# Patient Record
Sex: FEMALE | Race: OTHER | HISPANIC OR LATINO | URBAN - METROPOLITAN AREA
[De-identification: names, ages, dates, MRNs, and addresses within clinical notes are randomized per-mention and may not be internally consistent; named-entity substitution may affect disease eponyms.]

---

## 2020-08-14 ENCOUNTER — INPATIENT (INPATIENT)
Facility: HOSPITAL | Age: 25
LOS: 16 days | Discharge: NURSING, REHAB OR LONG-TERM CARE | DRG: 519 | End: 2020-08-31
Attending: NEUROLOGICAL SURGERY | Admitting: NEUROLOGICAL SURGERY
Payer: COMMERCIAL

## 2020-08-14 VITALS
HEIGHT: 65 IN | DIASTOLIC BLOOD PRESSURE: 61 MMHG | RESPIRATION RATE: 18 BRPM | SYSTOLIC BLOOD PRESSURE: 106 MMHG | TEMPERATURE: 99 F | HEART RATE: 79 BPM | WEIGHT: 190.04 LBS | OXYGEN SATURATION: 100 %

## 2020-08-14 PROCEDURE — 99222 1ST HOSP IP/OBS MODERATE 55: CPT

## 2020-08-14 PROCEDURE — 93010 ELECTROCARDIOGRAM REPORT: CPT

## 2020-08-14 PROCEDURE — 99285 EMERGENCY DEPT VISIT HI MDM: CPT

## 2020-08-14 RX ORDER — CYCLOBENZAPRINE HYDROCHLORIDE 10 MG/1
5 TABLET, FILM COATED ORAL THREE TIMES A DAY
Refills: 0 | Status: DISCONTINUED | OUTPATIENT
Start: 2020-08-14 | End: 2020-08-19

## 2020-08-14 RX ORDER — OXYCODONE AND ACETAMINOPHEN 5; 325 MG/1; MG/1
2 TABLET ORAL EVERY 6 HOURS
Refills: 0 | Status: DISCONTINUED | OUTPATIENT
Start: 2020-08-14 | End: 2020-08-19

## 2020-08-14 RX ORDER — HYDROMORPHONE HYDROCHLORIDE 2 MG/ML
0.5 INJECTION INTRAMUSCULAR; INTRAVENOUS; SUBCUTANEOUS EVERY 4 HOURS
Refills: 0 | Status: DISCONTINUED | OUTPATIENT
Start: 2020-08-14 | End: 2020-08-19

## 2020-08-14 RX ORDER — GABAPENTIN 400 MG/1
300 CAPSULE ORAL EVERY 8 HOURS
Refills: 0 | Status: DISCONTINUED | OUTPATIENT
Start: 2020-08-15 | End: 2020-08-19

## 2020-08-14 RX ORDER — OXYCODONE AND ACETAMINOPHEN 5; 325 MG/1; MG/1
1 TABLET ORAL ONCE
Refills: 0 | Status: DISCONTINUED | OUTPATIENT
Start: 2020-08-14 | End: 2020-08-14

## 2020-08-14 RX ORDER — OXYCODONE AND ACETAMINOPHEN 5; 325 MG/1; MG/1
1 TABLET ORAL EVERY 4 HOURS
Refills: 0 | Status: DISCONTINUED | OUTPATIENT
Start: 2020-08-14 | End: 2020-08-19

## 2020-08-14 RX ORDER — PANTOPRAZOLE SODIUM 20 MG/1
40 TABLET, DELAYED RELEASE ORAL
Refills: 0 | Status: DISCONTINUED | OUTPATIENT
Start: 2020-08-15 | End: 2020-08-19

## 2020-08-14 RX ORDER — GABAPENTIN 400 MG/1
200 CAPSULE ORAL ONCE
Refills: 0 | Status: COMPLETED | OUTPATIENT
Start: 2020-08-14 | End: 2020-08-14

## 2020-08-14 RX ORDER — SENNA PLUS 8.6 MG/1
2 TABLET ORAL AT BEDTIME
Refills: 0 | Status: DISCONTINUED | OUTPATIENT
Start: 2020-08-14 | End: 2020-08-19

## 2020-08-14 RX ORDER — DEXAMETHASONE 0.5 MG/5ML
4 ELIXIR ORAL EVERY 6 HOURS
Refills: 0 | Status: DISCONTINUED | OUTPATIENT
Start: 2020-08-14 | End: 2020-08-19

## 2020-08-14 RX ADMIN — OXYCODONE AND ACETAMINOPHEN 1 TABLET(S): 5; 325 TABLET ORAL at 23:48

## 2020-08-14 RX ADMIN — GABAPENTIN 200 MILLIGRAM(S): 400 CAPSULE ORAL at 23:48

## 2020-08-14 NOTE — ED ADULT NURSE NOTE - NSIMPLEMENTINTERV_GEN_ALL_ED
Implemented All Fall Risk Interventions:  [place] to call system. Call bell, personal items and telephone within reach. Instruct patient to call for assistance. Room bathroom lighting operational. Non-slip footwear when patient is off stretcher. Physically safe environment: no spills, clutter or unnecessary equipment. Stretcher in lowest position, wheels locked, appropriate side rails in place. Provide visual cue, wrist band, yellow gown, etc. Monitor gait and stability. Monitor for mental status changes and reorient to person, place, and time. Review medications for side effects contributing to fall risk. Reinforce activity limits and safety measures with patient and family.

## 2020-08-14 NOTE — H&P ADULT - ASSESSMENT
24 y/o female with worsening LBP, a/w RLE weakness, numbness admit for w/u and potential pre-op  pain control  regional bed  can continue steroids  cont gabapentin  flexeril prn spasm  percocet prn pain  bowel regimen  MRI c/s contrast  lumbar plain films  SCDs/lovenox ppx  as above d/w Dr. Denise

## 2020-08-14 NOTE — ED PROVIDER NOTE - CLINICAL SUMMARY MEDICAL DECISION MAKING FREE TEXT BOX
26 y/o F w/ PMHx asthma, PCOS, lumbar spinal disc herniation 2017 presents to the ER w/ pain to lower back, paresthesia and weakness to RLE. Pending neurosurgery evaluation.

## 2020-08-14 NOTE — H&P ADULT - NSHPPHYSICALEXAM_GEN_ALL_CORE
A&O x 3, comfotable  EOMI, PERRL  cta b/l  RRR  abd soft NTND  Rt HF/HF/PF 2/5, Rt EHL/GA/TS 1/5, LLE 5/5 throughout  dec sensation to light touch in L5S1 dermatomes

## 2020-08-14 NOTE — ED PROVIDER NOTE - ATTENDING CONTRIBUTION TO CARE
26yo obese PCOS, asthma, herniated lumbar discs w lower back pain, tingling numbness in lower ext, dec sensation in R and R foot drop. admitted for three weeks at OSH w pain meds.  Well appearing, nad, nc/at, lung cta, heart reg, abd soft, nt, ext no gross deformity, pending nsgy eval, reassess. 24yo  PCOS, asthma, herniated lumbar discs w lower back pain, tingling numbness in lower ext, dec sensation in R and R foot drop. admitted for three weeks at OSH w pain meds.  Well appearing, nad, nc/at, lung cta, heart reg, abd soft, nt, ext no gross deformity, pending nsgy eval, reassess.

## 2020-08-14 NOTE — ED ADULT NURSE NOTE - OBJECTIVE STATEMENT
BIBS by private car c/o lower back pain. Patient was at OSH for 3 weeks and was discharged today.  Lat pain med taken this Motrin and was prescribed of Percocet but never started yet. Alert and oriented x 3 , comfortable in room air. Denies numbness with palpable distant pulses. Patient unable to ambulate by her self. Denies recent trauma, fevers and  no recent travels.

## 2020-08-14 NOTE — ED ADULT NURSE NOTE - NS PRO AD BILL OF RIGHTS
Seen for CSE this a.m. Noted for +signs of aspiration with thin, improved tolerance of NTL. Given recent events during hospitalization, instrumental assessment was warranted.
No

## 2020-08-14 NOTE — ED ADULT TRIAGE NOTE - OTHER COMPLAINTS
CC of atraumatic back pain. Hx of herniated disc L5, no sensation on the R leg (Admitted to [facility] x 3 weeks, d/c'ed today). unable to walk by herself.

## 2020-08-14 NOTE — ED PROVIDER NOTE - OBJECTIVE STATEMENT
24 y/o F w/ PMHx asthma, PCOS, lumbar spinal disc herniation 2017 presents to the ER c/o R foot drop, weakness and decreased sensation to RLE more than LLE, and significant pain to lower back. Pt was admitted for x3 wks at [facility] for same and d/c earlier today. During admission had MRI and CT scan done of spine and brain. Was also seen by ortho and had physical therapy session. Plan was for her to continue pain medications and physical therapy as an outpatient. Pt is very concerned because her symptoms are not improving and came tonight seeking neurosurgery evaluation as recommended by Dr. Del Toro's team.

## 2020-08-14 NOTE — ED ADULT NURSE NOTE - OTHER COMPLAINTS
CC of atraumatic back pain. Hx of herniated disc L5, no sensation on the R leg (Admitted to [facility] x 3 weeks, d/c'ed today). unable to walk by herself.

## 2020-08-14 NOTE — H&P ADULT - NSHPREVIEWOFSYSTEMS_GEN_ALL_CORE
26 y/o female pmhx PCOS, asthma, and lumbar HNP 2017 treated w/ conservative managemetn presents to [facility] ED today after being discharged early from OSH with continued LBP radiating to the RLE and now to the LLE. Patient was admitted and worked up for cauda equina at [facility] and treated for L5S1 HNP with LICHA, steroids, gabapentin and percocet. She was discharged home today but is still unable to walk without assistance. She was scheduled to start outpatient PT but the pain and weakness is worsening. She also reports numbness to the RLE down to the foot now with tingling sensation in the LLE. Denies any recent trauma, falls, denies any recent illness, fever or chills. Denies saddle anesthesia, denies b/b dysfunction.

## 2020-08-14 NOTE — ED ADULT NURSE NOTE - BREATH SOUNDS, LEFT
complains of pain/discomfort/abd pain
clear
PAST SURGICAL HISTORY:  H/O cardiac radiofrequency ablation 7/ 2018    History of umbilical hernia repair 7/2/ 2018    S/P CABG x 3 1/ 2007

## 2020-08-15 LAB
ALBUMIN SERPL ELPH-MCNC: 4 G/DL — SIGNIFICANT CHANGE UP (ref 3.3–5)
ALP SERPL-CCNC: 76 U/L — SIGNIFICANT CHANGE UP (ref 40–120)
ALT FLD-CCNC: 57 U/L — HIGH (ref 10–45)
ANION GAP SERPL CALC-SCNC: 12 MMOL/L — SIGNIFICANT CHANGE UP (ref 5–17)
AST SERPL-CCNC: 25 U/L — SIGNIFICANT CHANGE UP (ref 10–40)
BASOPHILS # BLD AUTO: 0.03 K/UL — SIGNIFICANT CHANGE UP (ref 0–0.2)
BASOPHILS NFR BLD AUTO: 0.3 % — SIGNIFICANT CHANGE UP (ref 0–2)
BILIRUB SERPL-MCNC: 0.2 MG/DL — SIGNIFICANT CHANGE UP (ref 0.2–1.2)
BUN SERPL-MCNC: 16 MG/DL — SIGNIFICANT CHANGE UP (ref 7–23)
CALCIUM SERPL-MCNC: 9.2 MG/DL — SIGNIFICANT CHANGE UP (ref 8.4–10.5)
CHLORIDE SERPL-SCNC: 100 MMOL/L — SIGNIFICANT CHANGE UP (ref 96–108)
CO2 SERPL-SCNC: 26 MMOL/L — SIGNIFICANT CHANGE UP (ref 22–31)
CREAT SERPL-MCNC: 0.73 MG/DL — SIGNIFICANT CHANGE UP (ref 0.5–1.3)
EOSINOPHIL # BLD AUTO: 0.22 K/UL — SIGNIFICANT CHANGE UP (ref 0–0.5)
EOSINOPHIL NFR BLD AUTO: 2.4 % — SIGNIFICANT CHANGE UP (ref 0–6)
GLUCOSE SERPL-MCNC: 99 MG/DL — SIGNIFICANT CHANGE UP (ref 70–99)
HCG UR QL: NEGATIVE — SIGNIFICANT CHANGE UP
HCT VFR BLD CALC: 37.2 % — SIGNIFICANT CHANGE UP (ref 34.5–45)
HGB BLD-MCNC: 12.1 G/DL — SIGNIFICANT CHANGE UP (ref 11.5–15.5)
IMM GRANULOCYTES NFR BLD AUTO: 0.4 % — SIGNIFICANT CHANGE UP (ref 0–1.5)
LYMPHOCYTES # BLD AUTO: 3.15 K/UL — SIGNIFICANT CHANGE UP (ref 1–3.3)
LYMPHOCYTES # BLD AUTO: 34.7 % — SIGNIFICANT CHANGE UP (ref 13–44)
MCHC RBC-ENTMCNC: 28.2 PG — SIGNIFICANT CHANGE UP (ref 27–34)
MCHC RBC-ENTMCNC: 32.5 GM/DL — SIGNIFICANT CHANGE UP (ref 32–36)
MCV RBC AUTO: 86.7 FL — SIGNIFICANT CHANGE UP (ref 80–100)
MONOCYTES # BLD AUTO: 0.88 K/UL — SIGNIFICANT CHANGE UP (ref 0–0.9)
MONOCYTES NFR BLD AUTO: 9.7 % — SIGNIFICANT CHANGE UP (ref 2–14)
NEUTROPHILS # BLD AUTO: 4.75 K/UL — SIGNIFICANT CHANGE UP (ref 1.8–7.4)
NEUTROPHILS NFR BLD AUTO: 52.5 % — SIGNIFICANT CHANGE UP (ref 43–77)
NRBC # BLD: 0 /100 WBCS — SIGNIFICANT CHANGE UP (ref 0–0)
PLATELET # BLD AUTO: 314 K/UL — SIGNIFICANT CHANGE UP (ref 150–400)
POTASSIUM SERPL-MCNC: 4.1 MMOL/L — SIGNIFICANT CHANGE UP (ref 3.5–5.3)
POTASSIUM SERPL-SCNC: 4.1 MMOL/L — SIGNIFICANT CHANGE UP (ref 3.5–5.3)
PROT SERPL-MCNC: 6.8 G/DL — SIGNIFICANT CHANGE UP (ref 6–8.3)
RBC # BLD: 4.29 M/UL — SIGNIFICANT CHANGE UP (ref 3.8–5.2)
RBC # FLD: 13.4 % — SIGNIFICANT CHANGE UP (ref 10.3–14.5)
SARS-COV-2 IGG SERPL QL IA: POSITIVE
SARS-COV-2 IGM SERPL IA-ACNC: 3.2 RATIO — HIGH
SARS-COV-2 RNA SPEC QL NAA+PROBE: SIGNIFICANT CHANGE UP
SODIUM SERPL-SCNC: 138 MMOL/L — SIGNIFICANT CHANGE UP (ref 135–145)
WBC # BLD: 9.07 K/UL — SIGNIFICANT CHANGE UP (ref 3.8–10.5)
WBC # FLD AUTO: 9.07 K/UL — SIGNIFICANT CHANGE UP (ref 3.8–10.5)

## 2020-08-15 PROCEDURE — 72158 MRI LUMBAR SPINE W/O & W/DYE: CPT | Mod: 26

## 2020-08-15 PROCEDURE — 72100 X-RAY EXAM L-S SPINE 2/3 VWS: CPT | Mod: 26

## 2020-08-15 PROCEDURE — 72157 MRI CHEST SPINE W/O & W/DYE: CPT | Mod: 26

## 2020-08-15 PROCEDURE — 72131 CT LUMBAR SPINE W/O DYE: CPT | Mod: 26

## 2020-08-15 RX ADMIN — OXYCODONE AND ACETAMINOPHEN 2 TABLET(S): 5; 325 TABLET ORAL at 13:39

## 2020-08-15 RX ADMIN — GABAPENTIN 300 MILLIGRAM(S): 400 CAPSULE ORAL at 13:05

## 2020-08-15 RX ADMIN — CYCLOBENZAPRINE HYDROCHLORIDE 5 MILLIGRAM(S): 10 TABLET, FILM COATED ORAL at 17:39

## 2020-08-15 RX ADMIN — GABAPENTIN 300 MILLIGRAM(S): 400 CAPSULE ORAL at 05:35

## 2020-08-15 RX ADMIN — Medication 4 MILLIGRAM(S): at 22:58

## 2020-08-15 RX ADMIN — OXYCODONE AND ACETAMINOPHEN 2 TABLET(S): 5; 325 TABLET ORAL at 04:29

## 2020-08-15 RX ADMIN — OXYCODONE AND ACETAMINOPHEN 2 TABLET(S): 5; 325 TABLET ORAL at 23:58

## 2020-08-15 RX ADMIN — OXYCODONE AND ACETAMINOPHEN 2 TABLET(S): 5; 325 TABLET ORAL at 03:29

## 2020-08-15 RX ADMIN — GABAPENTIN 300 MILLIGRAM(S): 400 CAPSULE ORAL at 21:10

## 2020-08-15 RX ADMIN — CYCLOBENZAPRINE HYDROCHLORIDE 5 MILLIGRAM(S): 10 TABLET, FILM COATED ORAL at 07:59

## 2020-08-15 RX ADMIN — Medication 4 MILLIGRAM(S): at 12:05

## 2020-08-15 RX ADMIN — OXYCODONE AND ACETAMINOPHEN 2 TABLET(S): 5; 325 TABLET ORAL at 22:58

## 2020-08-15 RX ADMIN — Medication 4 MILLIGRAM(S): at 05:35

## 2020-08-15 RX ADMIN — OXYCODONE AND ACETAMINOPHEN 2 TABLET(S): 5; 325 TABLET ORAL at 14:30

## 2020-08-15 RX ADMIN — PANTOPRAZOLE SODIUM 40 MILLIGRAM(S): 20 TABLET, DELAYED RELEASE ORAL at 05:35

## 2020-08-15 RX ADMIN — Medication 4 MILLIGRAM(S): at 17:39

## 2020-08-15 RX ADMIN — Medication 4 MILLIGRAM(S): at 00:09

## 2020-08-15 RX ADMIN — OXYCODONE AND ACETAMINOPHEN 1 TABLET(S): 5; 325 TABLET ORAL at 00:25

## 2020-08-15 NOTE — PATIENT PROFILE ADULT - BRADEN FRICTION AND SHEAR
1/9/20, 1:40 PM  Patient is discharged today to [facility] by ambulette. SW spoke to DON to inform of this and felt he could transport by ambulette. SW spoke to his guardian, Tana Dc. SW informed patient of the plan and that his guardian was informed of this as well as Ashely Mock the [facility]  Patient goals/plan/ treatment preferences discussed by  and . Patient goals/plan/ treatment preferences reviewed with patient/ family. Patient/ family verbalize understanding of discharge plan and are in agreement with goal/plan/treatment preferences. Understanding was demonstrated using the teach back method. AVS provided by RN at time of discharge, which includes all necessary medical information pertaining to the patients current course of illness, treatment, post-discharge goals of care, and treatment preferences. Services After Discharge  Services At/After Discharge:  [facility] (3) no apparent problem

## 2020-08-15 NOTE — CHART NOTE - NSCHARTNOTEFT_GEN_A_CORE
Neurosurgical Indications for Screening Dopplers on Admission:       1) Known hypercoagulation disorder (h/o VTE, thrombophilia, HIT, etc.)   No  2) Admitted from prolonged stay from another institution (straight forward ER transfers not included)  No  3) Presenting with significant leg immobility   No  4) Presenting with signs and symptoms of VTE?    No  5) With significant critical illness, Including "found down" for unknown period of time in HPI  No  6) With significant neurotrauma (TBI, SCI / TLS spine fractures)   No  7) Who are comatose   No  8) With known malignancy (e.g. glioblastoma multiforme, meningioma, etc.). Excludes IA chemo 23hr observation stays  No  9) On hemodialysis   No  10) Who have received platelet transfusion or antithrombotic reversal agents recently   No  11) Who have had recent major orthopedic surgery    No        Screening dopplers indicated?   Y _   N _X    DVT Prophylaxis:  X_ SCD's   _ chemoprophylaxis

## 2020-08-16 LAB
ANION GAP SERPL CALC-SCNC: 12 MMOL/L — SIGNIFICANT CHANGE UP (ref 5–17)
BUN SERPL-MCNC: 16 MG/DL — SIGNIFICANT CHANGE UP (ref 7–23)
CALCIUM SERPL-MCNC: 9.7 MG/DL — SIGNIFICANT CHANGE UP (ref 8.4–10.5)
CHLORIDE SERPL-SCNC: 101 MMOL/L — SIGNIFICANT CHANGE UP (ref 96–108)
CO2 SERPL-SCNC: 24 MMOL/L — SIGNIFICANT CHANGE UP (ref 22–31)
CREAT SERPL-MCNC: 0.66 MG/DL — SIGNIFICANT CHANGE UP (ref 0.5–1.3)
GLUCOSE SERPL-MCNC: 129 MG/DL — HIGH (ref 70–99)
HCT VFR BLD CALC: 39 % — SIGNIFICANT CHANGE UP (ref 34.5–45)
HGB BLD-MCNC: 12.9 G/DL — SIGNIFICANT CHANGE UP (ref 11.5–15.5)
MCHC RBC-ENTMCNC: 28.2 PG — SIGNIFICANT CHANGE UP (ref 27–34)
MCHC RBC-ENTMCNC: 33.1 GM/DL — SIGNIFICANT CHANGE UP (ref 32–36)
MCV RBC AUTO: 85.2 FL — SIGNIFICANT CHANGE UP (ref 80–100)
NRBC # BLD: 0 /100 WBCS — SIGNIFICANT CHANGE UP (ref 0–0)
PLATELET # BLD AUTO: 353 K/UL — SIGNIFICANT CHANGE UP (ref 150–400)
POTASSIUM SERPL-MCNC: 4.2 MMOL/L — SIGNIFICANT CHANGE UP (ref 3.5–5.3)
POTASSIUM SERPL-SCNC: 4.2 MMOL/L — SIGNIFICANT CHANGE UP (ref 3.5–5.3)
RBC # BLD: 4.58 M/UL — SIGNIFICANT CHANGE UP (ref 3.8–5.2)
RBC # FLD: 13.2 % — SIGNIFICANT CHANGE UP (ref 10.3–14.5)
SODIUM SERPL-SCNC: 137 MMOL/L — SIGNIFICANT CHANGE UP (ref 135–145)
WBC # BLD: 17.6 K/UL — HIGH (ref 3.8–10.5)
WBC # FLD AUTO: 17.6 K/UL — HIGH (ref 3.8–10.5)

## 2020-08-16 PROCEDURE — 99232 SBSQ HOSP IP/OBS MODERATE 35: CPT

## 2020-08-16 RX ADMIN — Medication 5 MILLIGRAM(S): at 21:40

## 2020-08-16 RX ADMIN — Medication 4 MILLIGRAM(S): at 23:02

## 2020-08-16 RX ADMIN — PANTOPRAZOLE SODIUM 40 MILLIGRAM(S): 20 TABLET, DELAYED RELEASE ORAL at 05:33

## 2020-08-16 RX ADMIN — CYCLOBENZAPRINE HYDROCHLORIDE 5 MILLIGRAM(S): 10 TABLET, FILM COATED ORAL at 07:48

## 2020-08-16 RX ADMIN — OXYCODONE AND ACETAMINOPHEN 2 TABLET(S): 5; 325 TABLET ORAL at 18:03

## 2020-08-16 RX ADMIN — Medication 4 MILLIGRAM(S): at 18:03

## 2020-08-16 RX ADMIN — GABAPENTIN 300 MILLIGRAM(S): 400 CAPSULE ORAL at 21:40

## 2020-08-16 RX ADMIN — OXYCODONE AND ACETAMINOPHEN 2 TABLET(S): 5; 325 TABLET ORAL at 09:55

## 2020-08-16 RX ADMIN — OXYCODONE AND ACETAMINOPHEN 2 TABLET(S): 5; 325 TABLET ORAL at 18:33

## 2020-08-16 RX ADMIN — OXYCODONE AND ACETAMINOPHEN 2 TABLET(S): 5; 325 TABLET ORAL at 10:33

## 2020-08-16 RX ADMIN — GABAPENTIN 300 MILLIGRAM(S): 400 CAPSULE ORAL at 13:01

## 2020-08-16 RX ADMIN — GABAPENTIN 300 MILLIGRAM(S): 400 CAPSULE ORAL at 05:33

## 2020-08-16 RX ADMIN — Medication 4 MILLIGRAM(S): at 05:33

## 2020-08-16 RX ADMIN — Medication 4 MILLIGRAM(S): at 12:58

## 2020-08-16 NOTE — PROGRESS NOTE ADULT - SUBJECTIVE AND OBJECTIVE BOX
HPI:  26 y/o female pmhx PCOS, asthma, and lumbar HNP 2017 treated w/ conservative management presents to [facility] ED today after being discharged early from OSH with continued LBP radiating to the RLE and now to the LLE. Patient was admitted and worked up for cauda equina at [facility] and treated for L5S1 HNP with LICHA, steroids, gabapentin and percocet. She was discharged home today but is still unable to walk without assistance. She was scheduled to start outpatient PT but the pain and weakness is worsening. She also reports numbness to the RLE down to the foot now with tingling sensation in the LLE. Denies any recent trauma, falls, denies any recent illness, fever or chills. Denies saddle anesthesia, denies b/b dysfunction.    Hospital Course:  8/15: Patient was admitted and worked up for cauda equina at [facility] and treated for L5-S1 HNP with LICHA, steroids, gabapentin and percocet, transferred here for further workup  8/16: LAZARO overnight. Neuro exam stable.     Vital Signs Last 24 Hrs  T(C): 36.4 (16 Aug 2020 05:01), Max: 36.9 (15 Aug 2020 17:27)  T(F): 97.6 (16 Aug 2020 05:01), Max: 98.4 (15 Aug 2020 17:27)  HR: 80 (16 Aug 2020 05:01) (78 - 103)  BP: 97/65 (16 Aug 2020 05:01) (94/59 - 116/62)  BP(mean): --  RR: 16 (16 Aug 2020 05:01) (15 - 17)  SpO2: 97% (16 Aug 2020 05:01) (96% - 97%)    I&O's Summary    15 Aug 2020 07:01  -  16 Aug 2020 06:21  --------------------------------------------------------  IN: 240 mL / OUT: 800 mL / NET: -560 mL    PHYSICAL EXAM:  Neurological: AA&O x 3, comfortable  CNII-XII: EOMI, PERRL, face symmetric  Cardiac: CTA b/l  Pulm: RRR  Abd: soft NTND  Rt HF/HF/PF 2/5, Rt EHL/GA/TS 1/5, LLE 5/5 throughout  Dec sensation to light touch in L5S1 dermatomes    TUBES/LINES:  [] Hendrix  [] Lumbar Drain  [] Wound Drains  [] Others    DIET:  [] NPO  [x] Mechanical  [] Tube feeds    LABS:                        12.1   9.07  )-----------( 314      ( 14 Aug 2020 23:57 )             37.2     08-14    138  |  100  |  16  ----------------------------<  99  4.1   |  26  |  0.73    Ca    9.2      14 Aug 2020 23:57    TPro  6.8  /  Alb  4.0  /  TBili  0.2  /  DBili  x   /  AST  25  /  ALT  57<H>  /  AlkPhos  76  08-14            CAPILLARY BLOOD GLUCOSE          Drug Levels: [] N/A    CSF Analysis: [] N/A      Allergies    No Known Allergies    Intolerances      MEDICATIONS:  Antibiotics:    Neuro:  cyclobenzaprine 5 milliGRAM(s) Oral three times a day PRN  gabapentin 300 milliGRAM(s) Oral every 8 hours  HYDROmorphone  Injectable 0.5 milliGRAM(s) IV Push every 4 hours PRN  oxycodone    5 mG/acetaminophen 325 mG 1 Tablet(s) Oral every 4 hours PRN  oxycodone    5 mG/acetaminophen 325 mG 2 Tablet(s) Oral every 6 hours PRN    Anticoagulation:    OTHER:  bisacodyl 5 milliGRAM(s) Oral daily PRN  dexAMETHasone     Tablet 4 milliGRAM(s) Oral every 6 hours  pantoprazole    Tablet 40 milliGRAM(s) Oral before breakfast  senna 2 Tablet(s) Oral at bedtime PRN    IVF:    CULTURES:    RADIOLOGY & ADDITIONAL TESTS:      ASSESSMENT:  26 y/o female with worsening LBP, a/w RLE weakness, numbness admit for w/u and potential    LUMBAR RADICULOPATHY  No pertinent family history in first degree relatives  Handoff  MEWS Score  Lumbar radiculopathy  PCOS (polycystic ovarian syndrome)  Asthma  Lumbar radiculopathy  PCOS (polycystic ovarian syndrome)  Asthma  Lumbar radiculopathy  No significant past surgical history  BACK PAIN  Lower extremity weakness  SysAdmin_VisitLink      PLAN:  -pre-op  -pain control  -regional bed  -can continue steroids  -cont gabapentin  -flexeril prn spasm  -percocet prn pain  -bowel regimen  -MRI c/s contrast  -lumbar plain films  -SCDs/lovenox ppx  -D/w Dr. Denise

## 2020-08-17 DIAGNOSIS — M54.16 RADICULOPATHY, LUMBAR REGION: ICD-10-CM

## 2020-08-17 DIAGNOSIS — J45.20 MILD INTERMITTENT ASTHMA, UNCOMPLICATED: ICD-10-CM

## 2020-08-17 DIAGNOSIS — Z01.818 ENCOUNTER FOR OTHER PREPROCEDURAL EXAMINATION: ICD-10-CM

## 2020-08-17 LAB
ANION GAP SERPL CALC-SCNC: 12 MMOL/L — SIGNIFICANT CHANGE UP (ref 5–17)
BUN SERPL-MCNC: 16 MG/DL — SIGNIFICANT CHANGE UP (ref 7–23)
CALCIUM SERPL-MCNC: 9.4 MG/DL — SIGNIFICANT CHANGE UP (ref 8.4–10.5)
CHLORIDE SERPL-SCNC: 101 MMOL/L — SIGNIFICANT CHANGE UP (ref 96–108)
CO2 SERPL-SCNC: 25 MMOL/L — SIGNIFICANT CHANGE UP (ref 22–31)
CREAT SERPL-MCNC: 0.63 MG/DL — SIGNIFICANT CHANGE UP (ref 0.5–1.3)
GLUCOSE SERPL-MCNC: 168 MG/DL — HIGH (ref 70–99)
HCT VFR BLD CALC: 40.3 % — SIGNIFICANT CHANGE UP (ref 34.5–45)
HGB BLD-MCNC: 13.2 G/DL — SIGNIFICANT CHANGE UP (ref 11.5–15.5)
MCHC RBC-ENTMCNC: 28.3 PG — SIGNIFICANT CHANGE UP (ref 27–34)
MCHC RBC-ENTMCNC: 32.8 GM/DL — SIGNIFICANT CHANGE UP (ref 32–36)
MCV RBC AUTO: 86.3 FL — SIGNIFICANT CHANGE UP (ref 80–100)
NRBC # BLD: 0 /100 WBCS — SIGNIFICANT CHANGE UP (ref 0–0)
PLATELET # BLD AUTO: 333 K/UL — SIGNIFICANT CHANGE UP (ref 150–400)
POTASSIUM SERPL-MCNC: 4.1 MMOL/L — SIGNIFICANT CHANGE UP (ref 3.5–5.3)
POTASSIUM SERPL-SCNC: 4.1 MMOL/L — SIGNIFICANT CHANGE UP (ref 3.5–5.3)
RBC # BLD: 4.67 M/UL — SIGNIFICANT CHANGE UP (ref 3.8–5.2)
RBC # FLD: 13.6 % — SIGNIFICANT CHANGE UP (ref 10.3–14.5)
SODIUM SERPL-SCNC: 138 MMOL/L — SIGNIFICANT CHANGE UP (ref 135–145)
WBC # BLD: 16.94 K/UL — HIGH (ref 3.8–10.5)
WBC # FLD AUTO: 16.94 K/UL — HIGH (ref 3.8–10.5)

## 2020-08-17 PROCEDURE — 99223 1ST HOSP IP/OBS HIGH 75: CPT | Mod: GC

## 2020-08-17 PROCEDURE — 99232 SBSQ HOSP IP/OBS MODERATE 35: CPT

## 2020-08-17 RX ADMIN — OXYCODONE AND ACETAMINOPHEN 2 TABLET(S): 5; 325 TABLET ORAL at 12:19

## 2020-08-17 RX ADMIN — PANTOPRAZOLE SODIUM 40 MILLIGRAM(S): 20 TABLET, DELAYED RELEASE ORAL at 06:18

## 2020-08-17 RX ADMIN — HYDROMORPHONE HYDROCHLORIDE 0.5 MILLIGRAM(S): 2 INJECTION INTRAMUSCULAR; INTRAVENOUS; SUBCUTANEOUS at 23:51

## 2020-08-17 RX ADMIN — OXYCODONE AND ACETAMINOPHEN 2 TABLET(S): 5; 325 TABLET ORAL at 22:01

## 2020-08-17 RX ADMIN — GABAPENTIN 300 MILLIGRAM(S): 400 CAPSULE ORAL at 06:18

## 2020-08-17 RX ADMIN — GABAPENTIN 300 MILLIGRAM(S): 400 CAPSULE ORAL at 22:21

## 2020-08-17 RX ADMIN — HYDROMORPHONE HYDROCHLORIDE 0.5 MILLIGRAM(S): 2 INJECTION INTRAMUSCULAR; INTRAVENOUS; SUBCUTANEOUS at 23:36

## 2020-08-17 RX ADMIN — OXYCODONE AND ACETAMINOPHEN 2 TABLET(S): 5; 325 TABLET ORAL at 21:01

## 2020-08-17 RX ADMIN — Medication 4 MILLIGRAM(S): at 11:19

## 2020-08-17 RX ADMIN — Medication 4 MILLIGRAM(S): at 17:21

## 2020-08-17 RX ADMIN — Medication 5 MILLIGRAM(S): at 17:21

## 2020-08-17 RX ADMIN — Medication 4 MILLIGRAM(S): at 23:37

## 2020-08-17 RX ADMIN — CYCLOBENZAPRINE HYDROCHLORIDE 5 MILLIGRAM(S): 10 TABLET, FILM COATED ORAL at 15:28

## 2020-08-17 RX ADMIN — GABAPENTIN 300 MILLIGRAM(S): 400 CAPSULE ORAL at 14:01

## 2020-08-17 RX ADMIN — CYCLOBENZAPRINE HYDROCHLORIDE 5 MILLIGRAM(S): 10 TABLET, FILM COATED ORAL at 08:46

## 2020-08-17 RX ADMIN — OXYCODONE AND ACETAMINOPHEN 2 TABLET(S): 5; 325 TABLET ORAL at 12:45

## 2020-08-17 RX ADMIN — SENNA PLUS 2 TABLET(S): 8.6 TABLET ORAL at 23:37

## 2020-08-17 RX ADMIN — Medication 4 MILLIGRAM(S): at 06:18

## 2020-08-17 RX ADMIN — OXYCODONE AND ACETAMINOPHEN 2 TABLET(S): 5; 325 TABLET ORAL at 07:18

## 2020-08-17 RX ADMIN — OXYCODONE AND ACETAMINOPHEN 2 TABLET(S): 5; 325 TABLET ORAL at 06:18

## 2020-08-17 NOTE — PROGRESS NOTE ADULT - SUBJECTIVE AND OBJECTIVE BOX
HPI:  26 y/o female pmhx PCOS, asthma, and lumbar HNP 2017 treated w/ conservative management presents to [facility] ED today after being discharged early from OSH with continued LBP radiating to the RLE and now to the LLE. Patient was admitted and worked up for cauda equina at [facility] and treated for L5S1 HNP with LICHA, steroids, gabapentin and percocet. She was discharged home today but is still unable to walk without assistance. She was scheduled to start outpatient PT but the pain and weakness is worsening. She also reports numbness to the RLE down to the foot now with tingling sensation in the LLE. Denies any recent trauma, falls, denies any recent illness, fever or chills. Denies saddle anesthesia, denies b/b dysfunction.    Hospital Course:  8/15: Patient was admitted and worked up for cauda equina at [facility] and treated for L5-S1 HNP with LICHA, steroids, gabapentin and percocet, transferred here for further workup  8/16: LAZARO overnight. Neuro exam stable.   8/17 LAZARO overnight, Neuro exam stable    ICU Vital Signs Last 24 Hrs  T(C): 37.1 (16 Aug 2020 20:12), Max: 37.1 (16 Aug 2020 20:12)  T(F): 98.7 (16 Aug 2020 20:12), Max: 98.7 (16 Aug 2020 20:12)  HR: 74 (16 Aug 2020 20:12) (74 - 98)  BP: 107/71 (16 Aug 2020 20:12) (101/66 - 108/63)  BP(mean): --  ABP: --  ABP(mean): --  RR: 16 (16 Aug 2020 20:12) (15 - 16)  SpO2: 98% (16 Aug 2020 20:12) (95% - 98%)    PHYSICAL EXAM:  Neurological: AA&O x 3, comfortable  CNII-XII: EOMI, PERRL, face symmetric  Cardiac: CTA b/l  Pulm: RRR  Abd: soft NTND  Rt HF/HF/PF 2/5, Rt EHL/GA/TS 1/5, LLE 5/5 throughout  Dec sensation to light touch in L5S1 dermatomes    TUBES/LINES:  [] Hendrix  [] Lumbar Drain  [] Wound Drains  [] Others    DIET:  [] NPO  [x] Mechanical  [] Tube feeds    LABS:                    Pending AM Collection            CAPILLARY BLOOD GLUCOSE          Drug Levels: [] N/A    CSF Analysis: [] N/A      Allergies    No Known Allergies    Intolerances      MEDICATIONS:  Antibiotics:    Neuro:  cyclobenzaprine 5 milliGRAM(s) Oral three times a day PRN  gabapentin 300 milliGRAM(s) Oral every 8 hours  HYDROmorphone  Injectable 0.5 milliGRAM(s) IV Push every 4 hours PRN  oxycodone    5 mG/acetaminophen 325 mG 1 Tablet(s) Oral every 4 hours PRN  oxycodone    5 mG/acetaminophen 325 mG 2 Tablet(s) Oral every 6 hours PRN    Anticoagulation:    OTHER:  bisacodyl 5 milliGRAM(s) Oral daily PRN  dexAMETHasone     Tablet 4 milliGRAM(s) Oral every 6 hours  pantoprazole    Tablet 40 milliGRAM(s) Oral before breakfast  senna 2 Tablet(s) Oral at bedtime PRN    IVF:    CULTURES:    RADIOLOGY & ADDITIONAL TESTS:      ASSESSMENT:  26 y/o female with worsening LBP, a/w RLE weakness, numbness admit for w/u and potential    LUMBAR RADICULOPATHY  No pertinent family history in first degree relatives  Handoff  MEWS Score  Lumbar radiculopathy  PCOS (polycystic ovarian syndrome)  Asthma  Lumbar radiculopathy  PCOS (polycystic ovarian syndrome)  Asthma  Lumbar radiculopathy  No significant past surgical history  BACK PAIN  Lower extremity weakness  SysAdmin_VisitLink      PLAN:  -pre-op  -pain control  -regional bed  -can continue steroids  -cont gabapentin  -flexeril prn spasm  -percocet prn pain  -bowel regimen  -MRI Lumbar: L5-S1 central HNP  -lumbar plain films  -SCDs/lovenox ppx  -D/w Dr. Denise

## 2020-08-17 NOTE — CONSULT NOTE ADULT - SUBJECTIVE AND OBJECTIVE BOX
Patient is a 25y old  Female who presents with a chief complaint of worsening LBP pain/right leg weakness. (17 Aug 2020 05:16)      HPI:  she is complaining of back pain with weakness on leg and loss of sensation of right leg    PAST MEDICAL & SURGICAL HISTORY:  Lumbar radiculopathy: 2017  PCOS (polycystic ovarian syndrome)  Asthma  No significant past surgical history      FAMILY HISTORY:  No pertinent family history in first degree relatives      SOCIAL HISTORY:  Smoking Status: [ ] Current, [ ] Former, [ ] Never  Pack Years:    MEDICATIONS:  Pulmonary:    Antimicrobials:    Anticoagulants:    Onc:    GI/:  bisacodyl 5 milliGRAM(s) Oral daily PRN  pantoprazole    Tablet 40 milliGRAM(s) Oral before breakfast  senna 2 Tablet(s) Oral at bedtime PRN    Endocrine:  dexAMETHasone     Tablet 4 milliGRAM(s) Oral every 6 hours    Cardiac:    Other Medications:  cyclobenzaprine 5 milliGRAM(s) Oral three times a day PRN  gabapentin 300 milliGRAM(s) Oral every 8 hours  HYDROmorphone  Injectable 0.5 milliGRAM(s) IV Push every 4 hours PRN  oxycodone    5 mG/acetaminophen 325 mG 1 Tablet(s) Oral every 4 hours PRN  oxycodone    5 mG/acetaminophen 325 mG 2 Tablet(s) Oral every 6 hours PRN      Allergies    No Known Allergies    Intolerances        Review of Systems:   •	General: negative  •	Skin/Breast: negative  •	Ophthalmologic: negative  •	ENMT: negative  •	Respiratory and Thorax: negative  •	Cardiovascular: negative  •	Gastrointestinal: negative  •	Genitourinary: negative  •	Musculoskeletal: negative  •	Neurological: back pain and weakness of the right leg  •	Psychiatric: negative  •	Hematology/Lymphatics: negative  •	Endocrine: negative  •	Allergic/Immunologic: negative    Physical Exam:   • Constitutional:	Well-developed, well nourished  • Eyes:	EOMI; PERRL; no drainage or redness  • ENMT:	No oral lesions; no gross abnormalities  • Neck	No bruits; no thyromegaly or nodules  • Breasts:	not examined  • Back:	No deformity or limitation of movement  • Respiratory:	Breath Sounds equal & clear to percussion & auscultation, no accessory muscle use  • Cardiovascular:	Regular rate & rhythm, normal S1, S2; no murmurs, gallops or rubs; no S3, S4  • Gastrointestinal:	Soft, non-tender, no hepatosplenomegaly, normal bowel sounds  • Genitourinary:	not examined  • Rectal: not examined  • Extremities:	No cyanosis, clubbing or edema  • Vascular:	Equal and normal pulses (carotid, femoral, dorsalis pedis)  • Neurologica:l	not examined  • Skin:	No lesions; no rash  • Lymph Nodes:	No lymphadedenopathy  • Musculoskeletal:	No joint pain, swelling or deformity; no limitation of movement      Vital Signs Last 24 Hrs  T(C): 36.9 (17 Aug 2020 15:13), Max: 37.1 (16 Aug 2020 20:12)  T(F): 98.5 (17 Aug 2020 15:13), Max: 98.7 (16 Aug 2020 20:12)  HR: 86 (17 Aug 2020 15:13) (74 - 86)  BP: 110/66 (17 Aug 2020 15:13) (101/65 - 110/66)  BP(mean): --  RR: 15 (17 Aug 2020 15:13) (15 - 17)  SpO2: 97% (17 Aug 2020 15:13) (97% - 98%)    08-16 @ 07:01  -  08-17 @ 07:00  --------------------------------------------------------  IN: 480 mL / OUT: 1100 mL / NET: -620 mL    08-17 @ 07:01  -  08-17 @ 17:58  --------------------------------------------------------  IN: 0 mL / OUT: 1060 mL / NET: -1060 mL          LABS:      CBC Full  -  ( 17 Aug 2020 09:27 )  WBC Count : 16.94 K/uL  RBC Count : 4.67 M/uL  Hemoglobin : 13.2 g/dL  Hematocrit : 40.3 %  Platelet Count - Automated : 333 K/uL  Mean Cell Volume : 86.3 fl  Mean Cell Hemoglobin : 28.3 pg  Mean Cell Hemoglobin Concentration : 32.8 gm/dL  Auto Neutrophil # : x  Auto Lymphocyte # : x  Auto Monocyte # : x  Auto Eosinophil # : x  Auto Basophil # : x  Auto Neutrophil % : x  Auto Lymphocyte % : x  Auto Monocyte % : x  Auto Eosinophil % : x  Auto Basophil % : x    08-17    138  |  101  |  16  ----------------------------<  168<H>  4.1   |  25  |  0.63    Ca    9.4      17 Aug 2020 09:27      EKG RSR normal                  RADIOLOGY & ADDITIONAL STUDIES (The following images were personally reviewed):

## 2020-08-17 NOTE — CONSULT NOTE ADULT - PROBLEM SELECTOR RECOMMENDATION 3
The patient's medical condition is optimized for surgery.  There is no contraindication for surgery.  There is no clinical evidence neither of angina, decompensated CHF, arrhthymias, nor valvular disease.   There is no limitation of exercise capacity.  MET is 6 .  ASA class is 1.  Florse cardiac risk factor is low .  DVT prophylaxis is indicated.  Pain control.  Early mobilization.  Avoid fluid overload.

## 2020-08-18 ENCOUNTER — TRANSCRIPTION ENCOUNTER (OUTPATIENT)
Age: 25
End: 2020-08-18

## 2020-08-18 LAB
ANION GAP SERPL CALC-SCNC: 13 MMOL/L — SIGNIFICANT CHANGE UP (ref 5–17)
APTT BLD: 26.5 SEC — LOW (ref 27.5–35.5)
BLD GP AB SCN SERPL QL: NEGATIVE — SIGNIFICANT CHANGE UP
BUN SERPL-MCNC: 17 MG/DL — SIGNIFICANT CHANGE UP (ref 7–23)
CALCIUM SERPL-MCNC: 9.3 MG/DL — SIGNIFICANT CHANGE UP (ref 8.4–10.5)
CHLORIDE SERPL-SCNC: 99 MMOL/L — SIGNIFICANT CHANGE UP (ref 96–108)
CO2 SERPL-SCNC: 26 MMOL/L — SIGNIFICANT CHANGE UP (ref 22–31)
CREAT SERPL-MCNC: 0.61 MG/DL — SIGNIFICANT CHANGE UP (ref 0.5–1.3)
GLUCOSE SERPL-MCNC: 106 MG/DL — HIGH (ref 70–99)
HCG UR QL: NEGATIVE — SIGNIFICANT CHANGE UP
HCT VFR BLD CALC: 39.5 % — SIGNIFICANT CHANGE UP (ref 34.5–45)
HGB BLD-MCNC: 12.7 G/DL — SIGNIFICANT CHANGE UP (ref 11.5–15.5)
INR BLD: 1.08 — SIGNIFICANT CHANGE UP (ref 0.88–1.16)
MCHC RBC-ENTMCNC: 27.7 PG — SIGNIFICANT CHANGE UP (ref 27–34)
MCHC RBC-ENTMCNC: 32.2 GM/DL — SIGNIFICANT CHANGE UP (ref 32–36)
MCV RBC AUTO: 86.2 FL — SIGNIFICANT CHANGE UP (ref 80–100)
NRBC # BLD: 0 /100 WBCS — SIGNIFICANT CHANGE UP (ref 0–0)
PLATELET # BLD AUTO: 374 K/UL — SIGNIFICANT CHANGE UP (ref 150–400)
POTASSIUM SERPL-MCNC: 4.7 MMOL/L — SIGNIFICANT CHANGE UP (ref 3.5–5.3)
POTASSIUM SERPL-SCNC: 4.7 MMOL/L — SIGNIFICANT CHANGE UP (ref 3.5–5.3)
PROTHROM AB SERPL-ACNC: 12.9 SEC — SIGNIFICANT CHANGE UP (ref 10.6–13.6)
RBC # BLD: 4.58 M/UL — SIGNIFICANT CHANGE UP (ref 3.8–5.2)
RBC # FLD: 13.4 % — SIGNIFICANT CHANGE UP (ref 10.3–14.5)
RH IG SCN BLD-IMP: POSITIVE — SIGNIFICANT CHANGE UP
RH IG SCN BLD-IMP: POSITIVE — SIGNIFICANT CHANGE UP
SODIUM SERPL-SCNC: 138 MMOL/L — SIGNIFICANT CHANGE UP (ref 135–145)
WBC # BLD: 15.73 K/UL — HIGH (ref 3.8–10.5)
WBC # FLD AUTO: 15.73 K/UL — HIGH (ref 3.8–10.5)

## 2020-08-18 PROCEDURE — 71045 X-RAY EXAM CHEST 1 VIEW: CPT | Mod: 26

## 2020-08-18 PROCEDURE — 99232 SBSQ HOSP IP/OBS MODERATE 35: CPT

## 2020-08-18 PROCEDURE — 99232 SBSQ HOSP IP/OBS MODERATE 35: CPT | Mod: GC

## 2020-08-18 RX ORDER — POVIDONE-IODINE 5 %
1 AEROSOL (ML) TOPICAL ONCE
Refills: 0 | Status: COMPLETED | OUTPATIENT
Start: 2020-08-18 | End: 2020-08-19

## 2020-08-18 RX ORDER — LACTULOSE 10 G/15ML
20 SOLUTION ORAL
Refills: 0 | Status: DISCONTINUED | OUTPATIENT
Start: 2020-08-18 | End: 2020-08-19

## 2020-08-18 RX ORDER — SODIUM CHLORIDE 9 MG/ML
1000 INJECTION INTRAMUSCULAR; INTRAVENOUS; SUBCUTANEOUS
Refills: 0 | Status: DISCONTINUED | OUTPATIENT
Start: 2020-08-18 | End: 2020-08-19

## 2020-08-18 RX ADMIN — Medication 4 MILLIGRAM(S): at 17:26

## 2020-08-18 RX ADMIN — OXYCODONE AND ACETAMINOPHEN 2 TABLET(S): 5; 325 TABLET ORAL at 23:51

## 2020-08-18 RX ADMIN — HYDROMORPHONE HYDROCHLORIDE 0.5 MILLIGRAM(S): 2 INJECTION INTRAMUSCULAR; INTRAVENOUS; SUBCUTANEOUS at 05:37

## 2020-08-18 RX ADMIN — OXYCODONE AND ACETAMINOPHEN 2 TABLET(S): 5; 325 TABLET ORAL at 18:40

## 2020-08-18 RX ADMIN — HYDROMORPHONE HYDROCHLORIDE 0.5 MILLIGRAM(S): 2 INJECTION INTRAMUSCULAR; INTRAVENOUS; SUBCUTANEOUS at 21:48

## 2020-08-18 RX ADMIN — LACTULOSE 20 GRAM(S): 10 SOLUTION ORAL at 14:29

## 2020-08-18 RX ADMIN — HYDROMORPHONE HYDROCHLORIDE 0.5 MILLIGRAM(S): 2 INJECTION INTRAMUSCULAR; INTRAVENOUS; SUBCUTANEOUS at 05:52

## 2020-08-18 RX ADMIN — Medication 4 MILLIGRAM(S): at 23:51

## 2020-08-18 RX ADMIN — Medication 4 MILLIGRAM(S): at 07:01

## 2020-08-18 RX ADMIN — HYDROMORPHONE HYDROCHLORIDE 0.5 MILLIGRAM(S): 2 INJECTION INTRAMUSCULAR; INTRAVENOUS; SUBCUTANEOUS at 11:56

## 2020-08-18 RX ADMIN — HYDROMORPHONE HYDROCHLORIDE 0.5 MILLIGRAM(S): 2 INJECTION INTRAMUSCULAR; INTRAVENOUS; SUBCUTANEOUS at 21:34

## 2020-08-18 RX ADMIN — Medication 4 MILLIGRAM(S): at 11:28

## 2020-08-18 RX ADMIN — PANTOPRAZOLE SODIUM 40 MILLIGRAM(S): 20 TABLET, DELAYED RELEASE ORAL at 07:01

## 2020-08-18 RX ADMIN — OXYCODONE AND ACETAMINOPHEN 2 TABLET(S): 5; 325 TABLET ORAL at 17:26

## 2020-08-18 RX ADMIN — OXYCODONE AND ACETAMINOPHEN 2 TABLET(S): 5; 325 TABLET ORAL at 11:11

## 2020-08-18 RX ADMIN — GABAPENTIN 300 MILLIGRAM(S): 400 CAPSULE ORAL at 21:31

## 2020-08-18 RX ADMIN — HYDROMORPHONE HYDROCHLORIDE 0.5 MILLIGRAM(S): 2 INJECTION INTRAMUSCULAR; INTRAVENOUS; SUBCUTANEOUS at 11:32

## 2020-08-18 RX ADMIN — GABAPENTIN 300 MILLIGRAM(S): 400 CAPSULE ORAL at 14:29

## 2020-08-18 RX ADMIN — GABAPENTIN 300 MILLIGRAM(S): 400 CAPSULE ORAL at 07:01

## 2020-08-18 RX ADMIN — OXYCODONE AND ACETAMINOPHEN 2 TABLET(S): 5; 325 TABLET ORAL at 10:11

## 2020-08-18 NOTE — PROGRESS NOTE ADULT - SUBJECTIVE AND OBJECTIVE BOX
HPI:    Hospital Course:  8/15: Patient was admitted and worked up for cauda equina at [facility] and treated for L5-S1 HNP with LICHA, steroids, gabapentin and percocet, transferred here for further workup  8/16: LAZARO overnight. Neuro exam stable.   8/17 LAZARO overnight, Neuro exam stable  8/18: LAZARO overnight. Neuro exam stable. Plan for OR tomorrow     Vital Signs Last 24 Hrs  T(C): 37.1 (17 Aug 2020 20:13), Max: 37.1 (17 Aug 2020 20:13)  T(F): 98.7 (17 Aug 2020 20:13), Max: 98.7 (17 Aug 2020 20:13)  HR: 67 (17 Aug 2020 20:13) (67 - 86)  BP: 96/61 (17 Aug 2020 20:13) (96/61 - 110/66)  BP(mean): --  RR: 17 (17 Aug 2020 20:13) (15 - 17)  SpO2: 98% (17 Aug 2020 20:13) (97% - 98%)    I&O's Summary    16 Aug 2020 07:01  -  17 Aug 2020 07:00  --------------------------------------------------------  IN: 480 mL / OUT: 1100 mL / NET: -620 mL    17 Aug 2020 07:01  -  18 Aug 2020 01:44  --------------------------------------------------------  IN: 0 mL / OUT: 1060 mL / NET: -1060 mL      PHYSICAL EXAM:  Neurological: AA&O x 3, comfortable  CNII-XII: EOMI, PERRL, face symmetric  Cardiac: CTA b/l  Pulm: RRR  Abd: soft NTND  Rt HF/HE/PF 3/5, Rt EHL/GA/TS 2/5, LLE 5/5 throughout, UE 5/5 b/l  Dec sensation to light touch in L5S1 dermatomes    TUBES/LINES:  [] Simeon  [] Lumbar Drain  [] Wound Drains  [] Others    DIET:  [] NPO  [x] Mechanical  [] Tube feeds    LABS:                        13.2   16.94 )-----------( 333      ( 17 Aug 2020 09:27 )             40.3     08-17    138  |  101  |  16  ----------------------------<  168<H>  4.1   |  25  |  0.63    Ca    9.4      17 Aug 2020 09:27              CAPILLARY BLOOD GLUCOSE          Drug Levels: [] N/A    CSF Analysis: [] N/A      Allergies    No Known Allergies    Intolerances      MEDICATIONS:  Antibiotics:    Neuro:  cyclobenzaprine 5 milliGRAM(s) Oral three times a day PRN  gabapentin 300 milliGRAM(s) Oral every 8 hours  HYDROmorphone  Injectable 0.5 milliGRAM(s) IV Push every 4 hours PRN  oxycodone    5 mG/acetaminophen 325 mG 1 Tablet(s) Oral every 4 hours PRN  oxycodone    5 mG/acetaminophen 325 mG 2 Tablet(s) Oral every 6 hours PRN    Anticoagulation:    OTHER:  bisacodyl 5 milliGRAM(s) Oral daily PRN  dexAMETHasone     Tablet 4 milliGRAM(s) Oral every 6 hours  pantoprazole    Tablet 40 milliGRAM(s) Oral before breakfast  senna 2 Tablet(s) Oral at bedtime PRN    IVF:    CULTURES:    RADIOLOGY & ADDITIONAL TESTS:      ASSESSMENT:  24 y/o female with PMHX PCOS, athma, p/w worsening LBP, a/w RLE weakness, numbness, has L5-S1 HNP     LUMBAR RADICULOPATHY  No pertinent family history in first degree relatives  Handoff  MEWS Score  Lumbar radiculopathy  PCOS (polycystic ovarian syndrome)  Asthma  Lumbar radiculopathy  PCOS (polycystic ovarian syndrome)  Asthma  Lumbar radiculopathy  Preoperative clearance  Mild intermittent asthma without complication  Lumbar radiculopathy  No significant past surgical history  BACK PAIN  Lower extremity weakness  SysAdmin_VisitLink      PLAN:  -pre-op for OR tomorrow  -pain control  -can continue steroids  -cont gabapentin  -flexeril prn spasm  -percocet prn pain  -bowel regimen  -MRI Lumbar: L5-S1 central HNP  -DVTS ppx: SCDs  -D/w Dr. Denise

## 2020-08-18 NOTE — PROGRESS NOTE ADULT - SUBJECTIVE AND OBJECTIVE BOX
NP Note Neurosurgery Dr Denise   Pre op Note    HPI:    Hospital Course:  8/15: Patient was admitted and worked up for cauda equina at [facility] and treated for L5-S1 HNP with LICHA, steroids, gabapentin and percocet, transferred here for further workup  8/16: LAZARO overnight. Neuro exam stable.   8/17 LAZARO overnight, Neuro exam stable  8/18: LAZARO overnight. Neuro exam stable. Plan for OR tomorrow     Vital Signs Last 24 Hrs  T(C): 37.1 (17 Aug 2020 20:13), Max: 37.1 (17 Aug 2020 20:13)  T(F): 98.7 (17 Aug 2020 20:13), Max: 98.7 (17 Aug 2020 20:13)  HR: 67 (17 Aug 2020 20:13) (67 - 86)  BP: 96/61 (17 Aug 2020 20:13) (96/61 - 110/66)  BP(mean): --  RR: 17 (17 Aug 2020 20:13) (15 - 17)  SpO2: 98% (17 Aug 2020 20:13) (97% - 98%)    PHYSICAL EXAM:  Neurological: AA&O x 3, comfortable  CNII-XII: EOMI, PERRL, face symmetric  Cardiac: CTA b/l  Pulm: RRR  Abd: soft NTND  Rt HF/HE/PF 3/5, Rt EHL/GA/TS 2/5, LLE 5/5 throughout, UE 5/5 b/l  Dec sensation to light touch in L5S1 dermatomes    ASSESSMENT:  26 y/o female with PMHX PCOS, athma, p/w worsening LBP, a/w RLE weakness, numbness, has L5-S1 HNP   Preop for  OR in AM    Plan    NPO/IVF at 12MN  F/U Coags and Pregnancy test  DC Lovenox  Medical cleared  Consent signed  Pain Management  Bowel regime  Anesthesia pending      Dispo: Discussed with attending

## 2020-08-18 NOTE — CHART NOTE - NSCHARTNOTEFT_GEN_A_CORE
Admitting Diagnosis:   Patient is a 25y old  Female who presents with a chief complaint of worsening LBP pain/right leg weakness. (18 Aug 2020 07:44)    Consult: Yes [   ]  No [ X  ]    Reason for Initial Nutrition Assessment: LOS    PAST MEDICAL & SURGICAL HISTORY:  Lumbar radiculopathy: 2017  PCOS (polycystic ovarian syndrome)  Asthma  No significant past surgical history    Current Nutrition Order: regular diet    PO Intake: Good (%) [ X  ]  Fair (50-75%) [   ] Poor (<25%) [   ]    GI Issues: pt denies N/V, BM today    Pain: pt endorses pain (recently received pain medication)    Skin Integrity: Aidan score 20    Labs:   08-18    138  |  99  |  17  ----------------------------<  106<H>  4.7   |  26  |  0.61    Ca    9.3      18 Aug 2020 06:23    Nutritionally Pertinent Lab Values:  8/18: Glu 106    Medications:  MEDICATIONS  (STANDING):  dexAMETHasone     Tablet 4 milliGRAM(s) Oral every 6 hours  gabapentin 300 milliGRAM(s) Oral every 8 hours  pantoprazole    Tablet 40 milliGRAM(s) Oral before breakfast  povidone iodine 5% Nasal Swab 1 Application(s) Both Nostrils once  sodium chloride 0.9%. 1000 milliLiter(s) (75 mL/Hr) IV Continuous <Continuous>    MEDICATIONS  (PRN):  bisacodyl 5 milliGRAM(s) Oral daily PRN Constipation--1st Line  cyclobenzaprine 5 milliGRAM(s) Oral three times a day PRN Muscle Spasm  HYDROmorphone  Injectable 0.5 milliGRAM(s) IV Push every 4 hours PRN break through pain  lactulose Syrup 20 Gram(s) Oral two times a day PRN constipation  oxycodone    5 mG/acetaminophen 325 mG 1 Tablet(s) Oral every 4 hours PRN Moderate Pain (4 - 6)  oxycodone    5 mG/acetaminophen 325 mG 2 Tablet(s) Oral every 6 hours PRN Severe Pain (7 - 10)  senna 2 Tablet(s) Oral at bedtime PRN Constipation--2nd Line    Admitted Anthropometrics:  Ht (8/14): 165.1cm, Wt (8/14): 86.2kg, IBW: 56.8kg+/-10%, %IBW: 151%, BMI: 31.6     Nutrition Focused Physical Exam: Completed [ X on 8/18  ]  Unable to complete [   ]  No significant findings on NFPE    Estimated energy needs:   IBW (56.8kg) used to calculate energy needs due to pt's current body weight exceeding 120% of IBW.  Needs adjusted for pre-op, overweight status).     1531-3584 kcal (25-30 kcal/kg IBW)  68-80gm protein (1.2-1.4gm protein/kg IBW)  1420-1704mL (25-30 mL/kg IBW)    Subjective: 24 y/o female with PMHX PCOS, asthma, p/w worsening LBP, a/w RLE weakness, numbness, noted to have L5-S1 HNP. Preop for OR tomorrow AM per chart. Pt seen resting in bed. Pt states she was at a hospital for ~ 3weeks prior to admission to [facility], states her PO intake was decreased there due to a combination of decreased appetite as well as dislike of food there. Pt is eating much better here, appetite increased and pt enjoying the food, consuming >75% of meals. Pt states her UBW is 190lb, pt does not feel like she lost weight PTA, states her body feels bloated. Bed scale is 101.4kg this AM which is likely inaccurate. RD to monitor and f/u per protocol.    Nutrition Diagnosis:  Increased nutrient need increased protein demand AEB pre-op    Goal: Pt to meet >/=75%EER PO with good tolerance    Recommendations:  1. Recommend continue regular diet as medically appropriate  2. Monitor labs (BMP, lytes); replete lytes prn  3. Trend weekly weights    Education: Encouraged continued adequate PO intake with emphasis on lean protein- pt appeared receptive    Risk Level: High [   ] Moderate [ X ]  Low [   ]

## 2020-08-18 NOTE — PROGRESS NOTE ADULT - SUBJECTIVE AND OBJECTIVE BOX
Interval Events: Reviewed  Patient seen and examined at bedside.    Patient is a 25y old  Female who presents with a chief complaint of worsening LBP pain/right leg weakness. (18 Aug 2020 07:44)    she is about the same  PAST MEDICAL & SURGICAL HISTORY:  Lumbar radiculopathy: 2017  PCOS (polycystic ovarian syndrome)  Asthma  No significant past surgical history      MEDICATIONS:  Pulmonary:    Antimicrobials:    Anticoagulants:    Cardiac:      Allergies    No Known Allergies    Intolerances        Vital Signs Last 24 Hrs  T(C): 36.7 (18 Aug 2020 20:16), Max: 36.9 (18 Aug 2020 15:41)  T(F): 98.1 (18 Aug 2020 20:16), Max: 98.5 (18 Aug 2020 15:41)  HR: 92 (18 Aug 2020 20:16) (59 - 92)  BP: 126/70 (18 Aug 2020 20:16) (91/51 - 126/70)  BP(mean): --  RR: 17 (18 Aug 2020 20:16) (15 - 17)  SpO2: 96% (18 Aug 2020 20:16) (96% - 98%)    08-17 @ 07:01  -  08-18 @ 07:00  --------------------------------------------------------  IN: 0 mL / OUT: 1060 mL / NET: -1060 mL          Review of Systems:   •	General: negative  •	Skin/Breast: negative  •	Ophthalmologic: negative  •	ENMT: negative  •	Respiratory and Thorax: negative  •	Cardiovascular: negative  •	Gastrointestinal: negative  •	Genitourinary: negative  •	Musculoskeletal: negative  •	Neurological: negative  •	Psychiatric: negative  •	Hematology/Lymphatics: negative  •	Endocrine: negative  •	Allergic/Immunologic: negative    Physical Exam:   • Constitutional:	Well-developed, well nourished  • Eyes:	EOMI; PERRL; no drainage or redness  • ENMT:	No oral lesions; no gross abnormalities  • Neck	No bruits; no thyromegaly or nodules  • Breasts:	not examined  • Back:	No deformity or limitation of movement  • Respiratory:	Breath Sounds equal & clear to percussion & auscultation, no accessory muscle use  • Cardiovascular:	Regular rate & rhythm, normal S1, S2; no murmurs, gallops or rubs; no S3, S4  • Gastrointestinal:	Soft, non-tender, no hepatosplenomegaly, normal bowel sounds  • Genitourinary:	not examined  • Rectal: not examined  • Extremities:	No cyanosis, clubbing or edema  • Vascular:	Equal and normal pulses (carotid, femoral, dorsalis pedis)  • Neurologica:l	not examined  • Skin:	No lesions; no rash  • Lymph Nodes:	No lymphadedenopathy  • Musculoskeletal:	No joint pain, swelling or deformity; no limitation of movement        LABS:      CBC Full  -  ( 18 Aug 2020 06:23 )  WBC Count : 15.73 K/uL  RBC Count : 4.58 M/uL  Hemoglobin : 12.7 g/dL  Hematocrit : 39.5 %  Platelet Count - Automated : 374 K/uL  Mean Cell Volume : 86.2 fl  Mean Cell Hemoglobin : 27.7 pg  Mean Cell Hemoglobin Concentration : 32.2 gm/dL  Auto Neutrophil # : x  Auto Lymphocyte # : x  Auto Monocyte # : x  Auto Eosinophil # : x  Auto Basophil # : x  Auto Neutrophil % : x  Auto Lymphocyte % : x  Auto Monocyte % : x  Auto Eosinophil % : x  Auto Basophil % : x    08-18    138  |  99  |  17  ----------------------------<  106<H>  4.7   |  26  |  0.61    Ca    9.3      18 Aug 2020 06:23      PT/INR - ( 18 Aug 2020 12:47 )   PT: 12.9 sec;   INR: 1.08          PTT - ( 18 Aug 2020 12:47 )  PTT:26.5 sec                    RADIOLOGY & ADDITIONAL STUDIES (The following images were personally reviewed):  Hendrix:                                     No  Urine output:                       adequate  DVT prophylaxis:                 Yes  Flattus:                                  Yes  Bowel movement:              No

## 2020-08-19 LAB
ANION GAP SERPL CALC-SCNC: 9 MMOL/L — SIGNIFICANT CHANGE UP (ref 5–17)
BUN SERPL-MCNC: 19 MG/DL — SIGNIFICANT CHANGE UP (ref 7–23)
CALCIUM SERPL-MCNC: 9.4 MG/DL — SIGNIFICANT CHANGE UP (ref 8.4–10.5)
CHLORIDE SERPL-SCNC: 101 MMOL/L — SIGNIFICANT CHANGE UP (ref 96–108)
CO2 SERPL-SCNC: 28 MMOL/L — SIGNIFICANT CHANGE UP (ref 22–31)
CREAT SERPL-MCNC: 0.72 MG/DL — SIGNIFICANT CHANGE UP (ref 0.5–1.3)
GLUCOSE SERPL-MCNC: 99 MG/DL — SIGNIFICANT CHANGE UP (ref 70–99)
HCT VFR BLD CALC: 38.8 % — SIGNIFICANT CHANGE UP (ref 34.5–45)
HGB BLD-MCNC: 12.8 G/DL — SIGNIFICANT CHANGE UP (ref 11.5–15.5)
MCHC RBC-ENTMCNC: 28.6 PG — SIGNIFICANT CHANGE UP (ref 27–34)
MCHC RBC-ENTMCNC: 33 GM/DL — SIGNIFICANT CHANGE UP (ref 32–36)
MCV RBC AUTO: 86.8 FL — SIGNIFICANT CHANGE UP (ref 80–100)
NRBC # BLD: 0 /100 WBCS — SIGNIFICANT CHANGE UP (ref 0–0)
PLATELET # BLD AUTO: 375 K/UL — SIGNIFICANT CHANGE UP (ref 150–400)
POTASSIUM SERPL-MCNC: 4.5 MMOL/L — SIGNIFICANT CHANGE UP (ref 3.5–5.3)
POTASSIUM SERPL-SCNC: 4.5 MMOL/L — SIGNIFICANT CHANGE UP (ref 3.5–5.3)
RBC # BLD: 4.47 M/UL — SIGNIFICANT CHANGE UP (ref 3.8–5.2)
RBC # FLD: 13.2 % — SIGNIFICANT CHANGE UP (ref 10.3–14.5)
SODIUM SERPL-SCNC: 138 MMOL/L — SIGNIFICANT CHANGE UP (ref 135–145)
WBC # BLD: 15.96 K/UL — HIGH (ref 3.8–10.5)
WBC # FLD AUTO: 15.96 K/UL — HIGH (ref 3.8–10.5)

## 2020-08-19 PROCEDURE — 63030 LAMOT DCMPRN NRV RT 1 LMBR: CPT | Mod: LT

## 2020-08-19 PROCEDURE — 99024 POSTOP FOLLOW-UP VISIT: CPT

## 2020-08-19 PROCEDURE — 99232 SBSQ HOSP IP/OBS MODERATE 35: CPT

## 2020-08-19 RX ORDER — ACETAMINOPHEN 500 MG
650 TABLET ORAL EVERY 6 HOURS
Refills: 0 | Status: DISCONTINUED | OUTPATIENT
Start: 2020-08-19 | End: 2020-08-31

## 2020-08-19 RX ORDER — GABAPENTIN 400 MG/1
300 CAPSULE ORAL THREE TIMES A DAY
Refills: 0 | Status: DISCONTINUED | OUTPATIENT
Start: 2020-08-19 | End: 2020-08-31

## 2020-08-19 RX ORDER — IPRATROPIUM/ALBUTEROL SULFATE 18-103MCG
3 AEROSOL WITH ADAPTER (GRAM) INHALATION EVERY 6 HOURS
Refills: 0 | Status: DISCONTINUED | OUTPATIENT
Start: 2020-08-19 | End: 2020-08-31

## 2020-08-19 RX ORDER — SENNA PLUS 8.6 MG/1
2 TABLET ORAL AT BEDTIME
Refills: 0 | Status: DISCONTINUED | OUTPATIENT
Start: 2020-08-19 | End: 2020-08-31

## 2020-08-19 RX ORDER — BUPIVACAINE 13.3 MG/ML
20 INJECTION, SUSPENSION, LIPOSOMAL INFILTRATION ONCE
Refills: 0 | Status: DISCONTINUED | OUTPATIENT
Start: 2020-08-19 | End: 2020-08-31

## 2020-08-19 RX ORDER — OXYCODONE AND ACETAMINOPHEN 5; 325 MG/1; MG/1
2 TABLET ORAL EVERY 4 HOURS
Refills: 0 | Status: DISCONTINUED | OUTPATIENT
Start: 2020-08-19 | End: 2020-08-26

## 2020-08-19 RX ORDER — SODIUM CHLORIDE 9 MG/ML
1000 INJECTION INTRAMUSCULAR; INTRAVENOUS; SUBCUTANEOUS
Refills: 0 | Status: DISCONTINUED | OUTPATIENT
Start: 2020-08-19 | End: 2020-08-20

## 2020-08-19 RX ORDER — OXYCODONE AND ACETAMINOPHEN 5; 325 MG/1; MG/1
1 TABLET ORAL EVERY 4 HOURS
Refills: 0 | Status: DISCONTINUED | OUTPATIENT
Start: 2020-08-19 | End: 2020-08-26

## 2020-08-19 RX ORDER — DEXAMETHASONE 0.5 MG/5ML
4 ELIXIR ORAL EVERY 6 HOURS
Refills: 0 | Status: COMPLETED | OUTPATIENT
Start: 2020-08-19 | End: 2020-08-20

## 2020-08-19 RX ORDER — DEXAMETHASONE 0.5 MG/5ML
2 ELIXIR ORAL
Refills: 0 | Status: COMPLETED | OUTPATIENT
Start: 2020-08-24 | End: 2020-08-25

## 2020-08-19 RX ORDER — DEXAMETHASONE 0.5 MG/5ML
2 ELIXIR ORAL EVERY 8 HOURS
Refills: 0 | Status: COMPLETED | OUTPATIENT
Start: 2020-08-22 | End: 2020-08-24

## 2020-08-19 RX ORDER — HYDROMORPHONE HYDROCHLORIDE 2 MG/ML
0.5 INJECTION INTRAMUSCULAR; INTRAVENOUS; SUBCUTANEOUS ONCE
Refills: 0 | Status: DISCONTINUED | OUTPATIENT
Start: 2020-08-19 | End: 2020-08-19

## 2020-08-19 RX ORDER — HYDROMORPHONE HYDROCHLORIDE 2 MG/ML
0.5 INJECTION INTRAMUSCULAR; INTRAVENOUS; SUBCUTANEOUS
Refills: 0 | Status: DISCONTINUED | OUTPATIENT
Start: 2020-08-19 | End: 2020-08-26

## 2020-08-19 RX ORDER — DEXAMETHASONE 0.5 MG/5ML
ELIXIR ORAL
Refills: 0 | Status: COMPLETED | OUTPATIENT
Start: 2020-08-19 | End: 2020-08-26

## 2020-08-19 RX ORDER — CEFAZOLIN SODIUM 1 G
2000 VIAL (EA) INJECTION EVERY 8 HOURS
Refills: 0 | Status: COMPLETED | OUTPATIENT
Start: 2020-08-19 | End: 2020-08-20

## 2020-08-19 RX ORDER — DIAZEPAM 5 MG
5 TABLET ORAL EVERY 8 HOURS
Refills: 0 | Status: DISCONTINUED | OUTPATIENT
Start: 2020-08-19 | End: 2020-08-26

## 2020-08-19 RX ORDER — DEXAMETHASONE 0.5 MG/5ML
2 ELIXIR ORAL ONCE
Refills: 0 | Status: COMPLETED | OUTPATIENT
Start: 2020-08-25 | End: 2020-08-26

## 2020-08-19 RX ORDER — CYCLOBENZAPRINE HYDROCHLORIDE 10 MG/1
5 TABLET, FILM COATED ORAL EVERY 8 HOURS
Refills: 0 | Status: DISCONTINUED | OUTPATIENT
Start: 2020-08-19 | End: 2020-08-19

## 2020-08-19 RX ORDER — DEXAMETHASONE 0.5 MG/5ML
4 ELIXIR ORAL EVERY 8 HOURS
Refills: 0 | Status: COMPLETED | OUTPATIENT
Start: 2020-08-20 | End: 2020-08-22

## 2020-08-19 RX ORDER — ONDANSETRON 8 MG/1
4 TABLET, FILM COATED ORAL EVERY 6 HOURS
Refills: 0 | Status: DISCONTINUED | OUTPATIENT
Start: 2020-08-19 | End: 2020-08-31

## 2020-08-19 RX ORDER — PANTOPRAZOLE SODIUM 20 MG/1
40 TABLET, DELAYED RELEASE ORAL
Refills: 0 | Status: COMPLETED | OUTPATIENT
Start: 2020-08-19 | End: 2020-08-26

## 2020-08-19 RX ADMIN — OXYCODONE AND ACETAMINOPHEN 2 TABLET(S): 5; 325 TABLET ORAL at 22:33

## 2020-08-19 RX ADMIN — HYDROMORPHONE HYDROCHLORIDE 0.5 MILLIGRAM(S): 2 INJECTION INTRAMUSCULAR; INTRAVENOUS; SUBCUTANEOUS at 05:35

## 2020-08-19 RX ADMIN — Medication 4 MILLIGRAM(S): at 19:42

## 2020-08-19 RX ADMIN — HYDROMORPHONE HYDROCHLORIDE 0.5 MILLIGRAM(S): 2 INJECTION INTRAMUSCULAR; INTRAVENOUS; SUBCUTANEOUS at 05:19

## 2020-08-19 RX ADMIN — SENNA PLUS 2 TABLET(S): 8.6 TABLET ORAL at 21:51

## 2020-08-19 RX ADMIN — OXYCODONE AND ACETAMINOPHEN 2 TABLET(S): 5; 325 TABLET ORAL at 00:51

## 2020-08-19 RX ADMIN — Medication 100 MILLIGRAM(S): at 21:51

## 2020-08-19 RX ADMIN — GABAPENTIN 300 MILLIGRAM(S): 400 CAPSULE ORAL at 06:36

## 2020-08-19 RX ADMIN — PANTOPRAZOLE SODIUM 40 MILLIGRAM(S): 20 TABLET, DELAYED RELEASE ORAL at 06:36

## 2020-08-19 RX ADMIN — Medication 4 MILLIGRAM(S): at 06:36

## 2020-08-19 RX ADMIN — OXYCODONE AND ACETAMINOPHEN 2 TABLET(S): 5; 325 TABLET ORAL at 09:09

## 2020-08-19 RX ADMIN — HYDROMORPHONE HYDROCHLORIDE 0.5 MILLIGRAM(S): 2 INJECTION INTRAMUSCULAR; INTRAVENOUS; SUBCUTANEOUS at 16:42

## 2020-08-19 RX ADMIN — OXYCODONE AND ACETAMINOPHEN 2 TABLET(S): 5; 325 TABLET ORAL at 23:33

## 2020-08-19 RX ADMIN — GABAPENTIN 300 MILLIGRAM(S): 400 CAPSULE ORAL at 21:51

## 2020-08-19 RX ADMIN — OXYCODONE AND ACETAMINOPHEN 2 TABLET(S): 5; 325 TABLET ORAL at 10:09

## 2020-08-19 RX ADMIN — Medication 5 MILLIGRAM(S): at 20:59

## 2020-08-19 RX ADMIN — OXYCODONE AND ACETAMINOPHEN 2 TABLET(S): 5; 325 TABLET ORAL at 18:58

## 2020-08-19 RX ADMIN — HYDROMORPHONE HYDROCHLORIDE 0.5 MILLIGRAM(S): 2 INJECTION INTRAMUSCULAR; INTRAVENOUS; SUBCUTANEOUS at 17:01

## 2020-08-19 RX ADMIN — HYDROMORPHONE HYDROCHLORIDE 0.5 MILLIGRAM(S): 2 INJECTION INTRAMUSCULAR; INTRAVENOUS; SUBCUTANEOUS at 19:36

## 2020-08-19 RX ADMIN — OXYCODONE AND ACETAMINOPHEN 1 TABLET(S): 5; 325 TABLET ORAL at 18:08

## 2020-08-19 RX ADMIN — Medication 1 APPLICATION(S): at 12:29

## 2020-08-19 NOTE — PROGRESS NOTE ADULT - SUBJECTIVE AND OBJECTIVE BOX
NEUROSURGERY POST OP NOTE:    POD# 0 S/P L5-S1 microdiscectomy    S: Pt seen and examined at bedside postop. States mild incision site pain. Denies worsening weakness/loss of sensation of extremities.      T(C): 35.6 (08-19-20 @ 16:10), Max: 36.7 (08-18-20 @ 20:16)  HR: 51 (08-19-20 @ 16:55) (48 - 92)  BP: 106/58 (08-19-20 @ 16:55) (104/70 - 132/77)  RR: 17 (08-19-20 @ 16:55) (14 - 18)  SpO2: 99% (08-19-20 @ 16:55) (96% - 100%)      08-18-20 @ 07:01  -  08-19-20 @ 07:00  --------------------------------------------------------  IN: 0 mL / OUT: 400 mL / NET: -400 mL        acetaminophen   Tablet .. 650 milliGRAM(s) Oral every 6 hours PRN  albuterol/ipratropium for Nebulization. 3 milliLiter(s) Nebulizer every 6 hours PRN  BUpivacaine liposome 1.3% Injectable (no eMAR) 20 milliLiter(s) Local Injection once  ceFAZolin   IVPB 2000 milliGRAM(s) IV Intermittent every 8 hours  cyclobenzaprine 5 milliGRAM(s) Oral every 8 hours PRN  dexAMETHasone     Tablet   Oral   dexAMETHasone     Tablet 4 milliGRAM(s) Oral every 6 hours  gabapentin 300 milliGRAM(s) Oral three times a day  HYDROmorphone  Injectable 0.5 milliGRAM(s) IV Push every 3 hours PRN  multivitamin 1 Tablet(s) Oral daily  ondansetron Injectable 4 milliGRAM(s) IV Push every 6 hours PRN  oxycodone    5 mG/acetaminophen 325 mG 1 Tablet(s) Oral every 4 hours PRN  oxycodone    5 mG/acetaminophen 325 mG 2 Tablet(s) Oral every 4 hours PRN  pantoprazole    Tablet 40 milliGRAM(s) Oral before breakfast  senna 2 Tablet(s) Oral at bedtime  sodium chloride 0.9%. 1000 milliLiter(s) IV Continuous <Continuous>    PHYSICAL EXAM:  Neurological: AA&O x 3, comfortable  CNII-XII: EOMI, PERRL, face symmetric  Cardiac: CTA b/l  Pulm: RRR  Abd: soft NTND  Rt HF/HE/PF 3/5, Rt EHL/GA/TS 2/5, LLE 5/5 throughout, UE 5/5 b/l  Dec sensation to light touch in L5-S1 dermatomes  Back incision site C/D/I, dressing in place    Assessment: 24 y/o female with PMHX PCOS, athma, p/w worsening LBP, a/w RLE weakness, numbness, has L5-S1 HNP, now s/p L5-S1 microdescectomy    Plan:  -Neuro/spinal checks  -Pain control percocet/flexeril, gabapentin  -1 week decadron taper  -F/u AM labs  -Encourage incentive spirometry  -Protonix while on steroids  -Postop ancef  -ADAT, IVF hyrdration while NPO  -D/w Dr. Denise NEUROSURGERY POST OP NOTE:    POD# 0 S/P L5-S1 microdiscectomy    S: Pt seen and examined at bedside postop. States mild incision site pain. Denies worsening weakness/loss of sensation of extremities.      T(C): 35.6 (08-19-20 @ 16:10), Max: 36.7 (08-18-20 @ 20:16)  HR: 51 (08-19-20 @ 16:55) (48 - 92)  BP: 106/58 (08-19-20 @ 16:55) (104/70 - 132/77)  RR: 17 (08-19-20 @ 16:55) (14 - 18)  SpO2: 99% (08-19-20 @ 16:55) (96% - 100%)      08-18-20 @ 07:01  -  08-19-20 @ 07:00  --------------------------------------------------------  IN: 0 mL / OUT: 400 mL / NET: -400 mL        acetaminophen   Tablet .. 650 milliGRAM(s) Oral every 6 hours PRN  albuterol/ipratropium for Nebulization. 3 milliLiter(s) Nebulizer every 6 hours PRN  BUpivacaine liposome 1.3% Injectable (no eMAR) 20 milliLiter(s) Local Injection once  ceFAZolin   IVPB 2000 milliGRAM(s) IV Intermittent every 8 hours  cyclobenzaprine 5 milliGRAM(s) Oral every 8 hours PRN  dexAMETHasone     Tablet   Oral   dexAMETHasone     Tablet 4 milliGRAM(s) Oral every 6 hours  gabapentin 300 milliGRAM(s) Oral three times a day  HYDROmorphone  Injectable 0.5 milliGRAM(s) IV Push every 3 hours PRN  multivitamin 1 Tablet(s) Oral daily  ondansetron Injectable 4 milliGRAM(s) IV Push every 6 hours PRN  oxycodone    5 mG/acetaminophen 325 mG 1 Tablet(s) Oral every 4 hours PRN  oxycodone    5 mG/acetaminophen 325 mG 2 Tablet(s) Oral every 4 hours PRN  pantoprazole    Tablet 40 milliGRAM(s) Oral before breakfast  senna 2 Tablet(s) Oral at bedtime  sodium chloride 0.9%. 1000 milliLiter(s) IV Continuous <Continuous>    PHYSICAL EXAM:  Neurological: AA&O x 3, comfortable  CNII-XII: EOMI, PERRL, face symmetric  Cardiac: CTA b/l  Pulm: RRR  Abd: soft NTND  Rt HF/HE/PF 3/5, Rt EHL/GA/TS 2/5, LLE 5/5 throughout, UE 5/5 b/l  Dec sensation to light touch in L5-S1 dermatomes  Back incision site C/D/I, dressing in place    Assessment: 26 y/o female with PMHX PCOS, athma, p/w worsening LBP, a/w RLE weakness, numbness, has L5-S1 HNP, now s/p L5-S1 microdescectomy    Plan:  -Neuro/spinal checks  -Pain control percocet/flexeril, gabapentin  -1 week decadron taper  -F/u AM labs  -Encourage incentive spirometry  -Protonix while on steroids  -PT/OOB  -Postop ancef  -ADAT, IVF hyrdration while NPO  -D/w Dr. Denise

## 2020-08-19 NOTE — PRE-OP CHECKLIST - SELECT TESTS ORDERED
CMP/Type and Cross/Type and Screen/HCG/Urinalysis/EKG/BMP/CBC/INR CMP/Type and Cross/Type and Screen/HCG/Urinalysis/CXR/EKG/CBC/INR/BMP

## 2020-08-19 NOTE — BRIEF OPERATIVE NOTE - NSICDXBRIEFPOSTOP_GEN_ALL_CORE_FT
POST-OP DIAGNOSIS:  Cauda equina syndrome 19-Aug-2020 15:56:27  Bernadine Lopez  Lumbar herniated disc 19-Aug-2020 15:56:18  Bernadine Lopez

## 2020-08-19 NOTE — PACU DISCHARGE NOTE - COMMENTS
back to room, 837 01. Pt AOx4, VSS, afebrile.no sign of distress or SOB noted. Valium 5mg given as ordered. dressing intact, no bleeding noted. Pulses to dorsalis palpable but fedal pulses needs doppler. IVL patent and intact, no infiltration or phlebitis noted, NS@ 50ml/hr in progress. Pt voided. Report given to RN Collete.

## 2020-08-19 NOTE — BRIEF OPERATIVE NOTE - NSICDXBRIEFPROCEDURE_GEN_ALL_CORE_FT
PROCEDURES:  Discectomy, spine, lumbar, 1 level, minimally invasive 19-Aug-2020 15:55:33 L5-S1 Bernadine Lopez

## 2020-08-19 NOTE — BRIEF OPERATIVE NOTE - NSICDXBRIEFPREOP_GEN_ALL_CORE_FT
PRE-OP DIAGNOSIS:  Cauda equina syndrome 19-Aug-2020 15:56:07  Bernadine Lopez  Lumbar herniated disc 19-Aug-2020 15:55:56  Bernadine Lopez

## 2020-08-20 DIAGNOSIS — Z98.890 OTHER SPECIFIED POSTPROCEDURAL STATES: ICD-10-CM

## 2020-08-20 DIAGNOSIS — D72.828 OTHER ELEVATED WHITE BLOOD CELL COUNT: ICD-10-CM

## 2020-08-20 LAB
ANION GAP SERPL CALC-SCNC: 13 MMOL/L — SIGNIFICANT CHANGE UP (ref 5–17)
BASOPHILS # BLD AUTO: 0 K/UL — SIGNIFICANT CHANGE UP (ref 0–0.2)
BASOPHILS NFR BLD AUTO: 0 % — SIGNIFICANT CHANGE UP (ref 0–2)
BUN SERPL-MCNC: 17 MG/DL — SIGNIFICANT CHANGE UP (ref 7–23)
CALCIUM SERPL-MCNC: 8.6 MG/DL — SIGNIFICANT CHANGE UP (ref 8.4–10.5)
CHLORIDE SERPL-SCNC: 99 MMOL/L — SIGNIFICANT CHANGE UP (ref 96–108)
CO2 SERPL-SCNC: 27 MMOL/L — SIGNIFICANT CHANGE UP (ref 22–31)
CREAT SERPL-MCNC: 0.68 MG/DL — SIGNIFICANT CHANGE UP (ref 0.5–1.3)
EOSINOPHIL # BLD AUTO: 0 K/UL — SIGNIFICANT CHANGE UP (ref 0–0.5)
EOSINOPHIL NFR BLD AUTO: 0 % — SIGNIFICANT CHANGE UP (ref 0–6)
GLUCOSE SERPL-MCNC: 118 MG/DL — HIGH (ref 70–99)
HCT VFR BLD CALC: 42.5 % — SIGNIFICANT CHANGE UP (ref 34.5–45)
HGB BLD-MCNC: 13.5 G/DL — SIGNIFICANT CHANGE UP (ref 11.5–15.5)
LYMPHOCYTES # BLD AUTO: 1.68 K/UL — SIGNIFICANT CHANGE UP (ref 1–3.3)
LYMPHOCYTES # BLD AUTO: 9.1 % — LOW (ref 13–44)
MCHC RBC-ENTMCNC: 27.8 PG — SIGNIFICANT CHANGE UP (ref 27–34)
MCHC RBC-ENTMCNC: 31.8 GM/DL — LOW (ref 32–36)
MCV RBC AUTO: 87.6 FL — SIGNIFICANT CHANGE UP (ref 80–100)
MONOCYTES # BLD AUTO: 1.18 K/UL — HIGH (ref 0–0.9)
MONOCYTES NFR BLD AUTO: 6.4 % — SIGNIFICANT CHANGE UP (ref 2–14)
NEUTROPHILS # BLD AUTO: 15.28 K/UL — HIGH (ref 1.8–7.4)
NEUTROPHILS NFR BLD AUTO: 82.7 % — HIGH (ref 43–77)
PLATELET # BLD AUTO: 394 K/UL — SIGNIFICANT CHANGE UP (ref 150–400)
POTASSIUM SERPL-MCNC: 4.3 MMOL/L — SIGNIFICANT CHANGE UP (ref 3.5–5.3)
POTASSIUM SERPL-SCNC: 4.3 MMOL/L — SIGNIFICANT CHANGE UP (ref 3.5–5.3)
RBC # BLD: 4.85 M/UL — SIGNIFICANT CHANGE UP (ref 3.8–5.2)
RBC # FLD: 13.2 % — SIGNIFICANT CHANGE UP (ref 10.3–14.5)
SODIUM SERPL-SCNC: 139 MMOL/L — SIGNIFICANT CHANGE UP (ref 135–145)
WBC # BLD: 18.48 K/UL — HIGH (ref 3.8–10.5)
WBC # FLD AUTO: 18.48 K/UL — HIGH (ref 3.8–10.5)

## 2020-08-20 PROCEDURE — 99024 POSTOP FOLLOW-UP VISIT: CPT

## 2020-08-20 PROCEDURE — 99232 SBSQ HOSP IP/OBS MODERATE 35: CPT | Mod: GC

## 2020-08-20 RX ORDER — ENOXAPARIN SODIUM 100 MG/ML
40 INJECTION SUBCUTANEOUS AT BEDTIME
Refills: 0 | Status: DISCONTINUED | OUTPATIENT
Start: 2020-08-20 | End: 2020-08-31

## 2020-08-20 RX ADMIN — Medication 4 MILLIGRAM(S): at 11:35

## 2020-08-20 RX ADMIN — HYDROMORPHONE HYDROCHLORIDE 0.5 MILLIGRAM(S): 2 INJECTION INTRAMUSCULAR; INTRAVENOUS; SUBCUTANEOUS at 12:15

## 2020-08-20 RX ADMIN — Medication 5 MILLIGRAM(S): at 21:48

## 2020-08-20 RX ADMIN — HYDROMORPHONE HYDROCHLORIDE 0.5 MILLIGRAM(S): 2 INJECTION INTRAMUSCULAR; INTRAVENOUS; SUBCUTANEOUS at 18:45

## 2020-08-20 RX ADMIN — ENOXAPARIN SODIUM 40 MILLIGRAM(S): 100 INJECTION SUBCUTANEOUS at 21:09

## 2020-08-20 RX ADMIN — HYDROMORPHONE HYDROCHLORIDE 0.5 MILLIGRAM(S): 2 INJECTION INTRAMUSCULAR; INTRAVENOUS; SUBCUTANEOUS at 23:57

## 2020-08-20 RX ADMIN — OXYCODONE AND ACETAMINOPHEN 2 TABLET(S): 5; 325 TABLET ORAL at 22:20

## 2020-08-20 RX ADMIN — Medication 4 MILLIGRAM(S): at 00:08

## 2020-08-20 RX ADMIN — OXYCODONE AND ACETAMINOPHEN 2 TABLET(S): 5; 325 TABLET ORAL at 10:09

## 2020-08-20 RX ADMIN — Medication 1 TABLET(S): at 11:35

## 2020-08-20 RX ADMIN — HYDROMORPHONE HYDROCHLORIDE 0.5 MILLIGRAM(S): 2 INJECTION INTRAMUSCULAR; INTRAVENOUS; SUBCUTANEOUS at 00:12

## 2020-08-20 RX ADMIN — OXYCODONE AND ACETAMINOPHEN 2 TABLET(S): 5; 325 TABLET ORAL at 21:20

## 2020-08-20 RX ADMIN — Medication 100 MILLIGRAM(S): at 04:55

## 2020-08-20 RX ADMIN — HYDROMORPHONE HYDROCHLORIDE 0.5 MILLIGRAM(S): 2 INJECTION INTRAMUSCULAR; INTRAVENOUS; SUBCUTANEOUS at 06:59

## 2020-08-20 RX ADMIN — OXYCODONE AND ACETAMINOPHEN 2 TABLET(S): 5; 325 TABLET ORAL at 17:48

## 2020-08-20 RX ADMIN — GABAPENTIN 300 MILLIGRAM(S): 400 CAPSULE ORAL at 21:09

## 2020-08-20 RX ADMIN — Medication 3 MILLILITER(S): at 04:57

## 2020-08-20 RX ADMIN — OXYCODONE AND ACETAMINOPHEN 2 TABLET(S): 5; 325 TABLET ORAL at 04:53

## 2020-08-20 RX ADMIN — HYDROMORPHONE HYDROCHLORIDE 0.5 MILLIGRAM(S): 2 INJECTION INTRAMUSCULAR; INTRAVENOUS; SUBCUTANEOUS at 18:31

## 2020-08-20 RX ADMIN — PANTOPRAZOLE SODIUM 40 MILLIGRAM(S): 20 TABLET, DELAYED RELEASE ORAL at 05:08

## 2020-08-20 RX ADMIN — Medication 4 MILLIGRAM(S): at 05:08

## 2020-08-20 RX ADMIN — OXYCODONE AND ACETAMINOPHEN 2 TABLET(S): 5; 325 TABLET ORAL at 16:48

## 2020-08-20 RX ADMIN — OXYCODONE AND ACETAMINOPHEN 2 TABLET(S): 5; 325 TABLET ORAL at 11:11

## 2020-08-20 RX ADMIN — OXYCODONE AND ACETAMINOPHEN 2 TABLET(S): 5; 325 TABLET ORAL at 06:00

## 2020-08-20 RX ADMIN — GABAPENTIN 300 MILLIGRAM(S): 400 CAPSULE ORAL at 15:10

## 2020-08-20 RX ADMIN — HYDROMORPHONE HYDROCHLORIDE 0.5 MILLIGRAM(S): 2 INJECTION INTRAMUSCULAR; INTRAVENOUS; SUBCUTANEOUS at 12:00

## 2020-08-20 RX ADMIN — HYDROMORPHONE HYDROCHLORIDE 0.5 MILLIGRAM(S): 2 INJECTION INTRAMUSCULAR; INTRAVENOUS; SUBCUTANEOUS at 23:42

## 2020-08-20 RX ADMIN — GABAPENTIN 300 MILLIGRAM(S): 400 CAPSULE ORAL at 05:08

## 2020-08-20 RX ADMIN — HYDROMORPHONE HYDROCHLORIDE 0.5 MILLIGRAM(S): 2 INJECTION INTRAMUSCULAR; INTRAVENOUS; SUBCUTANEOUS at 00:36

## 2020-08-20 RX ADMIN — Medication 4 MILLIGRAM(S): at 21:09

## 2020-08-20 RX ADMIN — HYDROMORPHONE HYDROCHLORIDE 0.5 MILLIGRAM(S): 2 INJECTION INTRAMUSCULAR; INTRAVENOUS; SUBCUTANEOUS at 07:15

## 2020-08-20 RX ADMIN — SENNA PLUS 2 TABLET(S): 8.6 TABLET ORAL at 21:09

## 2020-08-20 NOTE — PHYSICAL THERAPY INITIAL EVALUATION ADULT - PERTINENT HX OF CURRENT PROBLEM, REHAB EVAL
25F who presented to the ED after being discharged from OSH ([facility]) with continued LBP radiating to the RLE and now to the LLE. Patient was admitted and worked up for cauda equina at [facility] and treated for L5S1 HNP with LICHA, steroids, gabapentin and percocet. States that she was in OSH x 3 weeks, went home, and was unable to ambulate therefore presents to [facility]. S/P workup, now s/p above procedure.

## 2020-08-20 NOTE — PROGRESS NOTE ADULT - SUBJECTIVE AND OBJECTIVE BOX
Interval Events: Reviewed  Patient seen and examined at bedside.    Patient is a 25y old  Female who presents with a chief complaint of worsening LBP pain/right leg weakness. (20 Aug 2020 00:11)  she is still in pain and legs are numb    PAST MEDICAL & SURGICAL HISTORY:  Lumbar radiculopathy: 2017  PCOS (polycystic ovarian syndrome)  Asthma  No significant past surgical history      MEDICATIONS:  Pulmonary:  albuterol/ipratropium for Nebulization. 3 milliLiter(s) Nebulizer every 6 hours PRN    Antimicrobials:    Anticoagulants:  enoxaparin Injectable 40 milliGRAM(s) SubCutaneous at bedtime    Cardiac:      Allergies    No Known Allergies    Intolerances        Vital Signs Last 24 Hrs  T(C): 36.7 (20 Aug 2020 20:05), Max: 37 (20 Aug 2020 00:06)  T(F): 98.1 (20 Aug 2020 20:05), Max: 98.6 (20 Aug 2020 00:06)  HR: 84 (20 Aug 2020 20:05) (60 - 88)  BP: 107/69 (20 Aug 2020 20:05) (105/70 - 127/79)  BP(mean): --  RR: 16 (20 Aug 2020 20:05) (15 - 17)  SpO2: 97% (20 Aug 2020 20:05) (95% - 98%)    08-19 @ 07:01  -  08-20 @ 07:00  --------------------------------------------------------  IN: 300 mL / OUT: 900 mL / NET: -600 mL    08-20 @ 07:01  -  08-20 @ 22:01  --------------------------------------------------------  IN: 660 mL / OUT: 2050 mL / NET: -1390 mL          Review of Systems:   •	General: negative  •	Skin/Breast: negative  •	Ophthalmologic: negative  •	ENMT: negative  •	Respiratory and Thorax: negative  •	Cardiovascular: negative  •	Gastrointestinal: negative  •	Genitourinary: negative  •	Musculoskeletal: negative  •	Neurological: negative  •	Psychiatric: negative  •	Hematology/Lymphatics: negative  •	Endocrine: negative  •	Allergic/Immunologic: negative    Physical Exam:   • Constitutional:	Well-developed, well nourished  • Eyes:	EOMI; PERRL; no drainage or redness  • ENMT:	No oral lesions; no gross abnormalities  • Neck	No bruits; no thyromegaly or nodules  • Breasts:	not examined  • Back:	No deformity or limitation of movement  • Respiratory:	Breath Sounds equal & clear to percussion & auscultation, no accessory muscle use  • Cardiovascular:	Regular rate & rhythm, normal S1, S2; no murmurs, gallops or rubs; no S3, S4  • Gastrointestinal:	Soft, non-tender, no hepatosplenomegaly, normal bowel sounds  • Genitourinary:	not examined  • Rectal: not examined  • Extremities:	No cyanosis, clubbing or edema  • Vascular:	Equal and normal pulses (carotid, femoral, dorsalis pedis)  • Neurologica:l	not examined  • Skin:	No lesions; no rash  • Lymph Nodes:	No lymphadedenopathy  • Musculoskeletal:	No joint pain, swelling or deformity; no limitation of movement        LABS:      CBC Full  -  ( 20 Aug 2020 06:02 )  WBC Count : 18.48 K/uL  RBC Count : 4.85 M/uL  Hemoglobin : 13.5 g/dL  Hematocrit : 42.5 %  Platelet Count - Automated : 394 K/uL  Mean Cell Volume : 87.6 fl  Mean Cell Hemoglobin : 27.8 pg  Mean Cell Hemoglobin Concentration : 31.8 gm/dL  Auto Neutrophil # : 15.28 K/uL  Auto Lymphocyte # : 1.68 K/uL  Auto Monocyte # : 1.18 K/uL  Auto Eosinophil # : 0.00 K/uL  Auto Basophil # : 0.00 K/uL  Auto Neutrophil % : 82.7 %  Auto Lymphocyte % : 9.1 %  Auto Monocyte % : 6.4 %  Auto Eosinophil % : 0.0 %  Auto Basophil % : 0.0 %    08-20    139  |  99  |  17  ----------------------------<  118<H>  4.3   |  27  |  0.68    Ca    8.6      20 Aug 2020 06:02                          RADIOLOGY & ADDITIONAL STUDIES (The following images were personally reviewed):  Hendrix:                                     No  Urine output:                       adequate  DVT prophylaxis:                 Yes  Flattus:                                  Yes  Bowel movement:              No

## 2020-08-20 NOTE — PHYSICAL THERAPY INITIAL EVALUATION ADULT - ADDITIONAL COMMENTS
Patient reports that she was in [facility] x 3 weeks and was "in bed most of the time." Prior to that admission, reports being independent with all mobility and ADLs without use of assistive device. Reports being sent home from OSH with a rolling walker. Lives in a 3-family house, has 3 MARGIE the building, then 10 steps followed by an additional 4 steps to her unit. Lives with parents.

## 2020-08-20 NOTE — OCCUPATIONAL THERAPY INITIAL EVALUATION ADULT - PERTINENT HX OF CURRENT PROBLEM, REHAB EVAL
25F who presented to the ED after being discharged from OSH ([facility]) with continued LBP radiating to the RLE and now to the LLE. Patient was admitted and worked up for cauda equina at [facility] and treated for L5S1 HNP with LICHA, steroids, gabapentin and percocet. States that she was in OSH x 3 weeks, went home, and was unable to ambulate therefore presents to [facility]. S/P workup, now s/p above procedure.

## 2020-08-20 NOTE — PHYSICAL THERAPY INITIAL EVALUATION ADULT - PASSIVE RANGE OF MOTION EXAMINATION, REHAB EVAL
bilateral lower extremity Passive ROM was WNL/bilateral upper extremity Passive ROM was WFL (within functional limits)

## 2020-08-20 NOTE — PHYSICAL THERAPY INITIAL EVALUATION ADULT - LIGHT TOUCH SENSATION, RLE, REHAB EVAL
Patient reports decreased sensation throughout (R) LE and complete numbness in (R) Foot. Of note, proprioception intact in hallux.

## 2020-08-20 NOTE — PHYSICAL THERAPY INITIAL EVALUATION ADULT - GENERAL OBSERVATIONS, REHAB EVAL
Patient received supine in bed with + heplock IV, room air, (B) SCDs. Patient in no apparent distress.

## 2020-08-20 NOTE — OCCUPATIONAL THERAPY INITIAL EVALUATION ADULT - ADDITIONAL COMMENTS
Pt reports she was at [facility] for 3 weeks and was mostly in bed. Prior to that admission pt was fully independent.

## 2020-08-20 NOTE — PHYSICAL THERAPY INITIAL EVALUATION ADULT - PHYSICAL ASSIST/NONPHYSICAL ASSIST: SIT/SUPINE, REHAB EVAL
2 person assist/with increased time to complete task. Patient able to lift (B) LEs into bed without hands-on assist from PT./verbal cues

## 2020-08-20 NOTE — OCCUPATIONAL THERAPY INITIAL EVALUATION ADULT - PLANNED THERAPY INTERVENTIONS, OT EVAL
strengthening/ADL retraining/transfer training/ROM/IADL retraining/balance training/bed mobility training

## 2020-08-20 NOTE — PHYSICAL THERAPY INITIAL EVALUATION ADULT - CRITERIA FOR SKILLED THERAPEUTIC INTERVENTIONS
rehab potential/anticipated equipment needs at discharge/therapy frequency/anticipated discharge recommendation/impairments found

## 2020-08-20 NOTE — PROGRESS NOTE ADULT - SUBJECTIVE AND OBJECTIVE BOX
HPI:    OVERNIGHT EVENTS:  Vital Signs Last 24 Hrs  T(C): 37 (20 Aug 2020 00:06), Max: 37 (20 Aug 2020 00:06)  T(F): 98.6 (20 Aug 2020 00:06), Max: 98.6 (20 Aug 2020 00:06)  HR: 86 (20 Aug 2020 00:06) (48 - 86)  BP: 110/64 (20 Aug 2020 00:06) (104/70 - 132/77)  BP(mean): 78 (19 Aug 2020 20:48) (75 - 93)  RR: 17 (20 Aug 2020 00:06) (10 - 18)  SpO2: 96% (20 Aug 2020 00:06) (96% - 100%)    I&O's Detail    18 Aug 2020 07:01  -  19 Aug 2020 07:00  --------------------------------------------------------  IN:  Total IN: 0 mL    OUT:    Voided: 400 mL  Total OUT: 400 mL    Total NET: -400 mL      19 Aug 2020 07:01  -  20 Aug 2020 00:13  --------------------------------------------------------  IN:    sodium chloride 0.9%.: 300 mL  Total IN: 300 mL    OUT:    Voided: 900 mL  Total OUT: 900 mL    Total NET: -600 mL        I&O's Summary    18 Aug 2020 07:01  -  19 Aug 2020 07:00  --------------------------------------------------------  IN: 0 mL / OUT: 400 mL / NET: -400 mL    19 Aug 2020 07:01  -  20 Aug 2020 00:13  --------------------------------------------------------  IN: 300 mL / OUT: 900 mL / NET: -600 mL        PHYSICAL EXAM:  Neurological:    Motor exam:         [] Upper extremity              Bi(c5)  WE(c6)  EE(c7)   FF(c8)                                                R         5/5        5/5        5/5       5/5                                               L          5/5        5/5        5/5       5/5         [] Lower extremeity          HF(l2)   KE(l3)    TA(l4)   EHL(l5)  GS(s1)                                                 R        5/5        5/5        5/5       5/5         5/5                                               L         5/5        5/5       5/5       5/5          5/5                                                        [] warm well perfused; capillary refill <3 seconds     Sensation: [] intact to light touch  [] decreased:       Cardiovascular:  Respiratory:  Gastrointestinal:  Genitourinary:  Extremities:  Incision/Wound:    TUBES/LINES:  [] CVC  [] A-line  [] Lumbar Drain  [] Ventriculostomy  [] Other    DIET:  [] NPO  [] Mechanical  [] Tube feeds    LABS:                        12.8   15.96 )-----------( 375      ( 19 Aug 2020 07:13 )             38.8     08-19    138  |  101  |  19  ----------------------------<  99  4.5   |  28  |  0.72    Ca    9.4      19 Aug 2020 07:13      PT/INR - ( 18 Aug 2020 12:47 )   PT: 12.9 sec;   INR: 1.08          PTT - ( 18 Aug 2020 12:47 )  PTT:26.5 sec        CAPILLARY BLOOD GLUCOSE          Drug Levels: [] N/A    CSF Analysis: [] N/A      Allergies    No Known Allergies    Intolerances      MEDICATIONS:  Antibiotics:  ceFAZolin   IVPB 2000 milliGRAM(s) IV Intermittent every 8 hours    Neuro:  acetaminophen   Tablet .. 650 milliGRAM(s) Oral every 6 hours PRN  diazepam    Tablet 5 milliGRAM(s) Oral every 8 hours PRN  gabapentin 300 milliGRAM(s) Oral three times a day  HYDROmorphone  Injectable 0.5 milliGRAM(s) IV Push every 3 hours PRN  ondansetron Injectable 4 milliGRAM(s) IV Push every 6 hours PRN  oxycodone    5 mG/acetaminophen 325 mG 1 Tablet(s) Oral every 4 hours PRN  oxycodone    5 mG/acetaminophen 325 mG 2 Tablet(s) Oral every 4 hours PRN    Anticoagulation:    OTHER:  albuterol/ipratropium for Nebulization. 3 milliLiter(s) Nebulizer every 6 hours PRN  BUpivacaine liposome 1.3% Injectable (no eMAR) 20 milliLiter(s) Local Injection once  dexAMETHasone     Tablet   Oral   dexAMETHasone     Tablet 4 milliGRAM(s) Oral every 6 hours  dexAMETHasone     Tablet 4 milliGRAM(s) Oral every 8 hours  pantoprazole    Tablet 40 milliGRAM(s) Oral before breakfast  senna 2 Tablet(s) Oral at bedtime    IVF:  multivitamin 1 Tablet(s) Oral daily  sodium chloride 0.9%. 1000 milliLiter(s) IV Continuous <Continuous>    CULTURES:    RADIOLOGY & ADDITIONAL TESTS:      ASSESSMENT:  25y Female s/p    PLAN:  NEURO:    CARDIOVASCULAR:    PULMONARY:    RENAL:    GI:    HEME:    ID:    ENDO:    DVT PROPHYLAXIS:  [] Venodynes                                [] Heparin/Lovenox    FALL RISK:  [] Low Risk                                    [] Impulsive    DISPOSITION: HPI:  26 y/o female pmhx PCOS, asthma, and lumbar HNP 2017 treated w/ conservative managemetn presents to [facility] ED today after being discharged early from OSH with continued LBP radiating to the RLE and now to the LLE. Patient was admitted and worked up for cauda equina at [facility] and treated for L5S1 HNP with LICHA, steroids, gabapentin and percocet. She was discharged home today but is still unable to walk without assistance. She was scheduled to start outpatient PT but the pain and weakness is worsening. She also reports numbness to the RLE down to the foot now with tingling sensation in the LLE. Denies any recent trauma, falls, denies any recent illness, fever or chills. Denies saddle anesthesia, denies b/b dysfunction.    Hospital Course:  8/15: Patient was admitted and worked up for cauda equina at [facility] and treated for L5-S1 HNP with LICHA, steroids, gabapentin and percocet, transferred here for further workup  8/16: LAZARO overnight. Neuro exam stable.   8/17 LAZARO overnight, Neuro exam stable  8/18: LAZARO overnight. Neuro exam stable. Plan for OR tomorrow   8/19: POD# 0 S/P L5-S1 microdiscectomy. Pt seen and examined at bedside postop. States mild incision site pain. Denies worsening weakness/loss of sensation of extremities.  8/20: LAZARO overnight, neuro stable. Patient c/o LLE weakness and numbness post op overnight     OVERNIGHT EVENTS:  Vital Signs Last 24 Hrs  T(C): 37 (20 Aug 2020 00:06), Max: 37 (20 Aug 2020 00:06)  T(F): 98.6 (20 Aug 2020 00:06), Max: 98.6 (20 Aug 2020 00:06)  HR: 86 (20 Aug 2020 00:06) (48 - 86)  BP: 110/64 (20 Aug 2020 00:06) (104/70 - 132/77)  BP(mean): 78 (19 Aug 2020 20:48) (75 - 93)  RR: 17 (20 Aug 2020 00:06) (10 - 18)  SpO2: 96% (20 Aug 2020 00:06) (96% - 100%)    I&O's Detail    18 Aug 2020 07:01  -  19 Aug 2020 07:00  --------------------------------------------------------  IN:  Total IN: 0 mL    OUT:    Voided: 400 mL  Total OUT: 400 mL    Total NET: -400 mL      19 Aug 2020 07:01  -  20 Aug 2020 00:13  --------------------------------------------------------  IN:    sodium chloride 0.9%.: 300 mL  Total IN: 300 mL    OUT:    Voided: 900 mL  Total OUT: 900 mL    Total NET: -600 mL        I&O's Summary    18 Aug 2020 07:01  -  19 Aug 2020 07:00  --------------------------------------------------------  IN: 0 mL / OUT: 400 mL / NET: -400 mL    19 Aug 2020 07:01  -  20 Aug 2020 00:13  --------------------------------------------------------  IN: 300 mL / OUT: 900 mL / NET: -600 mL        PHYSICAL EXAM:  Neurological:    Motor exam:  Gen: laying in hospital bed, NAD  Neuro: AA+Ox3, OE spont, FC  CN II-XII: PERRL, EOMI  Motor: b/l upper ext 5/5, LLE hip flex 3/5, knee flex 3/5, dorsi/plantar flex 4-/5. RLE hip flex 2/5, knee flex 2/5, dorsi/plantar flex 4/5.     TUBES/LINES:  [] CVC  [] A-line  [] Lumbar Drain  [] Ventriculostomy  [] Other    DIET:  [] NPO  [x] Mechanical  [] Tube feeds    LABS:                        12.8   15.96 )-----------( 375      ( 19 Aug 2020 07:13 )             38.8     08-19    138  |  101  |  19  ----------------------------<  99  4.5   |  28  |  0.72    Ca    9.4      19 Aug 2020 07:13      PT/INR - ( 18 Aug 2020 12:47 )   PT: 12.9 sec;   INR: 1.08          PTT - ( 18 Aug 2020 12:47 )  PTT:26.5 sec        CAPILLARY BLOOD GLUCOSE          Drug Levels: [] N/A    CSF Analysis: [] N/A      Allergies    No Known Allergies    Intolerances      MEDICATIONS:  Antibiotics:  ceFAZolin   IVPB 2000 milliGRAM(s) IV Intermittent every 8 hours    Neuro:  acetaminophen   Tablet .. 650 milliGRAM(s) Oral every 6 hours PRN  diazepam    Tablet 5 milliGRAM(s) Oral every 8 hours PRN  gabapentin 300 milliGRAM(s) Oral three times a day  HYDROmorphone  Injectable 0.5 milliGRAM(s) IV Push every 3 hours PRN  ondansetron Injectable 4 milliGRAM(s) IV Push every 6 hours PRN  oxycodone    5 mG/acetaminophen 325 mG 1 Tablet(s) Oral every 4 hours PRN  oxycodone    5 mG/acetaminophen 325 mG 2 Tablet(s) Oral every 4 hours PRN    Anticoagulation:    OTHER:  albuterol/ipratropium for Nebulization. 3 milliLiter(s) Nebulizer every 6 hours PRN  BUpivacaine liposome 1.3% Injectable (no eMAR) 20 milliLiter(s) Local Injection once  dexAMETHasone     Tablet   Oral   dexAMETHasone     Tablet 4 milliGRAM(s) Oral every 6 hours  dexAMETHasone     Tablet 4 milliGRAM(s) Oral every 8 hours  pantoprazole    Tablet 40 milliGRAM(s) Oral before breakfast  senna 2 Tablet(s) Oral at bedtime    IVF:  multivitamin 1 Tablet(s) Oral daily  sodium chloride 0.9%. 1000 milliLiter(s) IV Continuous <Continuous>    CULTURES:    RADIOLOGY & ADDITIONAL TESTS:      ASSESSMENT:  26 y/o female with PMHX PCOS, athma, p/w worsening LBP, a/w RLE weakness, numbness, has L5-S1 HNP, now s/p L5-S1 microdiscectomy (8/19) POD #1    PLAN:  - neuro checks  - vitals checks  - pain control  - decadron taper over 1 week  - regular diet  - bowel regimen   - GI ppx: protonix  - NS at 100    DVT PROPHYLAXIS:  [x] Venodynes                                [] Heparin/Lovenox    DISPOSITION: tele status, full code, dispo pending    d/w Dr. Denise    Assessment:  Present when checked    []  GCS  E   V  M     Heart Failure: []Acute, [] acute on chronic , []chronic  Heart Failure:  [] Diastolic (HFpEF), [] Systolic (HFrEF), []Combined (HFpEF and HFrEF), [] RHF, [] Pulm HTN, [] Other    [] ANDREA, [] ATN, [] AIN, [] other  [] CKD1, [] CKD2, [] CKD 3, [] CKD 4, [] CKD 5, []ESRD    Encephalopathy: [] Metabolic, [] Hepatic, [] toxic, [] Neurological, [] Other    Abnormal Nurtitional Status: [] malnurtition (see nutrition note), [ ]underweight: BMI < 19, [] morbid obesity: BMI >40, [] Cachexia    [] Sepsis  [] hypovolemic shock,[] cardiogenic shock, [] hemorrhagic shock, [] neuogenic shock  [] Acute Respiratory Failure  []Cerebral edema, [] Brain compression/ herniation,   [] Functional quadriplegia  [] Acute blood loss anemia

## 2020-08-20 NOTE — PHYSICAL THERAPY INITIAL EVALUATION ADULT - MANUAL MUSCLE TESTING RESULTS, REHAB EVAL
MMT as follows: Shoulder flexion (L) 3-/5, (R) 3-/5; elbow flexion (L) 3/5, (R) 3/5;  (L) 5/5, (R) 5/5; knee flexion (L) 3-/5, (R) 3-/5; ankle DF (L) 3-/5, (R) 3-/5; hallux ext (L) 3/5, (R) 3/5; ankle PF (L) 3/5, (R) 3/5. Of note, patient demo'd increased strength upon functional assessment and compared to formal MMT assessment.

## 2020-08-21 LAB
ANION GAP SERPL CALC-SCNC: 11 MMOL/L — SIGNIFICANT CHANGE UP (ref 5–17)
BASOPHILS # BLD AUTO: 0 K/UL — SIGNIFICANT CHANGE UP (ref 0–0.2)
BASOPHILS NFR BLD AUTO: 0 % — SIGNIFICANT CHANGE UP (ref 0–2)
BUN SERPL-MCNC: 22 MG/DL — SIGNIFICANT CHANGE UP (ref 7–23)
CALCIUM SERPL-MCNC: 8.8 MG/DL — SIGNIFICANT CHANGE UP (ref 8.4–10.5)
CHLORIDE SERPL-SCNC: 100 MMOL/L — SIGNIFICANT CHANGE UP (ref 96–108)
CO2 SERPL-SCNC: 26 MMOL/L — SIGNIFICANT CHANGE UP (ref 22–31)
CREAT SERPL-MCNC: 0.61 MG/DL — SIGNIFICANT CHANGE UP (ref 0.5–1.3)
EOSINOPHIL # BLD AUTO: 0 K/UL — SIGNIFICANT CHANGE UP (ref 0–0.5)
EOSINOPHIL NFR BLD AUTO: 0 % — SIGNIFICANT CHANGE UP (ref 0–6)
GLUCOSE SERPL-MCNC: 99 MG/DL — SIGNIFICANT CHANGE UP (ref 70–99)
HCT VFR BLD CALC: 39.6 % — SIGNIFICANT CHANGE UP (ref 34.5–45)
HGB BLD-MCNC: 13.1 G/DL — SIGNIFICANT CHANGE UP (ref 11.5–15.5)
LYMPHOCYTES # BLD AUTO: 14 % — SIGNIFICANT CHANGE UP (ref 13–44)
LYMPHOCYTES # BLD AUTO: 2.48 K/UL — SIGNIFICANT CHANGE UP (ref 1–3.3)
MCHC RBC-ENTMCNC: 28.4 PG — SIGNIFICANT CHANGE UP (ref 27–34)
MCHC RBC-ENTMCNC: 33.1 GM/DL — SIGNIFICANT CHANGE UP (ref 32–36)
MCV RBC AUTO: 85.9 FL — SIGNIFICANT CHANGE UP (ref 80–100)
MONOCYTES # BLD AUTO: 1.08 K/UL — HIGH (ref 0–0.9)
MONOCYTES NFR BLD AUTO: 6.1 % — SIGNIFICANT CHANGE UP (ref 2–14)
NEUTROPHILS # BLD AUTO: 13.51 K/UL — HIGH (ref 1.8–7.4)
NEUTROPHILS NFR BLD AUTO: 76.3 % — SIGNIFICANT CHANGE UP (ref 43–77)
PLATELET # BLD AUTO: 380 K/UL — SIGNIFICANT CHANGE UP (ref 150–400)
POTASSIUM SERPL-MCNC: 4.8 MMOL/L — SIGNIFICANT CHANGE UP (ref 3.5–5.3)
POTASSIUM SERPL-SCNC: 4.8 MMOL/L — SIGNIFICANT CHANGE UP (ref 3.5–5.3)
RBC # BLD: 4.61 M/UL — SIGNIFICANT CHANGE UP (ref 3.8–5.2)
RBC # FLD: 13.3 % — SIGNIFICANT CHANGE UP (ref 10.3–14.5)
SODIUM SERPL-SCNC: 137 MMOL/L — SIGNIFICANT CHANGE UP (ref 135–145)
WBC # BLD: 17.71 K/UL — HIGH (ref 3.8–10.5)
WBC # FLD AUTO: 17.71 K/UL — HIGH (ref 3.8–10.5)

## 2020-08-21 PROCEDURE — 72156 MRI NECK SPINE W/O & W/DYE: CPT | Mod: 26

## 2020-08-21 PROCEDURE — 70553 MRI BRAIN STEM W/O & W/DYE: CPT | Mod: 26

## 2020-08-21 PROCEDURE — 99024 POSTOP FOLLOW-UP VISIT: CPT

## 2020-08-21 PROCEDURE — 99232 SBSQ HOSP IP/OBS MODERATE 35: CPT | Mod: GC

## 2020-08-21 RX ADMIN — Medication 5 MILLIGRAM(S): at 22:36

## 2020-08-21 RX ADMIN — OXYCODONE AND ACETAMINOPHEN 2 TABLET(S): 5; 325 TABLET ORAL at 10:46

## 2020-08-21 RX ADMIN — HYDROMORPHONE HYDROCHLORIDE 0.5 MILLIGRAM(S): 2 INJECTION INTRAMUSCULAR; INTRAVENOUS; SUBCUTANEOUS at 05:24

## 2020-08-21 RX ADMIN — ENOXAPARIN SODIUM 40 MILLIGRAM(S): 100 INJECTION SUBCUTANEOUS at 21:11

## 2020-08-21 RX ADMIN — Medication 4 MILLIGRAM(S): at 13:53

## 2020-08-21 RX ADMIN — GABAPENTIN 300 MILLIGRAM(S): 400 CAPSULE ORAL at 13:53

## 2020-08-21 RX ADMIN — Medication 1 TABLET(S): at 13:53

## 2020-08-21 RX ADMIN — HYDROMORPHONE HYDROCHLORIDE 0.5 MILLIGRAM(S): 2 INJECTION INTRAMUSCULAR; INTRAVENOUS; SUBCUTANEOUS at 05:39

## 2020-08-21 RX ADMIN — OXYCODONE AND ACETAMINOPHEN 2 TABLET(S): 5; 325 TABLET ORAL at 20:25

## 2020-08-21 RX ADMIN — HYDROMORPHONE HYDROCHLORIDE 0.5 MILLIGRAM(S): 2 INJECTION INTRAMUSCULAR; INTRAVENOUS; SUBCUTANEOUS at 12:31

## 2020-08-21 RX ADMIN — OXYCODONE AND ACETAMINOPHEN 2 TABLET(S): 5; 325 TABLET ORAL at 19:48

## 2020-08-21 RX ADMIN — HYDROMORPHONE HYDROCHLORIDE 0.5 MILLIGRAM(S): 2 INJECTION INTRAMUSCULAR; INTRAVENOUS; SUBCUTANEOUS at 21:30

## 2020-08-21 RX ADMIN — HYDROMORPHONE HYDROCHLORIDE 0.5 MILLIGRAM(S): 2 INJECTION INTRAMUSCULAR; INTRAVENOUS; SUBCUTANEOUS at 12:45

## 2020-08-21 RX ADMIN — OXYCODONE AND ACETAMINOPHEN 2 TABLET(S): 5; 325 TABLET ORAL at 09:46

## 2020-08-21 RX ADMIN — GABAPENTIN 300 MILLIGRAM(S): 400 CAPSULE ORAL at 21:11

## 2020-08-21 RX ADMIN — GABAPENTIN 300 MILLIGRAM(S): 400 CAPSULE ORAL at 05:24

## 2020-08-21 RX ADMIN — Medication 4 MILLIGRAM(S): at 21:11

## 2020-08-21 RX ADMIN — Medication 4 MILLIGRAM(S): at 05:24

## 2020-08-21 RX ADMIN — OXYCODONE AND ACETAMINOPHEN 2 TABLET(S): 5; 325 TABLET ORAL at 02:21

## 2020-08-21 RX ADMIN — PANTOPRAZOLE SODIUM 40 MILLIGRAM(S): 20 TABLET, DELAYED RELEASE ORAL at 05:24

## 2020-08-21 RX ADMIN — HYDROMORPHONE HYDROCHLORIDE 0.5 MILLIGRAM(S): 2 INJECTION INTRAMUSCULAR; INTRAVENOUS; SUBCUTANEOUS at 21:11

## 2020-08-21 RX ADMIN — OXYCODONE AND ACETAMINOPHEN 2 TABLET(S): 5; 325 TABLET ORAL at 03:22

## 2020-08-21 NOTE — PROGRESS NOTE ADULT - SUBJECTIVE AND OBJECTIVE BOX
NP Note Neurosurgery    Pt s/p lumbar laminectomy  Post op working with PT and can tolerate 3 hours of acute rehab prior to returning to home to regain her independence   Unable to sit for prolong period of time so would need an ambulance for transportion

## 2020-08-21 NOTE — PROGRESS NOTE ADULT - SUBJECTIVE AND OBJECTIVE BOX
Interval Events: Reviewed  Patient seen and examined at bedside.    Patient is a 25y old  Female who presents with a chief complaint of worsening LBP pain/right leg weakness. (21 Aug 2020 10:40)  she is about the same    PAST MEDICAL & SURGICAL HISTORY:  Lumbar radiculopathy: 2017  PCOS (polycystic ovarian syndrome)  Asthma  No significant past surgical history      MEDICATIONS:  Pulmonary:  albuterol/ipratropium for Nebulization. 3 milliLiter(s) Nebulizer every 6 hours PRN    Antimicrobials:    Anticoagulants:  enoxaparin Injectable 40 milliGRAM(s) SubCutaneous at bedtime    Cardiac:      Allergies    No Known Allergies    Intolerances        Vital Signs Last 24 Hrs  T(C): 36.8 (21 Aug 2020 15:13), Max: 36.8 (21 Aug 2020 09:12)  T(F): 98.2 (21 Aug 2020 15:13), Max: 98.3 (21 Aug 2020 09:12)  HR: 94 (21 Aug 2020 15:13) (63 - 94)  BP: 106/71 (21 Aug 2020 15:13) (96/62 - 117/65)  BP(mean): --  RR: 17 (21 Aug 2020 15:13) (15 - 17)  SpO2: 97% (21 Aug 2020 15:13) (95% - 97%)    08-20 @ 07:01  -  08-21 @ 07:00  --------------------------------------------------------  IN: 660 mL / OUT: 2050 mL / NET: -1390 mL    08-21 @ 07:01  -  08-21 @ 19:20  --------------------------------------------------------  IN: 600 mL / OUT: 1250 mL / NET: -650 mL          Review of Systems:   •	General: negative  •	Skin/Breast: negative  •	Ophthalmologic: negative  •	ENMT: negative  •	Respiratory and Thorax: negative  •	Cardiovascular: negative  •	Gastrointestinal: negative  •	Genitourinary: negative  •	Musculoskeletal: negative  •	Neurological: negative  •	Psychiatric: negative  •	Hematology/Lymphatics: negative  •	Endocrine: negative  •	Allergic/Immunologic: negative    Physical Exam:   • Constitutional:	Well-developed, well nourished  • Eyes:	EOMI; PERRL; no drainage or redness  • ENMT:	No oral lesions; no gross abnormalities  • Neck	No bruits; no thyromegaly or nodules  • Breasts:	not examined  • Back:	No deformity or limitation of movement  • Respiratory:	Breath Sounds equal & clear to percussion & auscultation, no accessory muscle use  • Cardiovascular:	Regular rate & rhythm, normal S1, S2; no murmurs, gallops or rubs; no S3, S4  • Gastrointestinal:	Soft, non-tender, no hepatosplenomegaly, normal bowel sounds  • Genitourinary:	not examined  • Rectal: not examined  • Extremities:	No cyanosis, clubbing or edema  • Vascular:	Equal and normal pulses (carotid, femoral, dorsalis pedis)  • Neurologica:l	not examined  • Skin:	No lesions; no rash  • Lymph Nodes:	No lymphadedenopathy  • Musculoskeletal:	No joint pain, swelling or deformity; no limitation of movement        LABS:      CBC Full  -  ( 21 Aug 2020 07:06 )  WBC Count : 17.71 K/uL  RBC Count : 4.61 M/uL  Hemoglobin : 13.1 g/dL  Hematocrit : 39.6 %  Platelet Count - Automated : 380 K/uL  Mean Cell Volume : 85.9 fl  Mean Cell Hemoglobin : 28.4 pg  Mean Cell Hemoglobin Concentration : 33.1 gm/dL  Auto Neutrophil # : 13.51 K/uL  Auto Lymphocyte # : 2.48 K/uL  Auto Monocyte # : 1.08 K/uL  Auto Eosinophil # : 0.00 K/uL  Auto Basophil # : 0.00 K/uL  Auto Neutrophil % : 76.3 %  Auto Lymphocyte % : 14.0 %  Auto Monocyte % : 6.1 %  Auto Eosinophil % : 0.0 %  Auto Basophil % : 0.0 %    08-21    137  |  100  |  22  ----------------------------<  99  4.8   |  26  |  0.61    Ca    8.8      21 Aug 2020 07:06                          RADIOLOGY & ADDITIONAL STUDIES (The following images were personally reviewed):  Hendrix:                                     No  Urine output:                       adequate  DVT prophylaxis:                 Yes  Flattus:                                  Yes  Bowel movement:              No

## 2020-08-21 NOTE — PROGRESS NOTE ADULT - SUBJECTIVE AND OBJECTIVE BOX
HPI:  HPI:  26 y/o female pmhx PCOS, asthma, and lumbar HNP 2017 treated w/ conservative managemetn presents to [facility] ED today after being discharged early from OSH with continued LBP radiating to the RLE and now to the LLE. Patient was admitted and worked up for cauda equina at [facility] and treated for L5S1 HNP with LICHA, steroids, gabapentin and percocet. She was discharged home today but is still unable to walk without assistance. She was scheduled to start outpatient PT but the pain and weakness is worsening. She also reports numbness to the RLE down to the foot now with tingling sensation in the LLE. Denies any recent trauma, falls, denies any recent illness, fever or chills. Denies saddle anesthesia, denies b/b dysfunction.    Hospital Course:  8/15: Patient was admitted and worked up for cauda equina at [facility] and treated for L5-S1 HNP with LICHA, steroids, gabapentin and percocet, transferred here for further workup  8/16: LAZARO overnight. Neuro exam stable.   8/17 LAZARO overnight, Neuro exam stable  8/18: LAZARO overnight. Neuro exam stable. Plan for OR tomorrow   8/19: POD# 0 S/P L5-S1 microdiscectomy. Pt seen and examined at bedside postop. States mild incision site pain. Denies worsening weakness/loss of sensation of extremities.  8/20: LAZARO overnight, neuro stable. Patient c/o LLE weakness and numbness post op overnight   8/21: LAZARO overnight, continued new  LLE weakness      Vital Signs Last 24 Hrs  T(C): 36.7 (20 Aug 2020 20:05), Max: 37 (20 Aug 2020 15:14)  T(F): 98.1 (20 Aug 2020 20:05), Max: 98.6 (20 Aug 2020 15:14)  HR: 84 (20 Aug 2020 20:05) (67 - 88)  BP: 107/69 (20 Aug 2020 20:05) (105/70 - 127/79)  BP(mean): --  RR: 16 (20 Aug 2020 20:05) (15 - 17)  SpO2: 97% (20 Aug 2020 20:05) (95% - 97%)    I&O's Summary    19 Aug 2020 07:01  -  20 Aug 2020 07:00  --------------------------------------------------------  IN: 300 mL / OUT: 900 mL / NET: -600 mL    20 Aug 2020 07:01  -  21 Aug 2020 03:21  --------------------------------------------------------  IN: 660 mL / OUT: 2050 mL / NET: -1390 mL        PHYSICAL EXAM:  General: patient seen laying supine in bed in NAD  Neuro: AAOx3, FC, OE spontaneously, speech clear and fluent,  CNII-XII grossly intact, no pronator drift, face symmetric, b/l upper ext 5/5, LLE hip flex 3/5, knee flex 3/5, dorsi/plantar flex 4/5. RLE hip flex 2/5, knee flex 2/5, dorsi/plantar flex 4/5.   HEENT: PERRL, EOMI  Neck: supple  Cardiac: RRR, S1S2  Pulmonary: chest rise symmetric  Abdomen: soft, nontender, nondistended  Ext: warm, perfusing well      TUBES/LINES:  [] Hendrix  [] Lumbar Drain  [] Wound Drains  [] Others      DIET:  [] NPO  [x] Mechanical  [] Tube feeds    LABS:                        13.5   18.48 )-----------( 394      ( 20 Aug 2020 06:02 )             42.5     08-20    139  |  99  |  17  ----------------------------<  118<H>  4.3   |  27  |  0.68    Ca    8.6      20 Aug 2020 06:02              CAPILLARY BLOOD GLUCOSE          Drug Levels: [] N/A    CSF Analysis: [] N/A      Allergies    No Known Allergies    Intolerances      MEDICATIONS:  Antibiotics:    Neuro:  acetaminophen   Tablet .. 650 milliGRAM(s) Oral every 6 hours PRN  diazepam    Tablet 5 milliGRAM(s) Oral every 8 hours PRN  gabapentin 300 milliGRAM(s) Oral three times a day  HYDROmorphone  Injectable 0.5 milliGRAM(s) IV Push every 3 hours PRN  ondansetron Injectable 4 milliGRAM(s) IV Push every 6 hours PRN  oxycodone    5 mG/acetaminophen 325 mG 1 Tablet(s) Oral every 4 hours PRN  oxycodone    5 mG/acetaminophen 325 mG 2 Tablet(s) Oral every 4 hours PRN    Anticoagulation:  enoxaparin Injectable 40 milliGRAM(s) SubCutaneous at bedtime    OTHER:  albuterol/ipratropium for Nebulization. 3 milliLiter(s) Nebulizer every 6 hours PRN  bisacodyl 5 milliGRAM(s) Oral at bedtime  BUpivacaine liposome 1.3% Injectable (no eMAR) 20 milliLiter(s) Local Injection once  dexAMETHasone     Tablet   Oral   dexAMETHasone     Tablet 4 milliGRAM(s) Oral every 8 hours  pantoprazole    Tablet 40 milliGRAM(s) Oral before breakfast  senna 2 Tablet(s) Oral at bedtime    IVF:  multivitamin 1 Tablet(s) Oral daily    CULTURES:    RADIOLOGY & ADDITIONAL TESTS:      ASSESSMENT:  26 y/o female with PMHX PCOS, asthma, p/w worsening LBP, a/w RLE weakness, numbness, has L5-S1 HNP, now s/p L5-S1 microdiscectomy (8/19) POD #2    LUMBAR RADICULOPATHY  No pertinent family history in first degree relatives  Handoff  MEWS Score  Lumbar radiculopathy  PCOS (polycystic ovarian syndrome)  Asthma  Lumbar radiculopathy  PCOS (polycystic ovarian syndrome)  Asthma  Cauda equina syndrome  Lumbar herniated disc  Cauda equina syndrome  Lumbar herniated disc  Lumbar radiculopathy  Other elevated white blood cell (WBC) count  Postoperative state  Preoperative clearance  Mild intermittent asthma without complication  Lumbar radiculopathy  Discectomy, spine, lumbar, 1 level, minimally invasive  No significant past surgical history  BACK PAIN  Lower extremity weakness  [email or web address]      PLAN:  NEURO:  - neuro checks  - vitals checks  - pain control  - decadron taper over 1 week, 4q8  - MRI brain and c-spine w/ and w/o contrast  - consult neurology for new LLE weakness    CARDIOVASCULAR:  -normotensive BP goal    PULMONARY:  - on RA    RENAL:  -replete electrolytes prn    GI:  - regular diet  - bowel regimen   - GI ppx: protonix    HEME:  -H&H stable    ID:  -afebrile  -elevated WBCs likely secondary to steroids        DVT PROPHYLAXIS:  [x] Venodynes                                [x] Heparin/Lovenox    DISPOSITION: tele status, full code, dispo pending, PT recs acute rehab    d/w Dr. Denise    Assessment:  Present when checked    []  GCS  E   V  M     Heart Failure: []Acute, [] acute on chronic , []chronic  Heart Failure:  [] Diastolic (HFpEF), [] Systolic (HFrEF), []Combined (HFpEF and HFrEF), [] RHF, [] Pulm HTN, [] Other    [] ANDREA, [] ATN, [] AIN, [] other  [] CKD1, [] CKD2, [] CKD 3, [] CKD 4, [] CKD 5, []ESRD    Encephalopathy: [] Metabolic, [] Hepatic, [] toxic, [] Neurological, [] Other    Abnormal Nurtitional Status: [] malnurtition (see nutrition note), [ ]underweight: BMI < 19, [] morbid obesity: BMI >40, [] Cachexia    [] Sepsis  [] hypovolemic shock,[] cardiogenic shock, [] hemorrhagic shock, [] neuogenic shock  [] Acute Respiratory Failure  []Cerebral edema, [] Brain compression/ herniation,   [] Functional quadriplegia  [] Acute blood loss anemia

## 2020-08-22 LAB
ANION GAP SERPL CALC-SCNC: 10 MMOL/L — SIGNIFICANT CHANGE UP (ref 5–17)
BASOPHILS # BLD AUTO: 0.05 K/UL — SIGNIFICANT CHANGE UP (ref 0–0.2)
BASOPHILS NFR BLD AUTO: 0.3 % — SIGNIFICANT CHANGE UP (ref 0–2)
BUN SERPL-MCNC: 19 MG/DL — SIGNIFICANT CHANGE UP (ref 7–23)
CALCIUM SERPL-MCNC: 8.9 MG/DL — SIGNIFICANT CHANGE UP (ref 8.4–10.5)
CHLORIDE SERPL-SCNC: 98 MMOL/L — SIGNIFICANT CHANGE UP (ref 96–108)
CO2 SERPL-SCNC: 28 MMOL/L — SIGNIFICANT CHANGE UP (ref 22–31)
CREAT SERPL-MCNC: 0.66 MG/DL — SIGNIFICANT CHANGE UP (ref 0.5–1.3)
EOSINOPHIL # BLD AUTO: 0.02 K/UL — SIGNIFICANT CHANGE UP (ref 0–0.5)
EOSINOPHIL NFR BLD AUTO: 0.1 % — SIGNIFICANT CHANGE UP (ref 0–6)
GLUCOSE SERPL-MCNC: 104 MG/DL — HIGH (ref 70–99)
HCT VFR BLD CALC: 40.4 % — SIGNIFICANT CHANGE UP (ref 34.5–45)
HGB BLD-MCNC: 13.1 G/DL — SIGNIFICANT CHANGE UP (ref 11.5–15.5)
IMM GRANULOCYTES NFR BLD AUTO: 4.3 % — HIGH (ref 0–1.5)
LYMPHOCYTES # BLD AUTO: 15.2 % — SIGNIFICANT CHANGE UP (ref 13–44)
LYMPHOCYTES # BLD AUTO: 2.21 K/UL — SIGNIFICANT CHANGE UP (ref 1–3.3)
MCHC RBC-ENTMCNC: 27.8 PG — SIGNIFICANT CHANGE UP (ref 27–34)
MCHC RBC-ENTMCNC: 32.4 GM/DL — SIGNIFICANT CHANGE UP (ref 32–36)
MCV RBC AUTO: 85.8 FL — SIGNIFICANT CHANGE UP (ref 80–100)
MONOCYTES # BLD AUTO: 1.31 K/UL — HIGH (ref 0–0.9)
MONOCYTES NFR BLD AUTO: 9 % — SIGNIFICANT CHANGE UP (ref 2–14)
NEUTROPHILS # BLD AUTO: 10.36 K/UL — HIGH (ref 1.8–7.4)
NEUTROPHILS NFR BLD AUTO: 71.1 % — SIGNIFICANT CHANGE UP (ref 43–77)
NRBC # BLD: 0 /100 WBCS — SIGNIFICANT CHANGE UP (ref 0–0)
PLATELET # BLD AUTO: 373 K/UL — SIGNIFICANT CHANGE UP (ref 150–400)
POTASSIUM SERPL-MCNC: 5.2 MMOL/L — SIGNIFICANT CHANGE UP (ref 3.5–5.3)
POTASSIUM SERPL-SCNC: 5.2 MMOL/L — SIGNIFICANT CHANGE UP (ref 3.5–5.3)
RBC # BLD: 4.71 M/UL — SIGNIFICANT CHANGE UP (ref 3.8–5.2)
RBC # FLD: 13.2 % — SIGNIFICANT CHANGE UP (ref 10.3–14.5)
SODIUM SERPL-SCNC: 136 MMOL/L — SIGNIFICANT CHANGE UP (ref 135–145)
WBC # BLD: 14.58 K/UL — HIGH (ref 3.8–10.5)
WBC # FLD AUTO: 14.58 K/UL — HIGH (ref 3.8–10.5)

## 2020-08-22 PROCEDURE — 99223 1ST HOSP IP/OBS HIGH 75: CPT

## 2020-08-22 RX ORDER — ALBUTEROL 90 UG/1
2 AEROSOL, METERED ORAL EVERY 6 HOURS
Refills: 0 | Status: DISCONTINUED | OUTPATIENT
Start: 2020-08-22 | End: 2020-08-31

## 2020-08-22 RX ADMIN — Medication 2 MILLIGRAM(S): at 21:44

## 2020-08-22 RX ADMIN — Medication 4 MILLIGRAM(S): at 05:05

## 2020-08-22 RX ADMIN — HYDROMORPHONE HYDROCHLORIDE 0.5 MILLIGRAM(S): 2 INJECTION INTRAMUSCULAR; INTRAVENOUS; SUBCUTANEOUS at 21:49

## 2020-08-22 RX ADMIN — Medication 1 TABLET(S): at 13:44

## 2020-08-22 RX ADMIN — OXYCODONE AND ACETAMINOPHEN 2 TABLET(S): 5; 325 TABLET ORAL at 14:05

## 2020-08-22 RX ADMIN — GABAPENTIN 300 MILLIGRAM(S): 400 CAPSULE ORAL at 13:44

## 2020-08-22 RX ADMIN — GABAPENTIN 300 MILLIGRAM(S): 400 CAPSULE ORAL at 05:05

## 2020-08-22 RX ADMIN — HYDROMORPHONE HYDROCHLORIDE 0.5 MILLIGRAM(S): 2 INJECTION INTRAMUSCULAR; INTRAVENOUS; SUBCUTANEOUS at 04:07

## 2020-08-22 RX ADMIN — HYDROMORPHONE HYDROCHLORIDE 0.5 MILLIGRAM(S): 2 INJECTION INTRAMUSCULAR; INTRAVENOUS; SUBCUTANEOUS at 17:22

## 2020-08-22 RX ADMIN — OXYCODONE AND ACETAMINOPHEN 2 TABLET(S): 5; 325 TABLET ORAL at 05:04

## 2020-08-22 RX ADMIN — OXYCODONE AND ACETAMINOPHEN 2 TABLET(S): 5; 325 TABLET ORAL at 22:52

## 2020-08-22 RX ADMIN — HYDROMORPHONE HYDROCHLORIDE 0.5 MILLIGRAM(S): 2 INJECTION INTRAMUSCULAR; INTRAVENOUS; SUBCUTANEOUS at 20:40

## 2020-08-22 RX ADMIN — HYDROMORPHONE HYDROCHLORIDE 0.5 MILLIGRAM(S): 2 INJECTION INTRAMUSCULAR; INTRAVENOUS; SUBCUTANEOUS at 12:45

## 2020-08-22 RX ADMIN — HYDROMORPHONE HYDROCHLORIDE 0.5 MILLIGRAM(S): 2 INJECTION INTRAMUSCULAR; INTRAVENOUS; SUBCUTANEOUS at 17:45

## 2020-08-22 RX ADMIN — GABAPENTIN 300 MILLIGRAM(S): 400 CAPSULE ORAL at 21:45

## 2020-08-22 RX ADMIN — Medication 4 MILLIGRAM(S): at 13:44

## 2020-08-22 RX ADMIN — HYDROMORPHONE HYDROCHLORIDE 0.5 MILLIGRAM(S): 2 INJECTION INTRAMUSCULAR; INTRAVENOUS; SUBCUTANEOUS at 04:30

## 2020-08-22 RX ADMIN — Medication 5 MILLIGRAM(S): at 21:44

## 2020-08-22 RX ADMIN — OXYCODONE AND ACETAMINOPHEN 2 TABLET(S): 5; 325 TABLET ORAL at 23:38

## 2020-08-22 RX ADMIN — OXYCODONE AND ACETAMINOPHEN 2 TABLET(S): 5; 325 TABLET ORAL at 15:05

## 2020-08-22 RX ADMIN — HYDROMORPHONE HYDROCHLORIDE 0.5 MILLIGRAM(S): 2 INJECTION INTRAMUSCULAR; INTRAVENOUS; SUBCUTANEOUS at 08:45

## 2020-08-22 RX ADMIN — HYDROMORPHONE HYDROCHLORIDE 0.5 MILLIGRAM(S): 2 INJECTION INTRAMUSCULAR; INTRAVENOUS; SUBCUTANEOUS at 09:04

## 2020-08-22 RX ADMIN — HYDROMORPHONE HYDROCHLORIDE 0.5 MILLIGRAM(S): 2 INJECTION INTRAMUSCULAR; INTRAVENOUS; SUBCUTANEOUS at 00:40

## 2020-08-22 RX ADMIN — HYDROMORPHONE HYDROCHLORIDE 0.5 MILLIGRAM(S): 2 INJECTION INTRAMUSCULAR; INTRAVENOUS; SUBCUTANEOUS at 01:00

## 2020-08-22 RX ADMIN — Medication 5 MILLIGRAM(S): at 06:38

## 2020-08-22 RX ADMIN — HYDROMORPHONE HYDROCHLORIDE 0.5 MILLIGRAM(S): 2 INJECTION INTRAMUSCULAR; INTRAVENOUS; SUBCUTANEOUS at 12:25

## 2020-08-22 RX ADMIN — SENNA PLUS 2 TABLET(S): 8.6 TABLET ORAL at 21:44

## 2020-08-22 RX ADMIN — PANTOPRAZOLE SODIUM 40 MILLIGRAM(S): 20 TABLET, DELAYED RELEASE ORAL at 05:05

## 2020-08-22 RX ADMIN — ENOXAPARIN SODIUM 40 MILLIGRAM(S): 100 INJECTION SUBCUTANEOUS at 21:44

## 2020-08-22 RX ADMIN — OXYCODONE AND ACETAMINOPHEN 2 TABLET(S): 5; 325 TABLET ORAL at 06:00

## 2020-08-22 NOTE — CONSULT NOTE ADULT - SUBJECTIVE AND OBJECTIVE BOX
Neurology consult    CATALINA CRAWFORD 25y Female     Patient is a 25y old  Female who presents with a chief complaint of worsening LBP pain/right leg weakness. (22 Aug 2020 00:23)      HPI:  24 y/o female pmhx PCOS, asthma, and lumbar HNP 2017 treated w/ conservative management presents to [facility] ED after being discharged early from OSH with continued LBP radiating to the RLE and now to the LLE. Patient was admitted and worked up for cauda equina at [facility] and treated for L5S1 HNP with LICHA, steroids, gabapentin and percocet. Symptoms started July 22, 2020 and she wad admitted to hospitals for 3 weeks. Plan was to go to inpatient rehab however she was not accepted.  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 She first started have symptoms in her right leg and then it "switched" to her left leg. Her symptoms since onset have fluctuated, not significant better or worse. Now her main complaints include back pain,  numbness in her feet, and weakness in b/l legs. Additional complaints include intermittent numbness in RUE    REVIEW OF SYSTEMS:    Constitutional: No fever, chills, fatigue, weakness  Eyes: no eye pain, visual disturbances, or discharge  ENT:  No difficulty hearing, tinnitus, vertigo; No sinus or throat pain  Neck: No pain or stiffness  Respiratory: No cough, dyspnea, wheezing   Cardiovascular: No chest pain, palpitations,   Gastrointestinal: No abdominal or epigastric pain. No nausea, vomiting  No diarrhea or constipation.   Genitourinary: No dysuria, frequency, hematuria or incontinence  Neurological: No headaches, lightheadedness, vertigo, numbness or tremors  Psychiatric: No depression, anxiety, mood swings or difficulty sleeping  Musculoskeletal: No joint pain or swelling; No muscle, back or extremity pain  Skin: No itching, burning, rashes or lesions   Lymph Nodes: No enlarged glands  Endocrine: No heat or cold intolerance; No hair loss, No h/o diabetes or thyroid dysfunction  Allergy and Immunologic: No hives or eczema    MEDICATIONS    acetaminophen   Tablet .. 650 milliGRAM(s) Oral every 6 hours PRN  ALBUTerol    90 MICROgram(s) HFA Inhaler 2 Puff(s) Inhalation every 6 hours PRN  albuterol/ipratropium for Nebulization. 3 milliLiter(s) Nebulizer every 6 hours PRN  bisacodyl 5 milliGRAM(s) Oral at bedtime  BUpivacaine liposome 1.3% Injectable (no eMAR) 20 milliLiter(s) Local Injection once  dexAMETHasone     Tablet   Oral   dexAMETHasone     Tablet 2 milliGRAM(s) Oral every 8 hours  diazepam    Tablet 5 milliGRAM(s) Oral every 8 hours PRN  enoxaparin Injectable 40 milliGRAM(s) SubCutaneous at bedtime  gabapentin 300 milliGRAM(s) Oral three times a day  HYDROmorphone  Injectable 0.5 milliGRAM(s) IV Push every 3 hours PRN  multivitamin 1 Tablet(s) Oral daily  ondansetron Injectable 4 milliGRAM(s) IV Push every 6 hours PRN  oxycodone    5 mG/acetaminophen 325 mG 1 Tablet(s) Oral every 4 hours PRN  oxycodone    5 mG/acetaminophen 325 mG 2 Tablet(s) Oral every 4 hours PRN  pantoprazole    Tablet 40 milliGRAM(s) Oral before breakfast  senna 2 Tablet(s) Oral at bedtime      PMH: Lumbar radiculopathy  PCOS (polycystic ovarian syndrome)  Asthma       PSH: No significant past surgical history      Family history: No history of dementia, strokes, or seizures   FAMILY HISTORY:  No pertinent family history in first degree relatives      SOCIAL HISTORY:  No history of tobacco or alcohol use     Allergies    No Known Allergies    Intolerances            Vital Signs Last 24 Hrs  T(C): 36.9 (22 Aug 2020 16:45), Max: 36.9 (22 Aug 2020 09:01)  T(F): 98.5 (22 Aug 2020 16:45), Max: 98.5 (22 Aug 2020 16:45)  HR: 73 (22 Aug 2020 16:45) (66 - 76)  BP: 95/58 (22 Aug 2020 16:45) (95/58 - 111/77)  BP(mean): --  RR: 17 (22 Aug 2020 16:45) (16 - 17)  SpO2: 95% (22 Aug 2020 16:45) (95% - 97%)      No distress, requesting pain meds  Malar rash on face  Mental Status - Patient is alert, awake, oriented X3. fluent, names, no dysarthria no aphasia Follows commands well and able to answer questions appropriately.  Cranial Nerves - PERRL, EOMI, VFF, V1-V3 intact, no gross facial asymmetry, tongue/uvula midline  motor: b/l UE 5/5, b/l LE at least 4/5 in all muscle groups- able to get good strength very briefly but then limb drops to bed- limited effort, extremities shake with attempt to move  sensory- decreased in b/l LE R>L, foot<distal LE<prox LE and RUE   Coord: FTN intact bilaterally       Reflexes:                                                         	  HEENT:  normocephalic, atraumatic  		  LUNGS:	Clear bilaterally  No Wheeze  Decreased bilaterally  	  HEART:	 Normal S1S2   No murmur RRR        	  GI/ ABDOMEN:  Soft  Non tender    EXTREMITIES:   No Edema  No Clubbing  No Cyanosis No Edema    MUSCULOSKELETAL: Normal Range of Motion  	   SKIN:      Normal   No Ecchymosis               LABS:  CBC Full  -  ( 22 Aug 2020 06:54 )  WBC Count : 14.58 K/uL  RBC Count : 4.71 M/uL  Hemoglobin : 13.1 g/dL  Hematocrit : 40.4 %  Platelet Count - Automated : 373 K/uL  Mean Cell Volume : 85.8 fl  Mean Cell Hemoglobin : 27.8 pg  Mean Cell Hemoglobin Concentration : 32.4 gm/dL  Auto Neutrophil # : 10.36 K/uL  Auto Lymphocyte # : 2.21 K/uL  Auto Monocyte # : 1.31 K/uL  Auto Eosinophil # : 0.02 K/uL  Auto Basophil # : 0.05 K/uL  Auto Neutrophil % : 71.1 %  Auto Lymphocyte % : 15.2 %  Auto Monocyte % : 9.0 %  Auto Eosinophil % : 0.1 %  Auto Basophil % : 0.3 %      08-22    136  |  98  |  19  ----------------------------<  104<H>  5.2   |  28  |  0.66    Ca    8.9      22 Aug 2020 06:54

## 2020-08-22 NOTE — CONSULT NOTE ADULT - ASSESSMENT
25y old  Female who presents with a chief complaint of worsening LBP pain with LE numbness/weakness and intermittent RUE numbness, x 1 month  Neuro-imaging does not explain current symptoms.  Symptoms and timeline not consistent with GBS. Neuro exam was effort limited raising concern for functional component. Given malar rash on face would screen for autoimmune conditions    plan:  Blood work- HbA1c, TSH, Lyme,  B12/folate, MARCOS, ESR, CRP, Double stranded DNA  EMG/NCS would be useful to help differentiate cause of numbness/weakness. Will discuss with Dr. Cisneros possibly for Monday

## 2020-08-22 NOTE — PROGRESS NOTE ADULT - SUBJECTIVE AND OBJECTIVE BOX
S/Overnight events:  No significant events overnight     HPI:  24 y/o female pmhx PCOS, asthma, and lumbar HNP 2017 treated w/ conservative managemetn presents to [facility] ED today after being discharged early from OSH with continued LBP radiating to the RLE and now to the LLE. Patient was admitted and worked up for cauda equina at [facility] and treated for L5S1 HNP with LICHA, steroids, gabapentin and percocet. She was discharged home today but is still unable to walk without assistance. She was scheduled to start outpatient PT but the pain and weakness is worsening. She also reports numbness to the RLE down to the foot now with tingling sensation in the LLE. Denies any recent trauma, falls, denies any recent illness, fever or chills. Denies saddle anesthesia, denies b/b dysfunction.    Hospital Course:  8/15: Patient was admitted and worked up for cauda equina at [facility] and treated for L5-S1 HNP with LICHA, steroids, gabapentin and percocet, transferred here for further workup  8/16: LAZARO overnight. Neuro exam stable.   8/17 LAZARO overnight, Neuro exam stable  8/18: LAZARO overnight. Neuro exam stable. Plan for OR tomorrow   8/19: POD# 0 S/P L5-S1 microdiscectomy. Pt seen and examined at bedside postop. States mild incision site pain. Denies worsening weakness/loss of sensation of extremities.  8/20: LAZARO overnight, neuro stable. Patient c/o LLE weakness and numbness post op overnight   8/21: LAZARO overnight, continued new LLE weakness  8/22: LAZARO overnight. Neuro consulted requested per Dr. Denise for lower extremity weakness/"coldness"    Vital Signs Last 24 Hrs  T(C): 36.8 (21 Aug 2020 20:27), Max: 36.8 (21 Aug 2020 09:12)  T(F): 98.3 (21 Aug 2020 20:27), Max: 98.3 (21 Aug 2020 09:12)  HR: 75 (21 Aug 2020 20:27) (63 - 94)  BP: 101/65 (21 Aug 2020 20:27) (96/62 - 117/65)  BP(mean): --  RR: 16 (21 Aug 2020 20:27) (15 - 17)  SpO2: 97% (21 Aug 2020 20:27) (95% - 97%)    I&O's Detail    20 Aug 2020 07:01  -  21 Aug 2020 07:00  --------------------------------------------------------  IN:    Oral Fluid: 660 mL  Total IN: 660 mL    OUT:    Voided: 2050 mL  Total OUT: 2050 mL    Total NET: -1390 mL      21 Aug 2020 07:01  -  22 Aug 2020 00:24  --------------------------------------------------------  IN:    Oral Fluid: 840 mL  Total IN: 840 mL    OUT:    Voided: 1250 mL  Total OUT: 1250 mL    Total NET: -410 mL        I&O's Summary    20 Aug 2020 07:01  -  21 Aug 2020 07:00  --------------------------------------------------------  IN: 660 mL / OUT: 2050 mL / NET: -1390 mL    21 Aug 2020 07:01  -  22 Aug 2020 00:24  --------------------------------------------------------  IN: 840 mL / OUT: 1250 mL / NET: -410 mL        PHYSICAL EXAM:  Gen: NAD  HEENT: PERRL. EOMI b/l  Neck: Supple   Lungs: CTAB  Heart: S1, S2. RRR  Abd: Soft, NT ND  Exts: 2+ pulses throughout  Neuro: AAO x 3. FC/OE, full 5/5 strength uppers    TUBES/LINES:  [] CVC  [] A-line  [] Lumbar Drain  [] Ventriculostomy  [] Other    DIET:  [] NPO  [] Mechanical  [] Tube feeds    LABS:                        13.1   17.71 )-----------( 380      ( 21 Aug 2020 07:06 )             39.6     08-21    137  |  100  |  22  ----------------------------<  99  4.8   |  26  |  0.61    Ca    8.8      21 Aug 2020 07:06              CAPILLARY BLOOD GLUCOSE          Drug Levels: [] N/A    CSF Analysis: [] N/A      Allergies    No Known Allergies    Intolerances      MEDICATIONS:  Antibiotics:    Neuro:  acetaminophen   Tablet .. 650 milliGRAM(s) Oral every 6 hours PRN  diazepam    Tablet 5 milliGRAM(s) Oral every 8 hours PRN  gabapentin 300 milliGRAM(s) Oral three times a day  HYDROmorphone  Injectable 0.5 milliGRAM(s) IV Push every 3 hours PRN  ondansetron Injectable 4 milliGRAM(s) IV Push every 6 hours PRN  oxycodone    5 mG/acetaminophen 325 mG 1 Tablet(s) Oral every 4 hours PRN  oxycodone    5 mG/acetaminophen 325 mG 2 Tablet(s) Oral every 4 hours PRN    Anticoagulation:  enoxaparin Injectable 40 milliGRAM(s) SubCutaneous at bedtime    OTHER:  albuterol/ipratropium for Nebulization. 3 milliLiter(s) Nebulizer every 6 hours PRN  bisacodyl 5 milliGRAM(s) Oral at bedtime  BUpivacaine liposome 1.3% Injectable (no eMAR) 20 milliLiter(s) Local Injection once  dexAMETHasone     Tablet   Oral   dexAMETHasone     Tablet 4 milliGRAM(s) Oral every 8 hours  dexAMETHasone     Tablet 2 milliGRAM(s) Oral every 8 hours  pantoprazole    Tablet 40 milliGRAM(s) Oral before breakfast  senna 2 Tablet(s) Oral at bedtime    IVF:  multivitamin 1 Tablet(s) Oral daily    CULTURES:    RADIOLOGY & ADDITIONAL TESTS:      ASSESSMENT:  25y Female s/p    LUMBAR RADICULOPATHY  No pertinent family history in first degree relatives  Handoff  MEWS Score  Lumbar radiculopathy  PCOS (polycystic ovarian syndrome)  Asthma  Lumbar radiculopathy  PCOS (polycystic ovarian syndrome)  Asthma  Cauda equina syndrome  Lumbar herniated disc  Cauda equina syndrome  Lumbar herniated disc  Lumbar radiculopathy  Other elevated white blood cell (WBC) count  Postoperative state  Preoperative clearance  Mild intermittent asthma without complication  Lumbar radiculopathy  Discectomy, spine, lumbar, 1 level, minimally invasive  No significant past surgical history  BACK PAIN  Lower extremity weakness  [email or web address]      PLAN:  NEURO:    CARDIOVASCULAR:    PULMONARY:    RENAL:    GI:    ID:    ENDO:    DVT PROPHYLAXIS:    DISPOSITION:       Assessment:  Present when checked    []  GCS  E   V  M     Heart Failure: []Acute, [] acute on chronic , []chronic  Heart Failure:  [] Diastolic (HFpEF), [] Systolic (HFrEF), []Combined (HFpEF and HFrEF), [] RHF, [] Pulm HTN, [] Other    [] ANDREA, [] ATN, [] AIN, [] other  [] CKD1, [] CKD2, [] CKD 3, [] CKD 4, [] CKD 5, []ESRD    Encephalopathy: [] Metabolic, [] Hepatic, [] toxic, [] Neurological, [] Other    Abnormal Nurtitional Status: [] malnurtition (see nutrition note), [ ]underweight: BMI < 19, [] morbid obesity: BMI >40, [] Cachexia    [] Sepsis  [] hypovolemic shock,[] cardiogenic shock, [] hemorrhagic shock, [] neuogenic shock  [] Acute Respiratory Failure  []Cerebral edema, [] Brain compression/ herniation,   [] Functional quadriplegia  [] Acute blood loss anemia S/Overnight events:  No significant events overnight     HPI:  26 y/o female pmhx PCOS, asthma, and lumbar HNP 2017 treated w/ conservative managemetn presents to [facility] ED today after being discharged early from OSH with continued LBP radiating to the RLE and now to the LLE. Patient was admitted and worked up for cauda equina at [facility] and treated for L5S1 HNP with LICHA, steroids, gabapentin and percocet. She was discharged home today but is still unable to walk without assistance. She was scheduled to start outpatient PT but the pain and weakness is worsening. She also reports numbness to the RLE down to the foot now with tingling sensation in the LLE. Denies any recent trauma, falls, denies any recent illness, fever or chills. Denies saddle anesthesia, denies b/b dysfunction.    Hospital Course:  8/15: Patient was admitted and worked up for cauda equina at [facility] and treated for L5-S1 HNP with LICHA, steroids, gabapentin and percocet, transferred here for further workup  8/16: LAZARO overnight. Neuro exam stable.   8/17 LAZARO overnight, Neuro exam stable  8/18: LAZARO overnight. Neuro exam stable. Plan for OR tomorrow   8/19: POD# 0 S/P L5-S1 microdiscectomy. Pt seen and examined at bedside postop. States mild incision site pain. Denies worsening weakness/loss of sensation of extremities.  8/20: LAZARO overnight, neuro stable. Patient c/o LLE weakness and numbness post op overnight   8/21: LAZARO overnight, continued new LLE weakness  8/22: LAZARO overnight. Neuro consulted requested per Dr. Denise for lower extremity weakness/"coldness"    Vital Signs Last 24 Hrs  T(C): 36.8 (21 Aug 2020 20:27), Max: 36.8 (21 Aug 2020 09:12)  T(F): 98.3 (21 Aug 2020 20:27), Max: 98.3 (21 Aug 2020 09:12)  HR: 75 (21 Aug 2020 20:27) (63 - 94)  BP: 101/65 (21 Aug 2020 20:27) (96/62 - 117/65)  BP(mean): --  RR: 16 (21 Aug 2020 20:27) (15 - 17)  SpO2: 97% (21 Aug 2020 20:27) (95% - 97%)    I&O's Detail    20 Aug 2020 07:01  -  21 Aug 2020 07:00  --------------------------------------------------------  IN:    Oral Fluid: 660 mL  Total IN: 660 mL    OUT:    Voided: 2050 mL  Total OUT: 2050 mL    Total NET: -1390 mL      21 Aug 2020 07:01  -  22 Aug 2020 00:24  --------------------------------------------------------  IN:    Oral Fluid: 840 mL  Total IN: 840 mL    OUT:    Voided: 1250 mL  Total OUT: 1250 mL    Total NET: -410 mL        I&O's Summary    20 Aug 2020 07:01  -  21 Aug 2020 07:00  --------------------------------------------------------  IN: 660 mL / OUT: 2050 mL / NET: -1390 mL    21 Aug 2020 07:01  -  22 Aug 2020 00:24  --------------------------------------------------------  IN: 840 mL / OUT: 1250 mL / NET: -410 mL        PHYSICAL EXAM:  Gen: NAD  HEENT: PERRL. EOMI b/l  Neck: Supple   Lungs: CTAB  Heart: S1, S2. RRR  Abd: Soft, NT ND  Exts: 2+ pulses throughout  Neuro: AAO x 3. FC/OE, full 5/5 strength uppers. LLE - HF 3+/5, knee flex 3+/5, DF PF 4/5. RLE - 2+/5, knee flex 2+/5, DF PF 4/5    TUBES/LINES:  [] CVC  [] A-line  [] Lumbar Drain  [] Ventriculostomy  [] Other    DIET:  [] NPO  [] Mechanical  [] Tube feeds    LABS:                        13.1   17.71 )-----------( 380      ( 21 Aug 2020 07:06 )             39.6     08-21    137  |  100  |  22  ----------------------------<  99  4.8   |  26  |  0.61    Ca    8.8      21 Aug 2020 07:06              CAPILLARY BLOOD GLUCOSE          Drug Levels: [] N/A    CSF Analysis: [] N/A      Allergies    No Known Allergies    Intolerances      MEDICATIONS:  Antibiotics:    Neuro:  acetaminophen   Tablet .. 650 milliGRAM(s) Oral every 6 hours PRN  diazepam    Tablet 5 milliGRAM(s) Oral every 8 hours PRN  gabapentin 300 milliGRAM(s) Oral three times a day  HYDROmorphone  Injectable 0.5 milliGRAM(s) IV Push every 3 hours PRN  ondansetron Injectable 4 milliGRAM(s) IV Push every 6 hours PRN  oxycodone    5 mG/acetaminophen 325 mG 1 Tablet(s) Oral every 4 hours PRN  oxycodone    5 mG/acetaminophen 325 mG 2 Tablet(s) Oral every 4 hours PRN    Anticoagulation:  enoxaparin Injectable 40 milliGRAM(s) SubCutaneous at bedtime    OTHER:  albuterol/ipratropium for Nebulization. 3 milliLiter(s) Nebulizer every 6 hours PRN  bisacodyl 5 milliGRAM(s) Oral at bedtime  BUpivacaine liposome 1.3% Injectable (no eMAR) 20 milliLiter(s) Local Injection once  dexAMETHasone     Tablet   Oral   dexAMETHasone     Tablet 4 milliGRAM(s) Oral every 8 hours  dexAMETHasone     Tablet 2 milliGRAM(s) Oral every 8 hours  pantoprazole    Tablet 40 milliGRAM(s) Oral before breakfast  senna 2 Tablet(s) Oral at bedtime    IVF:  multivitamin 1 Tablet(s) Oral daily    CULTURES:    RADIOLOGY & ADDITIONAL TESTS:      ASSESSMENT:  25y Female POD 3 L5-S1 lami disectomy     LUMBAR RADICULOPATHY  No pertinent family history in first degree relatives  Handoff  MEWS Score  Lumbar radiculopathy  PCOS (polycystic ovarian syndrome)  Asthma  Lumbar radiculopathy  PCOS (polycystic ovarian syndrome)  Asthma  Cauda equina syndrome  Lumbar herniated disc  Cauda equina syndrome  Lumbar herniated disc  Lumbar radiculopathy  Other elevated white blood cell (WBC) count  Postoperative state  Preoperative clearance  Mild intermittent asthma without complication  Lumbar radiculopathy  Discectomy, spine, lumbar, 1 level, minimally invasive  No significant past surgical history  BACK PAIN  Lower extremity weakness  [email or web address]      PLAN:  Neuro checks  Vital checks  pain control prn  normotensive BP goals  Bowel regimen  PPI for GI ppx    Neurology consults for LLE weakness/coldness  Plan d/w Dr. Denise       Assessment:  Present when checked    []  GCS  E   V  M     Heart Failure: []Acute, [] acute on chronic , []chronic  Heart Failure:  [] Diastolic (HFpEF), [] Systolic (HFrEF), []Combined (HFpEF and HFrEF), [] RHF, [] Pulm HTN, [] Other    [] ANDREA, [] ATN, [] AIN, [] other  [] CKD1, [] CKD2, [] CKD 3, [] CKD 4, [] CKD 5, []ESRD    Encephalopathy: [] Metabolic, [] Hepatic, [] toxic, [] Neurological, [] Other    Abnormal Nurtitional Status: [] malnurtition (see nutrition note), [ ]underweight: BMI < 19, [] morbid obesity: BMI >40, [] Cachexia    [] Sepsis  [] hypovolemic shock,[] cardiogenic shock, [] hemorrhagic shock, [] neuogenic shock  [] Acute Respiratory Failure  []Cerebral edema, [] Brain compression/ herniation,   [] Functional quadriplegia  [] Acute blood loss anemia S/Overnight events:  No significant events overnight     HPI:  26 y/o female pmhx PCOS, asthma, and lumbar HNP 2017 treated w/ conservative managemetn presents to [facility] ED today after being discharged early from OSH with continued LBP radiating to the RLE and now to the LLE. Patient was admitted and worked up for cauda equina at [facility] and treated for L5S1 HNP with LICHA, steroids, gabapentin and percocet. She was discharged home today but is still unable to walk without assistance. She was scheduled to start outpatient PT but the pain and weakness is worsening. She also reports numbness to the RLE down to the foot now with tingling sensation in the LLE. Denies any recent trauma, falls, denies any recent illness, fever or chills. Denies saddle anesthesia, denies b/b dysfunction.    Hospital Course:  8/15: Patient was admitted and worked up for cauda equina at [facility] and treated for L5-S1 HNP with LICHA, steroids, gabapentin and percocet, transferred here for further workup  8/16: LAZARO overnight. Neuro exam stable.   8/17 LAZARO overnight, Neuro exam stable  8/18: LAZARO overnight. Neuro exam stable. Plan for OR tomorrow   8/19: POD# 0 S/P L5-S1 microdiscectomy. Pt seen and examined at bedside postop. States mild incision site pain. Denies worsening weakness/loss of sensation of extremities.  8/20: LAZARO overnight, neuro stable. Patient c/o LLE weakness and numbness post op overnight   8/21: LAZARO overnight, continued new LLE weakness  8/22: LAZARO overnight. Neuro consulted requested per Dr. Denise for lower extremity weakness/"coldness"    Vital Signs Last 24 Hrs  T(C): 36.8 (21 Aug 2020 20:27), Max: 36.8 (21 Aug 2020 09:12)  T(F): 98.3 (21 Aug 2020 20:27), Max: 98.3 (21 Aug 2020 09:12)  HR: 75 (21 Aug 2020 20:27) (63 - 94)  BP: 101/65 (21 Aug 2020 20:27) (96/62 - 117/65)  BP(mean): --  RR: 16 (21 Aug 2020 20:27) (15 - 17)  SpO2: 97% (21 Aug 2020 20:27) (95% - 97%)    I&O's Detail    20 Aug 2020 07:01  -  21 Aug 2020 07:00  --------------------------------------------------------  IN:    Oral Fluid: 660 mL  Total IN: 660 mL    OUT:    Voided: 2050 mL  Total OUT: 2050 mL    Total NET: -1390 mL      21 Aug 2020 07:01  -  22 Aug 2020 00:24  --------------------------------------------------------  IN:    Oral Fluid: 840 mL  Total IN: 840 mL    OUT:    Voided: 1250 mL  Total OUT: 1250 mL    Total NET: -410 mL        I&O's Summary    20 Aug 2020 07:01  -  21 Aug 2020 07:00  --------------------------------------------------------  IN: 660 mL / OUT: 2050 mL / NET: -1390 mL    21 Aug 2020 07:01  -  22 Aug 2020 00:24  --------------------------------------------------------  IN: 840 mL / OUT: 1250 mL / NET: -410 mL        PHYSICAL EXAM:  Gen: NAD  HEENT: PERRL. EOMI b/l  Neck: Supple   Lungs: CTAB  Heart: S1, S2. RRR  Abd: Soft, NT ND  Exts: 2+ pulses throughout  Neuro: AAO x 3. FC/OE, full 5/5 strength uppers. LLE - HF 3+/5, knee flex 3+/5, DF PF 4/5. RLE - 2+/5, knee flex 2+/5, DF PF 4/5    TUBES/LINES:  [] CVC  [] A-line  [] Lumbar Drain  [] Ventriculostomy  [] Other    DIET:  [] NPO  [] Mechanical  [] Tube feeds    LABS:                        13.1   17.71 )-----------( 380      ( 21 Aug 2020 07:06 )             39.6     08-21    137  |  100  |  22  ----------------------------<  99  4.8   |  26  |  0.61    Ca    8.8      21 Aug 2020 07:06              CAPILLARY BLOOD GLUCOSE          Drug Levels: [] N/A    CSF Analysis: [] N/A      Allergies    No Known Allergies    Intolerances      MEDICATIONS:  Antibiotics:    Neuro:  acetaminophen   Tablet .. 650 milliGRAM(s) Oral every 6 hours PRN  diazepam    Tablet 5 milliGRAM(s) Oral every 8 hours PRN  gabapentin 300 milliGRAM(s) Oral three times a day  HYDROmorphone  Injectable 0.5 milliGRAM(s) IV Push every 3 hours PRN  ondansetron Injectable 4 milliGRAM(s) IV Push every 6 hours PRN  oxycodone    5 mG/acetaminophen 325 mG 1 Tablet(s) Oral every 4 hours PRN  oxycodone    5 mG/acetaminophen 325 mG 2 Tablet(s) Oral every 4 hours PRN    Anticoagulation:  enoxaparin Injectable 40 milliGRAM(s) SubCutaneous at bedtime    OTHER:  albuterol/ipratropium for Nebulization. 3 milliLiter(s) Nebulizer every 6 hours PRN  bisacodyl 5 milliGRAM(s) Oral at bedtime  BUpivacaine liposome 1.3% Injectable (no eMAR) 20 milliLiter(s) Local Injection once  dexAMETHasone     Tablet   Oral   dexAMETHasone     Tablet 4 milliGRAM(s) Oral every 8 hours  dexAMETHasone     Tablet 2 milliGRAM(s) Oral every 8 hours  pantoprazole    Tablet 40 milliGRAM(s) Oral before breakfast  senna 2 Tablet(s) Oral at bedtime    IVF:  multivitamin 1 Tablet(s) Oral daily    CULTURES:    RADIOLOGY & ADDITIONAL TESTS:      ASSESSMENT:  25y Female POD 3 L5-S1 lami disectomy     LUMBAR RADICULOPATHY  No pertinent family history in first degree relatives  Handoff  MEWS Score  Lumbar radiculopathy  PCOS (polycystic ovarian syndrome)  Asthma  Lumbar radiculopathy  PCOS (polycystic ovarian syndrome)  Asthma  Cauda equina syndrome  Lumbar herniated disc  Cauda equina syndrome  Lumbar herniated disc  Lumbar radiculopathy  Other elevated white blood cell (WBC) count  Postoperative state  Preoperative clearance  Mild intermittent asthma without complication  Lumbar radiculopathy  Discectomy, spine, lumbar, 1 level, minimally invasive  No significant past surgical history  BACK PAIN  Lower extremity weakness  [email or web address]      PLAN:  Neuro checks  Vital checks  pain control prn  normotensive BP goals  Bowel regimen  PPI for GI ppx    Neurology consults for LLE weakness/coldness  PT and acute rehab  Plan d/w Dr. Denise       Assessment:  Present when checked    []  GCS  E   V  M     Heart Failure: []Acute, [] acute on chronic , []chronic  Heart Failure:  [] Diastolic (HFpEF), [] Systolic (HFrEF), []Combined (HFpEF and HFrEF), [] RHF, [] Pulm HTN, [] Other    [] ANDREA, [] ATN, [] AIN, [] other  [] CKD1, [] CKD2, [] CKD 3, [] CKD 4, [] CKD 5, []ESRD    Encephalopathy: [] Metabolic, [] Hepatic, [] toxic, [] Neurological, [] Other    Abnormal Nurtitional Status: [] malnurtition (see nutrition note), [ ]underweight: BMI < 19, [] morbid obesity: BMI >40, [] Cachexia    [] Sepsis  [] hypovolemic shock,[] cardiogenic shock, [] hemorrhagic shock, [] neuogenic shock  [] Acute Respiratory Failure  []Cerebral edema, [] Brain compression/ herniation,   [] Functional quadriplegia  [] Acute blood loss anemia

## 2020-08-23 LAB
A1C WITH ESTIMATED AVERAGE GLUCOSE RESULT: 5.8 % — HIGH (ref 4–5.6)
ANION GAP SERPL CALC-SCNC: 8 MMOL/L — SIGNIFICANT CHANGE UP (ref 5–17)
BUN SERPL-MCNC: 24 MG/DL — HIGH (ref 7–23)
CALCIUM SERPL-MCNC: 9.1 MG/DL — SIGNIFICANT CHANGE UP (ref 8.4–10.5)
CHLORIDE SERPL-SCNC: 100 MMOL/L — SIGNIFICANT CHANGE UP (ref 96–108)
CO2 SERPL-SCNC: 28 MMOL/L — SIGNIFICANT CHANGE UP (ref 22–31)
CREAT SERPL-MCNC: 0.62 MG/DL — SIGNIFICANT CHANGE UP (ref 0.5–1.3)
CRP SERPL-MCNC: 0.04 MG/DL — SIGNIFICANT CHANGE UP (ref 0–0.4)
ESTIMATED AVERAGE GLUCOSE: 120 MG/DL — HIGH (ref 68–114)
FOLATE SERPL-MCNC: 7.4 NG/ML — SIGNIFICANT CHANGE UP
GLUCOSE SERPL-MCNC: 95 MG/DL — SIGNIFICANT CHANGE UP (ref 70–99)
HCT VFR BLD CALC: 41.2 % — SIGNIFICANT CHANGE UP (ref 34.5–45)
HGB BLD-MCNC: 13.4 G/DL — SIGNIFICANT CHANGE UP (ref 11.5–15.5)
MCHC RBC-ENTMCNC: 27.7 PG — SIGNIFICANT CHANGE UP (ref 27–34)
MCHC RBC-ENTMCNC: 32.5 GM/DL — SIGNIFICANT CHANGE UP (ref 32–36)
MCV RBC AUTO: 85.1 FL — SIGNIFICANT CHANGE UP (ref 80–100)
NRBC # BLD: 0 /100 WBCS — SIGNIFICANT CHANGE UP (ref 0–0)
PLATELET # BLD AUTO: 350 K/UL — SIGNIFICANT CHANGE UP (ref 150–400)
POTASSIUM SERPL-MCNC: 4.7 MMOL/L — SIGNIFICANT CHANGE UP (ref 3.5–5.3)
POTASSIUM SERPL-SCNC: 4.7 MMOL/L — SIGNIFICANT CHANGE UP (ref 3.5–5.3)
RBC # BLD: 4.84 M/UL — SIGNIFICANT CHANGE UP (ref 3.8–5.2)
RBC # FLD: 13.3 % — SIGNIFICANT CHANGE UP (ref 10.3–14.5)
SODIUM SERPL-SCNC: 136 MMOL/L — SIGNIFICANT CHANGE UP (ref 135–145)
TSH SERPL-MCNC: 0.28 UIU/ML — LOW (ref 0.35–4.94)
VIT B12 SERPL-MCNC: 733 PG/ML — SIGNIFICANT CHANGE UP (ref 232–1245)
WBC # BLD: 15.69 K/UL — HIGH (ref 3.8–10.5)
WBC # FLD AUTO: 15.69 K/UL — HIGH (ref 3.8–10.5)

## 2020-08-23 PROCEDURE — 99232 SBSQ HOSP IP/OBS MODERATE 35: CPT

## 2020-08-23 RX ORDER — LACTULOSE 10 G/15ML
20 SOLUTION ORAL ONCE
Refills: 0 | Status: COMPLETED | OUTPATIENT
Start: 2020-08-23 | End: 2020-08-23

## 2020-08-23 RX ADMIN — OXYCODONE AND ACETAMINOPHEN 2 TABLET(S): 5; 325 TABLET ORAL at 04:52

## 2020-08-23 RX ADMIN — OXYCODONE AND ACETAMINOPHEN 2 TABLET(S): 5; 325 TABLET ORAL at 03:51

## 2020-08-23 RX ADMIN — ENOXAPARIN SODIUM 40 MILLIGRAM(S): 100 INJECTION SUBCUTANEOUS at 21:27

## 2020-08-23 RX ADMIN — HYDROMORPHONE HYDROCHLORIDE 0.5 MILLIGRAM(S): 2 INJECTION INTRAMUSCULAR; INTRAVENOUS; SUBCUTANEOUS at 00:56

## 2020-08-23 RX ADMIN — Medication 2 MILLIGRAM(S): at 06:00

## 2020-08-23 RX ADMIN — HYDROMORPHONE HYDROCHLORIDE 0.5 MILLIGRAM(S): 2 INJECTION INTRAMUSCULAR; INTRAVENOUS; SUBCUTANEOUS at 20:35

## 2020-08-23 RX ADMIN — Medication 2 MILLIGRAM(S): at 13:18

## 2020-08-23 RX ADMIN — HYDROMORPHONE HYDROCHLORIDE 0.5 MILLIGRAM(S): 2 INJECTION INTRAMUSCULAR; INTRAVENOUS; SUBCUTANEOUS at 10:15

## 2020-08-23 RX ADMIN — HYDROMORPHONE HYDROCHLORIDE 0.5 MILLIGRAM(S): 2 INJECTION INTRAMUSCULAR; INTRAVENOUS; SUBCUTANEOUS at 20:18

## 2020-08-23 RX ADMIN — HYDROMORPHONE HYDROCHLORIDE 0.5 MILLIGRAM(S): 2 INJECTION INTRAMUSCULAR; INTRAVENOUS; SUBCUTANEOUS at 01:38

## 2020-08-23 RX ADMIN — Medication 5 MILLIGRAM(S): at 21:28

## 2020-08-23 RX ADMIN — PANTOPRAZOLE SODIUM 40 MILLIGRAM(S): 20 TABLET, DELAYED RELEASE ORAL at 06:00

## 2020-08-23 RX ADMIN — Medication 1 TABLET(S): at 11:17

## 2020-08-23 RX ADMIN — SENNA PLUS 2 TABLET(S): 8.6 TABLET ORAL at 21:27

## 2020-08-23 RX ADMIN — HYDROMORPHONE HYDROCHLORIDE 0.5 MILLIGRAM(S): 2 INJECTION INTRAMUSCULAR; INTRAVENOUS; SUBCUTANEOUS at 09:56

## 2020-08-23 RX ADMIN — ONDANSETRON 4 MILLIGRAM(S): 8 TABLET, FILM COATED ORAL at 14:22

## 2020-08-23 RX ADMIN — OXYCODONE AND ACETAMINOPHEN 2 TABLET(S): 5; 325 TABLET ORAL at 23:18

## 2020-08-23 RX ADMIN — GABAPENTIN 300 MILLIGRAM(S): 400 CAPSULE ORAL at 21:27

## 2020-08-23 RX ADMIN — GABAPENTIN 300 MILLIGRAM(S): 400 CAPSULE ORAL at 13:19

## 2020-08-23 RX ADMIN — OXYCODONE AND ACETAMINOPHEN 2 TABLET(S): 5; 325 TABLET ORAL at 11:16

## 2020-08-23 RX ADMIN — OXYCODONE AND ACETAMINOPHEN 2 TABLET(S): 5; 325 TABLET ORAL at 11:30

## 2020-08-23 RX ADMIN — LACTULOSE 20 GRAM(S): 10 SOLUTION ORAL at 13:19

## 2020-08-23 RX ADMIN — Medication 2 MILLIGRAM(S): at 21:28

## 2020-08-23 RX ADMIN — GABAPENTIN 300 MILLIGRAM(S): 400 CAPSULE ORAL at 06:00

## 2020-08-23 RX ADMIN — HYDROMORPHONE HYDROCHLORIDE 0.5 MILLIGRAM(S): 2 INJECTION INTRAMUSCULAR; INTRAVENOUS; SUBCUTANEOUS at 06:22

## 2020-08-23 RX ADMIN — HYDROMORPHONE HYDROCHLORIDE 0.5 MILLIGRAM(S): 2 INJECTION INTRAMUSCULAR; INTRAVENOUS; SUBCUTANEOUS at 06:00

## 2020-08-23 NOTE — PROGRESS NOTE ADULT - SUBJECTIVE AND OBJECTIVE BOX
Subjective  No events overnight.  Patient reports pain is currently controlled. Patient reports that she walked with walker duing PT    ROS  As above, otherwise negative for constitutional/HEENT/CV/pulm/GI//MSK/neuro/derm/endocrine/psych.     MEDICATIONS  (STANDING):  bisacodyl 5 milliGRAM(s) Oral at bedtime  BUpivacaine liposome 1.3% Injectable (no eMAR) 20 milliLiter(s) Local Injection once  dexAMETHasone     Tablet   Oral   dexAMETHasone     Tablet 2 milliGRAM(s) Oral every 8 hours  enoxaparin Injectable 40 milliGRAM(s) SubCutaneous at bedtime  gabapentin 300 milliGRAM(s) Oral three times a day  multivitamin 1 Tablet(s) Oral daily  pantoprazole    Tablet 40 milliGRAM(s) Oral before breakfast  senna 2 Tablet(s) Oral at bedtime    MEDICATIONS  (PRN):  acetaminophen   Tablet .. 650 milliGRAM(s) Oral every 6 hours PRN Temp greater or equal to 38C (100.4F), Mild Pain (1 - 3)  ALBUTerol    90 MICROgram(s) HFA Inhaler 2 Puff(s) Inhalation every 6 hours PRN Shortness of Breath and/or Wheezing  albuterol/ipratropium for Nebulization. 3 milliLiter(s) Nebulizer every 6 hours PRN Shortness of Breath and/or Wheezing  diazepam    Tablet 5 milliGRAM(s) Oral every 8 hours PRN muscle spasm  HYDROmorphone  Injectable 0.5 milliGRAM(s) IV Push every 3 hours PRN breakthrough pain  ondansetron Injectable 4 milliGRAM(s) IV Push every 6 hours PRN Nausea  oxycodone    5 mG/acetaminophen 325 mG 1 Tablet(s) Oral every 4 hours PRN Moderate Pain (4 - 6)  oxycodone    5 mG/acetaminophen 325 mG 2 Tablet(s) Oral every 4 hours PRN Severe Pain (7 - 10)      T(C): 36.9 (08-23-20 @ 16:47), Max: 37 (08-23-20 @ 08:05)  HR: 74 (08-23-20 @ 16:47) (59 - 74)  BP: 110/69 (08-23-20 @ 16:47) (92/52 - 110/69)  RR: 17 (08-23-20 @ 16:47) (17 - 17)  SpO2: 96% (08-23-20 @ 16:47) (95% - 96%)  Wt(kg): --    Eyes open spontaneously. Conversational with appropriate sentences.  EOMI. Visual fields full. PERRL 3>2. Face symmetrical.  BL UE 5/5, BL LE at least 4+/5 in all muscle groups- again effort limited.   normal tone and bulk.  DTR 2+ throughout except left achiles 1+, no clonus  sensory- decreased in left>right (opposite previous day)  vibration diminished in distal LE, proprioception intact   Finger-nose-finger intact R/L.      CBC Full  -  ( 23 Aug 2020 07:28 )  WBC Count : 15.69 K/uL  RBC Count : 4.84 M/uL  Hemoglobin : 13.4 g/dL  Hematocrit : 41.2 %  Platelet Count - Automated : 350 K/uL  Mean Cell Volume : 85.1 fl  Mean Cell Hemoglobin : 27.7 pg  Mean Cell Hemoglobin Concentration : 32.5 gm/dL  Auto Neutrophil # : x  Auto Lymphocyte # : x  Auto Monocyte # : x  Auto Eosinophil # : x  Auto Basophil # : x  Auto Neutrophil % : x  Auto Lymphocyte % : x  Auto Monocyte % : x  Auto Eosinophil % : x  Auto Basophil % : x    08-23    136  |  100  |  24<H>  ----------------------------<  95  4.7   |  28  |  0.62    Ca    9.1      23 Aug 2020 07:28

## 2020-08-23 NOTE — PROGRESS NOTE ADULT - ASSESSMENT
25y old  Female who presents with a chief complaint of worsening LBP pain with LE numbness/weakness and intermittent RUE numbness, x 1 month  Neuro-imaging does not explain current symptoms.  Symptoms and timeline not consistent with GBS. Neuro exam effort limited and variable raising and  concern for functional component.   Given malar rash on face would screen for rheumatologic/autoimmune conditions    plan:  Blood work- F/u , Lyme,  B12/folate, MARCOS, Double stranded DNA, obtain T4 given low TSH  EMG/NCS would be useful to help differentiate cause of numbness/weakness. Will discuss with Dr. Cisneros possibly for Monday 25y old  Female who presents with a chief complaint of worsening LBP pain with LE numbness/weakness and intermittent RUE numbness, x 1 month  Neuro-imaging does not explain current symptoms.  Symptoms and timeline not consistent with GBS. Neuro exam effort limited and variable raising concern for functional component.   Given malar rash on face would screen for rheumatologic/autoimmune conditions    plan:  Blood work- F/u , Lyme,  B12/folate, MARCOS, Double stranded DNA, obtain T4 given low TSH  EMG/NCS would be useful to help differentiate cause of numbness/weakness. Will discuss with Dr. Cisneros possibly for Monday

## 2020-08-23 NOTE — PROGRESS NOTE ADULT - SUBJECTIVE AND OBJECTIVE BOX
S/Overnight events:        Hospital Course:   POD#       Vital Signs Last 24 Hrs  T(C): 36.7 (22 Aug 2020 20:35), Max: 36.9 (22 Aug 2020 09:01)  T(F): 98.1 (22 Aug 2020 20:35), Max: 98.5 (22 Aug 2020 16:45)  HR: 66 (22 Aug 2020 20:35) (66 - 76)  BP: 109/70 (22 Aug 2020 20:35) (95/58 - 111/77)  BP(mean): --  RR: 17 (22 Aug 2020 20:35) (16 - 17)  SpO2: 95% (22 Aug 2020 20:35) (95% - 97%)    I&O's Detail    21 Aug 2020 07:01  -  22 Aug 2020 07:00  --------------------------------------------------------  IN:    Oral Fluid: 840 mL  Total IN: 840 mL    OUT:    Voided: 2250 mL  Total OUT: 2250 mL    Total NET: -1410 mL      22 Aug 2020 07:01  -  23 Aug 2020 01:10  --------------------------------------------------------  IN:    Oral Fluid: 960 mL  Total IN: 960 mL    OUT:    Voided: 1100 mL  Total OUT: 1100 mL    Total NET: -140 mL        I&O's Summary    21 Aug 2020 07:01  -  22 Aug 2020 07:00  --------------------------------------------------------  IN: 840 mL / OUT: 2250 mL / NET: -1410 mL    22 Aug 2020 07:01  -  23 Aug 2020 01:10  --------------------------------------------------------  IN: 960 mL / OUT: 1100 mL / NET: -140 mL        PHYSICAL EXAM:  Gen:  HEENT:  Neck:  Lungs:  Heart:  Abd:  Exts:  Neuro:    TUBES/LINES:  [] CVC  [] A-line  [] Lumbar Drain  [] Ventriculostomy  [] Other    DIET:  [] NPO  [] Mechanical  [] Tube feeds    LABS:                        13.1   14.58 )-----------( 373      ( 22 Aug 2020 06:54 )             40.4     08-22    136  |  98  |  19  ----------------------------<  104<H>  5.2   |  28  |  0.66    Ca    8.9      22 Aug 2020 06:54              CAPILLARY BLOOD GLUCOSE          Drug Levels: [] N/A    CSF Analysis: [] N/A      Allergies    No Known Allergies    Intolerances      MEDICATIONS:  Antibiotics:    Neuro:  acetaminophen   Tablet .. 650 milliGRAM(s) Oral every 6 hours PRN  diazepam    Tablet 5 milliGRAM(s) Oral every 8 hours PRN  gabapentin 300 milliGRAM(s) Oral three times a day  HYDROmorphone  Injectable 0.5 milliGRAM(s) IV Push every 3 hours PRN  ondansetron Injectable 4 milliGRAM(s) IV Push every 6 hours PRN  oxycodone    5 mG/acetaminophen 325 mG 1 Tablet(s) Oral every 4 hours PRN  oxycodone    5 mG/acetaminophen 325 mG 2 Tablet(s) Oral every 4 hours PRN    Anticoagulation:  enoxaparin Injectable 40 milliGRAM(s) SubCutaneous at bedtime    OTHER:  ALBUTerol    90 MICROgram(s) HFA Inhaler 2 Puff(s) Inhalation every 6 hours PRN  albuterol/ipratropium for Nebulization. 3 milliLiter(s) Nebulizer every 6 hours PRN  bisacodyl 5 milliGRAM(s) Oral at bedtime  BUpivacaine liposome 1.3% Injectable (no eMAR) 20 milliLiter(s) Local Injection once  dexAMETHasone     Tablet   Oral   dexAMETHasone     Tablet 2 milliGRAM(s) Oral every 8 hours  pantoprazole    Tablet 40 milliGRAM(s) Oral before breakfast  senna 2 Tablet(s) Oral at bedtime    IVF:  multivitamin 1 Tablet(s) Oral daily    CULTURES:    RADIOLOGY & ADDITIONAL TESTS:      ASSESSMENT:  25y Female s/p    LUMBAR RADICULOPATHY  No pertinent family history in first degree relatives  Handoff  MEWS Score  Lumbar radiculopathy  PCOS (polycystic ovarian syndrome)  Asthma  Lumbar radiculopathy  PCOS (polycystic ovarian syndrome)  Asthma  Cauda equina syndrome  Lumbar herniated disc  Cauda equina syndrome  Lumbar herniated disc  Lumbar radiculopathy  Other elevated white blood cell (WBC) count  Postoperative state  Preoperative clearance  Mild intermittent asthma without complication  Lumbar radiculopathy  Discectomy, spine, lumbar, 1 level, minimally invasive  No significant past surgical history  BACK PAIN  Lower extremity weakness  [email or web address]      PLAN:  NEURO:    CARDIOVASCULAR:    PULMONARY:    RENAL:    GI:    ID:    ENDO:    DVT PROPHYLAXIS:    DISPOSITION:       Assessment:  Present when checked    []  GCS  E   V  M     Heart Failure: []Acute, [] acute on chronic , []chronic  Heart Failure:  [] Diastolic (HFpEF), [] Systolic (HFrEF), []Combined (HFpEF and HFrEF), [] RHF, [] Pulm HTN, [] Other    [] ANDREA, [] ATN, [] AIN, [] other  [] CKD1, [] CKD2, [] CKD 3, [] CKD 4, [] CKD 5, []ESRD    Encephalopathy: [] Metabolic, [] Hepatic, [] toxic, [] Neurological, [] Other    Abnormal Nurtitional Status: [] malnurtition (see nutrition note), [ ]underweight: BMI < 19, [] morbid obesity: BMI >40, [] Cachexia    [] Sepsis  [] hypovolemic shock,[] cardiogenic shock, [] hemorrhagic shock, [] neuogenic shock  [] Acute Respiratory Failure  []Cerebral edema, [] Brain compression/ herniation,   [] Functional quadriplegia  [] Acute blood loss anemia S/Overnight events:  No events overnight    HPI:  24 y/o female pmhx PCOS, asthma, and lumbar HNP 2017 treated w/ conservative managemetn presents to [facility] ED today after being discharged early from OSH with continued LBP radiating to the RLE and now to the LLE. Patient was admitted and worked up for cauda equina at [facility] and treated for L5S1 HNP with LICHA, steroids, gabapentin and percocet. She was discharged home today but is still unable to walk without assistance. She was scheduled to start outpatient PT but the pain and weakness is worsening. She also reports numbness to the RLE down to the foot now with tingling sensation in the LLE. Denies any recent trauma, falls, denies any recent illness, fever or chills. Denies saddle anesthesia, denies b/b dysfunction.    Hospital Course:  8/15: Patient was admitted and worked up for cauda equina at [facility] and treated for L5-S1 HNP with LICHA, steroids, gabapentin and percocet, transferred here for further workup  8/16: LAZARO overnight. Neuro exam stable.   8/17 LAZARO overnight, Neuro exam stable  8/18: LAZARO overnight. Neuro exam stable. Plan for OR tomorrow   8/19: POD# 0 S/P L5-S1 microdiscectomy. Pt seen and examined at bedside postop. States mild incision site pain. Denies worsening weakness/loss of sensation of extremities.  8/20: LAZARO overnight, neuro stable. Patient c/o LLE weakness and numbness post op overnight   8/21: LAZARO overnight, continued new LLE weakness  8/22: LAZARO overnight. Neuro consulted requested per Dr. Denise for lower extremity weakness/"coldness"  8/23: LAZARO. Overnight. Neurology consulted, labs requested are ordered.       Hospital Course:   POD#       Vital Signs Last 24 Hrs  T(C): 36.7 (22 Aug 2020 20:35), Max: 36.9 (22 Aug 2020 09:01)  T(F): 98.1 (22 Aug 2020 20:35), Max: 98.5 (22 Aug 2020 16:45)  HR: 66 (22 Aug 2020 20:35) (66 - 76)  BP: 109/70 (22 Aug 2020 20:35) (95/58 - 111/77)  BP(mean): --  RR: 17 (22 Aug 2020 20:35) (16 - 17)  SpO2: 95% (22 Aug 2020 20:35) (95% - 97%)    I&O's Detail    21 Aug 2020 07:01  -  22 Aug 2020 07:00  --------------------------------------------------------  IN:    Oral Fluid: 840 mL  Total IN: 840 mL    OUT:    Voided: 2250 mL  Total OUT: 2250 mL    Total NET: -1410 mL      22 Aug 2020 07:01  -  23 Aug 2020 01:10  --------------------------------------------------------  IN:    Oral Fluid: 960 mL  Total IN: 960 mL    OUT:    Voided: 1100 mL  Total OUT: 1100 mL    Total NET: -140 mL        I&O's Summary    21 Aug 2020 07:01  -  22 Aug 2020 07:00  --------------------------------------------------------  IN: 840 mL / OUT: 2250 mL / NET: -1410 mL    22 Aug 2020 07:01  -  23 Aug 2020 01:10  --------------------------------------------------------  IN: 960 mL / OUT: 1100 mL / NET: -140 mL    PHYSICAL EXAM:  Gen: NAD  HEENT: PERRL. EOMI b/l  Neck: Supple   Lungs: CTAB  Heart: S1, S2. RRR  Abd: Soft, NT ND  Exts: 2+ pulses throughout  Neuro: AAO x 3. FC/OE, full 5/5 strength uppers. LLE - HF 3+/5, knee flex 3+/5, DF PF 4/5. RLE - 2+/5, knee flex 2+/5, DF PF 4/5        TUBES/LINES:  [] CVC  [] A-line  [] Lumbar Drain  [] Ventriculostomy  [] Other    DIET:  [] NPO  [] Mechanical  [] Tube feeds    LABS:                        13.1   14.58 )-----------( 373      ( 22 Aug 2020 06:54 )             40.4     08-22    136  |  98  |  19  ----------------------------<  104<H>  5.2   |  28  |  0.66    Ca    8.9      22 Aug 2020 06:54              CAPILLARY BLOOD GLUCOSE          Drug Levels: [] N/A    CSF Analysis: [] N/A      Allergies    No Known Allergies    Intolerances      MEDICATIONS:  Antibiotics:    Neuro:  acetaminophen   Tablet .. 650 milliGRAM(s) Oral every 6 hours PRN  diazepam    Tablet 5 milliGRAM(s) Oral every 8 hours PRN  gabapentin 300 milliGRAM(s) Oral three times a day  HYDROmorphone  Injectable 0.5 milliGRAM(s) IV Push every 3 hours PRN  ondansetron Injectable 4 milliGRAM(s) IV Push every 6 hours PRN  oxycodone    5 mG/acetaminophen 325 mG 1 Tablet(s) Oral every 4 hours PRN  oxycodone    5 mG/acetaminophen 325 mG 2 Tablet(s) Oral every 4 hours PRN    Anticoagulation:  enoxaparin Injectable 40 milliGRAM(s) SubCutaneous at bedtime    OTHER:  ALBUTerol    90 MICROgram(s) HFA Inhaler 2 Puff(s) Inhalation every 6 hours PRN  albuterol/ipratropium for Nebulization. 3 milliLiter(s) Nebulizer every 6 hours PRN  bisacodyl 5 milliGRAM(s) Oral at bedtime  BUpivacaine liposome 1.3% Injectable (no eMAR) 20 milliLiter(s) Local Injection once  dexAMETHasone     Tablet   Oral   dexAMETHasone     Tablet 2 milliGRAM(s) Oral every 8 hours  pantoprazole    Tablet 40 milliGRAM(s) Oral before breakfast  senna 2 Tablet(s) Oral at bedtime    IVF:  multivitamin 1 Tablet(s) Oral daily    CULTURES:    RADIOLOGY & ADDITIONAL TESTS:      ASSESSMENT:  25y Female s/p    LUMBAR RADICULOPATHY  No pertinent family history in first degree relatives  Handoff  MEWS Score  Lumbar radiculopathy  PCOS (polycystic ovarian syndrome)  Asthma  Lumbar radiculopathy  PCOS (polycystic ovarian syndrome)  Asthma  Cauda equina syndrome  Lumbar herniated disc  Cauda equina syndrome  Lumbar herniated disc  Lumbar radiculopathy  Other elevated white blood cell (WBC) count  Postoperative state  Preoperative clearance  Mild intermittent asthma without complication  Lumbar radiculopathy  Discectomy, spine, lumbar, 1 level, minimally invasive  No significant past surgical history  BACK PAIN  Lower extremity weakness  [email or web address]      PLAN:  Neuro and vital checks  Pain control as needed  BP goal normotensive  Bowel regimen   PPI for GI ppx     Neurology consulted requesting labs.  EMG/NCS - f/u with Dr. Amelia Hamilton d/w Dr. Denise      Assessment:  Present when checked    []  GCS  E   V  M     Heart Failure: []Acute, [] acute on chronic , []chronic  Heart Failure:  [] Diastolic (HFpEF), [] Systolic (HFrEF), []Combined (HFpEF and HFrEF), [] RHF, [] Pulm HTN, [] Other    [] ANDREA, [] ATN, [] AIN, [] other  [] CKD1, [] CKD2, [] CKD 3, [] CKD 4, [] CKD 5, []ESRD    Encephalopathy: [] Metabolic, [] Hepatic, [] toxic, [] Neurological, [] Other    Abnormal Nurtitional Status: [] malnurtition (see nutrition note), [ ]underweight: BMI < 19, [] morbid obesity: BMI >40, [] Cachexia    [] Sepsis  [] hypovolemic shock,[] cardiogenic shock, [] hemorrhagic shock, [] neuogenic shock  [] Acute Respiratory Failure  []Cerebral edema, [] Brain compression/ herniation,   [] Functional quadriplegia  [] Acute blood loss anemia

## 2020-08-24 LAB
ANION GAP SERPL CALC-SCNC: 10 MMOL/L — SIGNIFICANT CHANGE UP (ref 5–17)
BUN SERPL-MCNC: 21 MG/DL — SIGNIFICANT CHANGE UP (ref 7–23)
CALCIUM SERPL-MCNC: 8.6 MG/DL — SIGNIFICANT CHANGE UP (ref 8.4–10.5)
CHLORIDE SERPL-SCNC: 101 MMOL/L — SIGNIFICANT CHANGE UP (ref 96–108)
CO2 SERPL-SCNC: 27 MMOL/L — SIGNIFICANT CHANGE UP (ref 22–31)
CREAT SERPL-MCNC: 0.64 MG/DL — SIGNIFICANT CHANGE UP (ref 0.5–1.3)
DSDNA AB SER-ACNC: <12 IU/ML — SIGNIFICANT CHANGE UP
ERYTHROCYTE [SEDIMENTATION RATE] IN BLOOD: 10 MM/HR — SIGNIFICANT CHANGE UP
GLUCOSE SERPL-MCNC: 103 MG/DL — HIGH (ref 70–99)
HCT VFR BLD CALC: 39.7 % — SIGNIFICANT CHANGE UP (ref 34.5–45)
HGB BLD-MCNC: 12.7 G/DL — SIGNIFICANT CHANGE UP (ref 11.5–15.5)
MCHC RBC-ENTMCNC: 27.8 PG — SIGNIFICANT CHANGE UP (ref 27–34)
MCHC RBC-ENTMCNC: 32 GM/DL — SIGNIFICANT CHANGE UP (ref 32–36)
MCV RBC AUTO: 86.9 FL — SIGNIFICANT CHANGE UP (ref 80–100)
NRBC # BLD: 0 /100 WBCS — SIGNIFICANT CHANGE UP (ref 0–0)
PLATELET # BLD AUTO: 403 K/UL — HIGH (ref 150–400)
POTASSIUM SERPL-MCNC: 4.2 MMOL/L — SIGNIFICANT CHANGE UP (ref 3.5–5.3)
POTASSIUM SERPL-SCNC: 4.2 MMOL/L — SIGNIFICANT CHANGE UP (ref 3.5–5.3)
RBC # BLD: 4.57 M/UL — SIGNIFICANT CHANGE UP (ref 3.8–5.2)
RBC # FLD: 13.3 % — SIGNIFICANT CHANGE UP (ref 10.3–14.5)
SODIUM SERPL-SCNC: 138 MMOL/L — SIGNIFICANT CHANGE UP (ref 135–145)
T4 AB SER-ACNC: 7.98 UG/DL — SIGNIFICANT CHANGE UP (ref 3.17–11.72)
WBC # BLD: 16.04 K/UL — HIGH (ref 3.8–10.5)
WBC # FLD AUTO: 16.04 K/UL — HIGH (ref 3.8–10.5)

## 2020-08-24 PROCEDURE — 99233 SBSQ HOSP IP/OBS HIGH 50: CPT

## 2020-08-24 PROCEDURE — 99232 SBSQ HOSP IP/OBS MODERATE 35: CPT | Mod: GC

## 2020-08-24 RX ADMIN — HYDROMORPHONE HYDROCHLORIDE 0.5 MILLIGRAM(S): 2 INJECTION INTRAMUSCULAR; INTRAVENOUS; SUBCUTANEOUS at 20:57

## 2020-08-24 RX ADMIN — HYDROMORPHONE HYDROCHLORIDE 0.5 MILLIGRAM(S): 2 INJECTION INTRAMUSCULAR; INTRAVENOUS; SUBCUTANEOUS at 11:00

## 2020-08-24 RX ADMIN — GABAPENTIN 300 MILLIGRAM(S): 400 CAPSULE ORAL at 20:57

## 2020-08-24 RX ADMIN — ONDANSETRON 4 MILLIGRAM(S): 8 TABLET, FILM COATED ORAL at 08:52

## 2020-08-24 RX ADMIN — HYDROMORPHONE HYDROCHLORIDE 0.5 MILLIGRAM(S): 2 INJECTION INTRAMUSCULAR; INTRAVENOUS; SUBCUTANEOUS at 21:55

## 2020-08-24 RX ADMIN — OXYCODONE AND ACETAMINOPHEN 2 TABLET(S): 5; 325 TABLET ORAL at 06:05

## 2020-08-24 RX ADMIN — Medication 1 TABLET(S): at 14:19

## 2020-08-24 RX ADMIN — HYDROMORPHONE HYDROCHLORIDE 0.5 MILLIGRAM(S): 2 INJECTION INTRAMUSCULAR; INTRAVENOUS; SUBCUTANEOUS at 02:20

## 2020-08-24 RX ADMIN — GABAPENTIN 300 MILLIGRAM(S): 400 CAPSULE ORAL at 05:03

## 2020-08-24 RX ADMIN — HYDROMORPHONE HYDROCHLORIDE 0.5 MILLIGRAM(S): 2 INJECTION INTRAMUSCULAR; INTRAVENOUS; SUBCUTANEOUS at 10:44

## 2020-08-24 RX ADMIN — SENNA PLUS 2 TABLET(S): 8.6 TABLET ORAL at 20:57

## 2020-08-24 RX ADMIN — OXYCODONE AND ACETAMINOPHEN 2 TABLET(S): 5; 325 TABLET ORAL at 19:07

## 2020-08-24 RX ADMIN — Medication 5 MILLIGRAM(S): at 12:38

## 2020-08-24 RX ADMIN — Medication 5 MILLIGRAM(S): at 20:57

## 2020-08-24 RX ADMIN — OXYCODONE AND ACETAMINOPHEN 2 TABLET(S): 5; 325 TABLET ORAL at 00:18

## 2020-08-24 RX ADMIN — HYDROMORPHONE HYDROCHLORIDE 0.5 MILLIGRAM(S): 2 INJECTION INTRAMUSCULAR; INTRAVENOUS; SUBCUTANEOUS at 02:05

## 2020-08-24 RX ADMIN — GABAPENTIN 300 MILLIGRAM(S): 400 CAPSULE ORAL at 14:19

## 2020-08-24 RX ADMIN — Medication 2 MILLIGRAM(S): at 14:19

## 2020-08-24 RX ADMIN — Medication 2 MILLIGRAM(S): at 05:03

## 2020-08-24 RX ADMIN — OXYCODONE AND ACETAMINOPHEN 2 TABLET(S): 5; 325 TABLET ORAL at 05:03

## 2020-08-24 RX ADMIN — PANTOPRAZOLE SODIUM 40 MILLIGRAM(S): 20 TABLET, DELAYED RELEASE ORAL at 05:06

## 2020-08-24 RX ADMIN — OXYCODONE AND ACETAMINOPHEN 2 TABLET(S): 5; 325 TABLET ORAL at 18:07

## 2020-08-24 RX ADMIN — ENOXAPARIN SODIUM 40 MILLIGRAM(S): 100 INJECTION SUBCUTANEOUS at 20:57

## 2020-08-24 NOTE — PROGRESS NOTE ADULT - SUBJECTIVE AND OBJECTIVE BOX
NEUROLOGY CONSULT: PROGRESS NOTE    INTERVAL HISTORY:  Patient s/p L5-S1 microdisectomy in 8/19. Complaining of left sided numbness and weakness in lower extremity (new after surgery per patient); still complaining of right sided weakness in lower extremity as well.     SUBJECTIVE:      MEDICATIONS:  acetaminophen   Tablet .. 650 milliGRAM(s) Oral every 6 hours PRN  ALBUTerol    90 MICROgram(s) HFA Inhaler 2 Puff(s) Inhalation every 6 hours PRN  albuterol/ipratropium for Nebulization. 3 milliLiter(s) Nebulizer every 6 hours PRN  bisacodyl 5 milliGRAM(s) Oral at bedtime  BUpivacaine liposome 1.3% Injectable (no eMAR) 20 milliLiter(s) Local Injection once  dexAMETHasone     Tablet   Oral   dexAMETHasone     Tablet 2 milliGRAM(s) Oral every 8 hours  dexAMETHasone     Tablet 2 milliGRAM(s) Oral two times a day  diazepam    Tablet 5 milliGRAM(s) Oral every 8 hours PRN  enoxaparin Injectable 40 milliGRAM(s) SubCutaneous at bedtime  gabapentin 300 milliGRAM(s) Oral three times a day  HYDROmorphone  Injectable 0.5 milliGRAM(s) IV Push every 3 hours PRN  multivitamin 1 Tablet(s) Oral daily  ondansetron Injectable 4 milliGRAM(s) IV Push every 6 hours PRN  oxycodone    5 mG/acetaminophen 325 mG 1 Tablet(s) Oral every 4 hours PRN  oxycodone    5 mG/acetaminophen 325 mG 2 Tablet(s) Oral every 4 hours PRN  pantoprazole    Tablet 40 milliGRAM(s) Oral before breakfast  senna 2 Tablet(s) Oral at bedtime    VITAL SIGNS:  Vital Signs Last 24 Hrs  T(C): 37 (24 Aug 2020 08:33), Max: 37 (24 Aug 2020 08:33)  T(F): 98.6 (24 Aug 2020 08:33), Max: 98.6 (24 Aug 2020 08:33)  HR: 74 (24 Aug 2020 08:33) (61 - 74)  BP: 111/68 (24 Aug 2020 08:33) (102/66 - 114/74)  BP(mean): --  RR: 15 (24 Aug 2020 08:33) (15 - 17)  SpO2: 97% (24 Aug 2020 08:33) (96% - 97%)    PHYSICAL EXAMINATION:  General: Comfortable  Neurologic:     -Mental Status: AAOx3. Speech is fluent with intact naming, repetition, and comprehension, no dysarthria. Recent and remote memory intact. Follows commands. Attention/concentration intact. Fund of knowledge appropriate.     -Cranial Nerves:          II: Visual fields are full to confrontation.          III, IV, VI: EOMI without nystagmus. PERRL b/l          V:  Facial sensation V1-V3 equal and intact           VII: Face is symmetric with normal eye closure and smile          VIII: Hearing is bilaterally intact to finger rub          IX, X: Uvula is midline and soft palate rises symmetrically          XI: Head turning and shoulder shrug are intact.          XII: Tongue protrudes midline     -Motor: Normal bulk and tone. No involuntary movements (tremor, myoclonus, chorea, athetosis, dystonia)          Upper extremities: shoulder abduction, elbow flexion/extension, wrist flexion/extension, handgrip          Lower extremities: 3/5 strength LLE, 4/5 strength RLE          No pronator drift. Rapid alternating movements intact and symmetric     -Sensation: Intact to light touch. Pain and temperature intact. Vibration sense intact.           No neglect or extinction on double simultaneous testing.     -Coordination: No dysmetria on finger-to-nose and heel-to-shin intact bilaterally, rapid alternating hand movements intact     -Reflexes: 2+ and symmetric at biceps, triceps, brachioradialis, patellar, ankles          Plantar reflexes downgoing bilaterally. Downgoing toes bilaterally      -Gait: narrow and steady      LABS:                          12.7   16.04 )-----------( 403      ( 24 Aug 2020 05:45 )             39.7     08-24    138  |  101  |  21  ----------------------------<  103<H>  4.2   |  27  |  0.64    Ca    8.6      24 Aug 2020 05:45            RADIOLOGY & ADDITIONAL STUDIES:  reviewed NEUROLOGY CONSULT: PROGRESS NOTE    INTERVAL HISTORY:  Patient s/p L5-S1 microdisectomy in 8/19. Complaining of left sided numbness and weakness in lower extremity (new after surgery per patient); still complaining of right sided weakness in lower extremity as well.     SUBJECTIVE:      MEDICATIONS:  acetaminophen   Tablet .. 650 milliGRAM(s) Oral every 6 hours PRN  ALBUTerol    90 MICROgram(s) HFA Inhaler 2 Puff(s) Inhalation every 6 hours PRN  albuterol/ipratropium for Nebulization. 3 milliLiter(s) Nebulizer every 6 hours PRN  bisacodyl 5 milliGRAM(s) Oral at bedtime  BUpivacaine liposome 1.3% Injectable (no eMAR) 20 milliLiter(s) Local Injection once  dexAMETHasone     Tablet   Oral   dexAMETHasone     Tablet 2 milliGRAM(s) Oral every 8 hours  dexAMETHasone     Tablet 2 milliGRAM(s) Oral two times a day  diazepam    Tablet 5 milliGRAM(s) Oral every 8 hours PRN  enoxaparin Injectable 40 milliGRAM(s) SubCutaneous at bedtime  gabapentin 300 milliGRAM(s) Oral three times a day  HYDROmorphone  Injectable 0.5 milliGRAM(s) IV Push every 3 hours PRN  multivitamin 1 Tablet(s) Oral daily  ondansetron Injectable 4 milliGRAM(s) IV Push every 6 hours PRN  oxycodone    5 mG/acetaminophen 325 mG 1 Tablet(s) Oral every 4 hours PRN  oxycodone    5 mG/acetaminophen 325 mG 2 Tablet(s) Oral every 4 hours PRN  pantoprazole    Tablet 40 milliGRAM(s) Oral before breakfast  senna 2 Tablet(s) Oral at bedtime    VITAL SIGNS:  Vital Signs Last 24 Hrs  T(C): 37 (24 Aug 2020 08:33), Max: 37 (24 Aug 2020 08:33)  T(F): 98.6 (24 Aug 2020 08:33), Max: 98.6 (24 Aug 2020 08:33)  HR: 74 (24 Aug 2020 08:33) (61 - 74)  BP: 111/68 (24 Aug 2020 08:33) (102/66 - 114/74)  BP(mean): --  RR: 15 (24 Aug 2020 08:33) (15 - 17)  SpO2: 97% (24 Aug 2020 08:33) (96% - 97%)    PHYSICAL EXAMINATION:  General: Comfortable  Neurologic:     -Mental Status: AAOx3. Speech is fluent with intact naming, repetition, and comprehension, no dysarthria. Recent and remote memory intact. Follows commands. Attention/concentration intact. Fund of knowledge appropriate.     -Cranial Nerves:          II: Visual fields are full to confrontation.          III, IV, VI: EOMI without nystagmus. PERRL b/l          V:  Facial sensation V1-V3 equal and intact           VII: Face is symmetric with normal eye closure and smile          VIII: Hearing is bilaterally intact to finger rub          IX, X: Uvula is midline and soft palate rises symmetrically          XI: Head turning and shoulder shrug are intact.          XII: Tongue protrudes midline     -Motor: Normal bulk and tone. No involuntary movements (tremor, myoclonus, chorea, athetosis, dystonia)          Upper extremities: shoulder abduction, elbow flexion/extension, wrist flexion/extension, handgrip          Lower extremities: strength to confrontation limited by inconsistent effort and give-way, but with encouragement at least 4 to 4+/5 throughout           No pronator drift. Rapid alternating movements intact and symmetric     -Sensation: Intact to light touch. Pain and temperature intact. Vibration sense intact.           No neglect or extinction on double simultaneous testing.     -Coordination: No dysmetria on finger-to-nose and heel-to-shin intact bilaterally, rapid alternating hand movements intact     -Reflexes: 2+ and symmetric at biceps, triceps, brachioradialis, patellar b/l; right achilles 2+, left achilles absent           Plantar reflexes downgoing bilaterally     -Gait: deferred      LABS:                          12.7   16.04 )-----------( 403      ( 24 Aug 2020 05:45 )             39.7     08-24    138  |  101  |  21  ----------------------------<  103<H>  4.2   |  27  |  0.64    Ca    8.6      24 Aug 2020 05:45      RADIOLOGY & ADDITIONAL STUDIES:  reviewed

## 2020-08-24 NOTE — PROGRESS NOTE ADULT - SUBJECTIVE AND OBJECTIVE BOX
Interval Events: Reviewed  Patient seen and examined at bedside.    Patient is a 25y old  Female who presents with a chief complaint of worsening LBP pain/right leg weakness. (24 Aug 2020 11:50)  she is still about the same    PAST MEDICAL & SURGICAL HISTORY:  Lumbar radiculopathy: 2017  PCOS (polycystic ovarian syndrome)  Asthma  No significant past surgical history      MEDICATIONS:  Pulmonary:  ALBUTerol    90 MICROgram(s) HFA Inhaler 2 Puff(s) Inhalation every 6 hours PRN  albuterol/ipratropium for Nebulization. 3 milliLiter(s) Nebulizer every 6 hours PRN    Antimicrobials:    Anticoagulants:  enoxaparin Injectable 40 milliGRAM(s) SubCutaneous at bedtime    Cardiac:      Allergies    No Known Allergies    Intolerances        Vital Signs Last 24 Hrs  T(C): 36.7 (24 Aug 2020 20:47), Max: 37 (24 Aug 2020 08:33)  T(F): 98.1 (24 Aug 2020 20:47), Max: 98.6 (24 Aug 2020 08:33)  HR: 66 (24 Aug 2020 20:47) (61 - 74)  BP: 108/67 (24 Aug 2020 20:47) (104/64 - 114/74)  BP(mean): --  RR: 17 (24 Aug 2020 20:47) (15 - 17)  SpO2: 97% (24 Aug 2020 20:47) (96% - 97%)        Review of Systems:   •	General: negative  •	Skin/Breast: negative  •	Ophthalmologic: negative  •	ENMT: negative  •	Respiratory and Thorax: negative  •	Cardiovascular: negative  •	Gastrointestinal: negative  •	Genitourinary: negative  •	Musculoskeletal: negative  •	Neurological: negative  •	Psychiatric: negative  •	Hematology/Lymphatics: negative  •	Endocrine: negative  •	Allergic/Immunologic: negative    Physical Exam:   • Constitutional:	Well-developed, well nourished  • Eyes:	EOMI; PERRL; no drainage or redness  • ENMT:	No oral lesions; no gross abnormalities  • Neck	No bruits; no thyromegaly or nodules  • Breasts:	not examined  • Back:	No deformity or limitation of movement  • Respiratory:	Breath Sounds equal & clear to percussion & auscultation, no accessory muscle use  • Cardiovascular:	Regular rate & rhythm, normal S1, S2; no murmurs, gallops or rubs; no S3, S4  • Gastrointestinal:	Soft, non-tender, no hepatosplenomegaly, normal bowel sounds  • Genitourinary:	not examined  • Rectal: not examined  • Extremities:	No cyanosis, clubbing or edema  • Vascular:	Equal and normal pulses (carotid, femoral, dorsalis pedis)  • Neurologica:l	not examined  • Skin:	No lesions; no rash  • Lymph Nodes:	No lymphadedenopathy  • Musculoskeletal:	No joint pain, swelling or deformity; no limitation of movement        LABS:      CBC Full  -  ( 24 Aug 2020 05:45 )  WBC Count : 16.04 K/uL  RBC Count : 4.57 M/uL  Hemoglobin : 12.7 g/dL  Hematocrit : 39.7 %  Platelet Count - Automated : 403 K/uL  Mean Cell Volume : 86.9 fl  Mean Cell Hemoglobin : 27.8 pg  Mean Cell Hemoglobin Concentration : 32.0 gm/dL  Auto Neutrophil # : x  Auto Lymphocyte # : x  Auto Monocyte # : x  Auto Eosinophil # : x  Auto Basophil # : x  Auto Neutrophil % : x  Auto Lymphocyte % : x  Auto Monocyte % : x  Auto Eosinophil % : x  Auto Basophil % : x    08-24    138  |  101  |  21  ----------------------------<  103<H>  4.2   |  27  |  0.64    Ca    8.6      24 Aug 2020 05:45                          RADIOLOGY & ADDITIONAL STUDIES (The following images were personally reviewed):  Hendrix:                                     No  Urine output:                       adequate  DVT prophylaxis:                 Yes  Flattus:                                  Yes  Bowel movement:              No

## 2020-08-24 NOTE — CHART NOTE - NSCHARTNOTEFT_GEN_A_CORE
Admitting Diagnosis:   Patient is a 25y old  Female who presents with a chief complaint of worsening LBP pain/right leg weakness. (24 Aug 2020 11:50)    PAST MEDICAL & SURGICAL HISTORY:  Lumbar radiculopathy: 2017  PCOS (polycystic ovarian syndrome)  Asthma  No significant past surgical history    Current Nutrition Order: regular diet     PO Intake: Good (%) [ X ]  Fair (50-75%) [ X  ] Poor (<25%) [   ]    GI Issues: pt states she occasionally feels nausea when she works with PT (resolved with zofran), last BM 8/23 per pt    Pain: pain better controlled today per pt    Skin Integrity: Aidan score 20    Labs:   08-24    138  |  101  |  21  ----------------------------<  103<H>  4.2   |  27  |  0.64    Ca    8.6      24 Aug 2020 05:45    Medications:  MEDICATIONS  (STANDING):  bisacodyl 5 milliGRAM(s) Oral at bedtime  BUpivacaine liposome 1.3% Injectable (no eMAR) 20 milliLiter(s) Local Injection once  dexAMETHasone     Tablet   Oral   dexAMETHasone     Tablet 2 milliGRAM(s) Oral every 8 hours  dexAMETHasone     Tablet 2 milliGRAM(s) Oral two times a day  enoxaparin Injectable 40 milliGRAM(s) SubCutaneous at bedtime  gabapentin 300 milliGRAM(s) Oral three times a day  multivitamin 1 Tablet(s) Oral daily  pantoprazole    Tablet 40 milliGRAM(s) Oral before breakfast  senna 2 Tablet(s) Oral at bedtime    MEDICATIONS  (PRN):  acetaminophen   Tablet .. 650 milliGRAM(s) Oral every 6 hours PRN Temp greater or equal to 38C (100.4F), Mild Pain (1 - 3)  ALBUTerol    90 MICROgram(s) HFA Inhaler 2 Puff(s) Inhalation every 6 hours PRN Shortness of Breath and/or Wheezing  albuterol/ipratropium for Nebulization. 3 milliLiter(s) Nebulizer every 6 hours PRN Shortness of Breath and/or Wheezing  diazepam    Tablet 5 milliGRAM(s) Oral every 8 hours PRN muscle spasm  HYDROmorphone  Injectable 0.5 milliGRAM(s) IV Push every 3 hours PRN breakthrough pain  ondansetron Injectable 4 milliGRAM(s) IV Push every 6 hours PRN Nausea  oxycodone    5 mG/acetaminophen 325 mG 1 Tablet(s) Oral every 4 hours PRN Moderate Pain (4 - 6)  oxycodone    5 mG/acetaminophen 325 mG 2 Tablet(s) Oral every 4 hours PRN Severe Pain (7 - 10)    Weight:  86.2kg (8/14)  86.2kg (8/19)    Weight Change: no changes in weight. Please obtain current weight and trend weekly weights for further assessment.    Admitted Anthropometrics:  Ht (8/14): 165.1cm, Wt (8/14): 86.2kg, IBW: 56.8kg+/-10%, %IBW: 151%, BMI: 31.6     Nutrition Focused Physical Exam: Completed [ X on 8/18  ]  Unable to complete [   ]  No significant findings on NFPE    Estimated energy needs:   IBW (56.8kg) used to calculate energy needs due to pt's current body weight exceeding 120% of IBW.  Needs adjusted for pre-op, overweight status).     5563-2420 kcal (25-30 kcal/kg IBW)  68-80gm protein (1.2-1.4gm protein/kg IBW)  1420-1704mL (25-30 mL/kg IBW)    Subjective: 26 y/o female with PMHX PCOS, asthma, p/w worsening LBP, a/w RLE weakness, numbness, noted to have L5-S1 HNP. Now s/p L5-S1 microdisectomy in 8/19. Per chart, pt complaining of left sided numbness and weakness in lower extremity and right sided weakness in lower extremity as well. Pt resting in bed on assessment. Pt states her appetite was decreased after surgery but is now improving and is consuming more of her meals. Of note, pt on decadron, A1c 5.8% (8/23), BG levels controlled at thid time, elevated A1c likely steroid induced. Please see below for full nutritional recommendations- d/w team. RD to monitor and f/u per protocol.    Nutrition Diagnosis:  Increased nutrient need increased protein demand AEB pre-op-N/A    PES: Increased nutrient need increased protein demand AEB post-op    Goal: Pt to meet >/=75%EER PO with good tolerance    Recommendations:  1. Recommend continue regular diet as medically appropriate  >>monitor need for CSTCHO dieatry restriction based on BG levels  2. Monitor labs (BMP, lytes); replete lytes prn  3. Obtain current weight and trend weekly weights    Education: Encouraged continued adequate PO intake with emphasis on lean protein- pt appeared receptive    Risk Level: High [   ] Moderate [ X ]  Low [   ] Admitting Diagnosis:   Patient is a 25y old  Female who presents with a chief complaint of worsening LBP pain/right leg weakness. (24 Aug 2020 11:50)    PAST MEDICAL & SURGICAL HISTORY:  Lumbar radiculopathy: 2017  PCOS (polycystic ovarian syndrome)  Asthma  No significant past surgical history    Current Nutrition Order: regular diet     PO Intake: Good (%) [ X ]  Fair (50-75%) [ X  ] Poor (<25%) [   ]    GI Issues: pt states she occasionally feels nausea when she works with PT (resolved with zofran), last BM 8/23 per pt    Pain: pain better controlled today per pt    Skin Integrity: Aidan score 20    Labs:   08-24    138  |  101  |  21  ----------------------------<  103<H>  4.2   |  27  |  0.64    Ca    8.6      24 Aug 2020 05:45    Medications:  MEDICATIONS  (STANDING):  bisacodyl 5 milliGRAM(s) Oral at bedtime  BUpivacaine liposome 1.3% Injectable (no eMAR) 20 milliLiter(s) Local Injection once  dexAMETHasone     Tablet   Oral   dexAMETHasone     Tablet 2 milliGRAM(s) Oral every 8 hours  dexAMETHasone     Tablet 2 milliGRAM(s) Oral two times a day  enoxaparin Injectable 40 milliGRAM(s) SubCutaneous at bedtime  gabapentin 300 milliGRAM(s) Oral three times a day  multivitamin 1 Tablet(s) Oral daily  pantoprazole    Tablet 40 milliGRAM(s) Oral before breakfast  senna 2 Tablet(s) Oral at bedtime    MEDICATIONS  (PRN):  acetaminophen   Tablet .. 650 milliGRAM(s) Oral every 6 hours PRN Temp greater or equal to 38C (100.4F), Mild Pain (1 - 3)  ALBUTerol    90 MICROgram(s) HFA Inhaler 2 Puff(s) Inhalation every 6 hours PRN Shortness of Breath and/or Wheezing  albuterol/ipratropium for Nebulization. 3 milliLiter(s) Nebulizer every 6 hours PRN Shortness of Breath and/or Wheezing  diazepam    Tablet 5 milliGRAM(s) Oral every 8 hours PRN muscle spasm  HYDROmorphone  Injectable 0.5 milliGRAM(s) IV Push every 3 hours PRN breakthrough pain  ondansetron Injectable 4 milliGRAM(s) IV Push every 6 hours PRN Nausea  oxycodone    5 mG/acetaminophen 325 mG 1 Tablet(s) Oral every 4 hours PRN Moderate Pain (4 - 6)  oxycodone    5 mG/acetaminophen 325 mG 2 Tablet(s) Oral every 4 hours PRN Severe Pain (7 - 10)    Weight:  86.2kg (8/14)  86.2kg (8/19)    Weight Change: no changes in weight. Please obtain current weight and trend weekly weights for further assessment.    Admitted Anthropometrics:  Ht (8/14): 165.1cm, Wt (8/14): 86.2kg, IBW: 56.8kg+/-10%, %IBW: 151%, BMI: 31.6     Nutrition Focused Physical Exam: Completed [ X on 8/18  ]  Unable to complete [   ]  No significant findings on NFPE    Estimated energy needs:   IBW (56.8kg) used to calculate energy needs due to pt's current body weight exceeding 120% of IBW.  Needs adjusted for pre-op, overweight status).     6694-3314 kcal (25-30 kcal/kg IBW)  68-80gm protein (1.2-1.4gm protein/kg IBW)  1420-1704mL (25-30 mL/kg IBW)    Subjective: 24 y/o female with PMHX PCOS, asthma, p/w worsening LBP, a/w RLE weakness, numbness, noted to have L5-S1 HNP. Now s/p L5-S1 microdisectomy in 8/19. Per chart, pt complaining of left sided numbness and weakness in lower extremity and right sided weakness in lower extremity as well. Pt resting in bed on assessment. Pt states her appetite was decreased after surgery but is now improving and is consuming more of her meals. Of note, pt on decadron, A1c 5.8% (8/23), BG levels controlled at thid time, elevated A1c likely steroid induced. Pt awaiting acceptance to acute rehab per AM rounds. Please see below for full nutritional recommendations- d/w team. RD to monitor and f/u per protocol.    Nutrition Diagnosis:  Increased nutrient need increased protein demand AEB pre-op-N/A    PES: Increased nutrient need increased protein demand AEB post-op    Goal: Pt to meet >/=75%EER PO with good tolerance    Recommendations:  1. Recommend continue regular diet as medically appropriate  >>monitor need for CSTCHO dieatry restriction based on BG levels  2. Monitor labs (BMP, lytes); replete lytes prn  3. Obtain current weight and trend weekly weights    Education: Encouraged continued adequate PO intake with emphasis on lean protein- pt appeared receptive    Risk Level: High [   ] Moderate [ X ]  Low [   ]

## 2020-08-24 NOTE — PROGRESS NOTE ADULT - ASSESSMENT
25y old  Female who presents with a chief complaint of worsening LBP pain with LE numbness/weakness and intermittent RUE numbness, x 1 month - s/p surgery on 8/17  Neuro-imaging does not explain current symptoms.  Symptoms and timeline not consistent with GBS. Neuro exam effort limited and variable raising and  concern for functional component.      Recommendations:  Metabolic work up including thyroid studies, B12/folate, A1c within normal limits.   -Follow up remainder of lab work including CK, MARCOS, DsDNA    Symptoms likely psychosomatic and likely etiology related to lumbar pathology. No further neurological work up recommended at this time. If symptoms persist for several weeks despite rehab, can consider EMG at that point as an outpatient.     Neurology will sign off, please reach out if there are additional questions or concerns    Case discussed with attending  Darrius Contreras PGY2 25y old  Female who presents with a chief complaint of worsening LBP pain with LE numbness/weakness and intermittent RUE numbness, x 1 month - s/p surgery on 8/17  Neuro-imaging does not explain current symptoms.  Symptoms and timeline not consistent with GBS. Neuro exam effort limited and variable raising and  concern for functional component.      thyroid studies, B12/folate, A1c unremarkable; f/u CK, MARCOS, DsDNA     If symptoms persist for after discharge from rehab, can consider EMG at that point as an outpatient.     Neurology will sign off, please reach out if there are additional questions or concerns    Case discussed with attending  Darrius Contreras PGY2

## 2020-08-25 DIAGNOSIS — F41.9 ANXIETY DISORDER, UNSPECIFIED: ICD-10-CM

## 2020-08-25 LAB
ANA TITR SER: NEGATIVE — SIGNIFICANT CHANGE UP
ANION GAP SERPL CALC-SCNC: 9 MMOL/L — SIGNIFICANT CHANGE UP (ref 5–17)
BUN SERPL-MCNC: 19 MG/DL — SIGNIFICANT CHANGE UP (ref 7–23)
CALCIUM SERPL-MCNC: 8.9 MG/DL — SIGNIFICANT CHANGE UP (ref 8.4–10.5)
CHLORIDE SERPL-SCNC: 99 MMOL/L — SIGNIFICANT CHANGE UP (ref 96–108)
CO2 SERPL-SCNC: 29 MMOL/L — SIGNIFICANT CHANGE UP (ref 22–31)
CREAT SERPL-MCNC: 0.68 MG/DL — SIGNIFICANT CHANGE UP (ref 0.5–1.3)
GLUCOSE SERPL-MCNC: 92 MG/DL — SIGNIFICANT CHANGE UP (ref 70–99)
HCT VFR BLD CALC: 41.6 % — SIGNIFICANT CHANGE UP (ref 34.5–45)
HGB BLD-MCNC: 13.5 G/DL — SIGNIFICANT CHANGE UP (ref 11.5–15.5)
MCHC RBC-ENTMCNC: 28.2 PG — SIGNIFICANT CHANGE UP (ref 27–34)
MCHC RBC-ENTMCNC: 32.5 GM/DL — SIGNIFICANT CHANGE UP (ref 32–36)
MCV RBC AUTO: 87 FL — SIGNIFICANT CHANGE UP (ref 80–100)
NRBC # BLD: 0 /100 WBCS — SIGNIFICANT CHANGE UP (ref 0–0)
PLATELET # BLD AUTO: 358 K/UL — SIGNIFICANT CHANGE UP (ref 150–400)
POTASSIUM SERPL-MCNC: 4.4 MMOL/L — SIGNIFICANT CHANGE UP (ref 3.5–5.3)
POTASSIUM SERPL-SCNC: 4.4 MMOL/L — SIGNIFICANT CHANGE UP (ref 3.5–5.3)
RBC # BLD: 4.78 M/UL — SIGNIFICANT CHANGE UP (ref 3.8–5.2)
RBC # FLD: 13.6 % — SIGNIFICANT CHANGE UP (ref 10.3–14.5)
SODIUM SERPL-SCNC: 137 MMOL/L — SIGNIFICANT CHANGE UP (ref 135–145)
WBC # BLD: 14.96 K/UL — HIGH (ref 3.8–10.5)
WBC # FLD AUTO: 14.96 K/UL — HIGH (ref 3.8–10.5)

## 2020-08-25 PROCEDURE — 99223 1ST HOSP IP/OBS HIGH 75: CPT

## 2020-08-25 PROCEDURE — 93970 EXTREMITY STUDY: CPT | Mod: 26

## 2020-08-25 PROCEDURE — 99024 POSTOP FOLLOW-UP VISIT: CPT

## 2020-08-25 RX ADMIN — PANTOPRAZOLE SODIUM 40 MILLIGRAM(S): 20 TABLET, DELAYED RELEASE ORAL at 05:13

## 2020-08-25 RX ADMIN — HYDROMORPHONE HYDROCHLORIDE 0.5 MILLIGRAM(S): 2 INJECTION INTRAMUSCULAR; INTRAVENOUS; SUBCUTANEOUS at 20:00

## 2020-08-25 RX ADMIN — Medication 5 MILLIGRAM(S): at 11:02

## 2020-08-25 RX ADMIN — Medication 5 MILLIGRAM(S): at 00:19

## 2020-08-25 RX ADMIN — GABAPENTIN 300 MILLIGRAM(S): 400 CAPSULE ORAL at 12:07

## 2020-08-25 RX ADMIN — OXYCODONE AND ACETAMINOPHEN 2 TABLET(S): 5; 325 TABLET ORAL at 16:05

## 2020-08-25 RX ADMIN — GABAPENTIN 300 MILLIGRAM(S): 400 CAPSULE ORAL at 22:05

## 2020-08-25 RX ADMIN — ENOXAPARIN SODIUM 40 MILLIGRAM(S): 100 INJECTION SUBCUTANEOUS at 22:05

## 2020-08-25 RX ADMIN — Medication 5 MILLIGRAM(S): at 22:07

## 2020-08-25 RX ADMIN — Medication 2 MILLIGRAM(S): at 06:53

## 2020-08-25 RX ADMIN — SENNA PLUS 2 TABLET(S): 8.6 TABLET ORAL at 22:05

## 2020-08-25 RX ADMIN — ONDANSETRON 4 MILLIGRAM(S): 8 TABLET, FILM COATED ORAL at 16:37

## 2020-08-25 RX ADMIN — GABAPENTIN 300 MILLIGRAM(S): 400 CAPSULE ORAL at 05:13

## 2020-08-25 RX ADMIN — Medication 5 MILLIGRAM(S): at 22:05

## 2020-08-25 RX ADMIN — OXYCODONE AND ACETAMINOPHEN 2 TABLET(S): 5; 325 TABLET ORAL at 15:36

## 2020-08-25 RX ADMIN — Medication 1 TABLET(S): at 12:07

## 2020-08-25 RX ADMIN — HYDROMORPHONE HYDROCHLORIDE 0.5 MILLIGRAM(S): 2 INJECTION INTRAMUSCULAR; INTRAVENOUS; SUBCUTANEOUS at 20:15

## 2020-08-25 RX ADMIN — OXYCODONE AND ACETAMINOPHEN 2 TABLET(S): 5; 325 TABLET ORAL at 09:45

## 2020-08-25 RX ADMIN — OXYCODONE AND ACETAMINOPHEN 2 TABLET(S): 5; 325 TABLET ORAL at 05:13

## 2020-08-25 RX ADMIN — OXYCODONE AND ACETAMINOPHEN 2 TABLET(S): 5; 325 TABLET ORAL at 10:12

## 2020-08-25 RX ADMIN — Medication 2 MILLIGRAM(S): at 17:26

## 2020-08-25 NOTE — BEHAVIORAL HEALTH ASSESSMENT NOTE - NS ED B[facility] MED ROS ALLERGIC IMMUNOLOGIC
Chief complaint:   Chief Complaint   Patient presents with   • Follow-up     chest tightness when walking up hill       Vitals:  Visit Vitals   • /70 (BP Location: [facility] [place], Patient Position: Sitting, Cuff Size: Regular)   • Pulse 68  Comment: apical and regular   • Resp 12   • Ht 5' 7\" (1.702 m)   • Wt 96.4 kg   • SpO2 100%  Comment: on room air   • BMI 33.28 kg/m2       HISTORY OF PRESENT ILLNESS     HPI  This is a pleasant 79 year old female who presents today for a follow up. She describes an episode of chest pressure a couple weeks ago after walking up a hill that lasted for less than one minute. She denies any other associated symptoms. She denies any other chest pain episodes since. She reports some mild shortness of breath with activity but states she has not been as active and may be deconditioned due to a knee injury last year.     She is planning to go on a cruise this month and is concerned about her symptoms.    She has had some GI problems with reflux and underwent.EGD last year.   She denies any history of smoking or excessive alcohol intake    At her last visit, she was prescribed atorvastatin 20mg daily for her high cholesterol. LDL is much improved at 71    She has a history of Atrial fibrillation prior to and during her coloscopy procedure in January 2015. She remains on Eliquis      Other significant problems:  Patient Active Problem List    Diagnosis Date Noted   • Combined hyperlipidemia 04/11/2016     Priority: Low   • Atrial fibrillation 02/05/2015     Priority: Low   • Unspecified essential hypertension 07/01/2014     Priority: Low   • Unspecified hypothyroidism 03/11/2014     Priority: Low   • Benign essential hypertension 03/11/2014     Priority: Low       PAST MEDICAL, FAMILY AND SOCIAL HISTORY     Medications:  Current Outpatient Prescriptions   Medication   • metoPROLOL (LOPRESSOR) 25 MG tablet   • apixaban (ELIQUIS) 5 MG Tab   • hydrochlorothiazide (HYDRODIURIL) 50 MG tablet   •  venlafaxine XR (EFFEXOR XR) 150 MG 24 hr capsule   • levothyroxine (SYNTHROID, LEVOTHROID) 75 MCG tablet       Allergies:  ALLERGIES:  No Known Allergies    Past Medical  History/Surgeries:  Past Medical History   Diagnosis Date   • AF (atrial fibrillation)    • Hypertension    • Hypothyroidism        Past Surgical History   Procedure Laterality Date   • Arthroscopy knee medial or lat  01/01/2006     right knee   • Eye surgery       bilat cataracts   • Service to gastroenterology     • Colonoscopy  01/30/2015     Repeat 10 years       Family History:  Family History   Problem Relation Age of Onset   • Heart disease Mother    • Heart disease Father    • NEGATIVE FAMILY HX OF Sister    • Heart disease Brother        Social History:  Social History   Substance Use Topics   • Smoking status: Never Smoker   • Smokeless tobacco: Never Used   • Alcohol use Yes      Comment: 1-2 per month       REVIEW OF SYSTEMS     Review of Systems   Constitutional: Negative.    HENT: Negative.    Eyes: Negative.    Respiratory: Per HPI  Cardiovascular: per HPI   Gastrointestinal: Negative for abdominal distention.   Genitourinary: Negative for dysuria, enuresis, difficulty urinating and dyspareunia.   Musculoskeletal: Negative.    Neurological: Negative.    Psychiatric/Behavioral: Negative.        PHYSICAL EXAM     Physical Exam  Vital Signs:    Visit Vitals   • /70 (BP Location: [facility] [place], Patient Position: Sitting, Cuff Size: Regular)   • Pulse 68  Comment: apical and regular   • Resp 12   • Ht 5' 7\" (1.702 m)   • Wt 96.4 kg   • SpO2 100%  Comment: on room air   • BMI 33.28 kg/m2       General:   Alert, cooperative, conversive in no acute distress.  Skin:  Warm and dry without rash.    HEENT:  Normocephalic-atraumatic, trachea midline; Normal conjunctiva and sclera.  PERRL.  EOMI.  Cardiovascular:  Symmetrical pulses.  Regular, rate and rhythm without murmur.  Respiratory:  Normal respiratory effort.  CTA. No wheezes, rales or  rhonchi.  Gastrointestinal:  Soft and non tender.  Normal bowel sounds.     Musculoskeletal:  No edema  No tenderness to palpation.  Neurologic:   Orientated x 4.    Psychiatric:   Cooperative.  Appropriate mood & affect.    EKG: February 2015: sinus bradycardia  January 2015: Atrial flutter with variable AV block ST And T abnormalities      Echo February 2015:   normal LVEF, 60%, with concentric LVH and grade I diastolic dysfunction  RVSP: 34 mmHg  No major valvular abnormalities  No prior study for comparison      ASSESSMENT/PLAN     Chest pain. Describes an episode a couple weeks ago after walking up a hill. Discussed in detail today. Will plan stress test    PRUITT. This is a new symptom for her. Will plan follow up echo and stress test     Paroxysmal atrial fibrillation.ChadsVasc3 Anticoagulated on Eliquis. Rate controlled with Metoprolol. Stable    HTN. Stable    Hypothyroidism. Stable    Hyperlipidemia. LDL improved at 71 on atorvastatin 20mg daily    FOLLOW UP: after testing  I, Lou Crawford NP, am acting as a scribe for Dr. Vogel who personally obtained the History of Present Illness, performed the Physical Examination, and formulated the Assessment and Plan.      I have personally performed HPI, ROS, reviewed PMH, medication list, allergy , FH and social history . I have personally performed physical examination with more focus on cardiovascular system. I have formulated the above assessment and plan    Bryson Vogel MD  Interventional Cardiology,  [facility]  Pager: [phone] or [phone]  1/4/2017 ;8:51 AM                   No complaints

## 2020-08-25 NOTE — PROGRESS NOTE ADULT - SUBJECTIVE AND OBJECTIVE BOX
HPI:   26 y/o female pmhx PCOS, asthma, and lumbar HNP 2017 treated w/ conservative managemetn presents to [facility] ED today after being discharged early from OSH with continued LBP radiating to the RLE and now to the LLE. Patient was admitted and worked up for cauda equina at [facility] and treated for L5S1 HNP with LCIHA, steroids, gabapentin and percocet. She was discharged home today but is still unable to walk without assistance. She was scheduled to start outpatient PT but the pain and weakness is worsening. She also reports numbness to the RLE down to the foot now with tingling sensation in the LLE. Denies any recent trauma, falls, denies any recent illness, fever or chills. Denies saddle anesthesia, denies b/b dysfunction.    Hospital Course:  8/15: Patient was admitted and worked up for cauda equina at [facility] and treated for L5-S1 HNP with LICHA, steroids, gabapentin and percocet, transferred here for further workup  8/16: LAZARO overnight. Neuro exam stable.   8/17 LAZARO overnight, Neuro exam stable  8/18: LAZARO overnight. Neuro exam stable. Plan for OR tomorrow   8/19: POD# 0 S/P L5-S1 microdiscectomy. Pt seen and examined at bedside postop. States mild incision site pain. Denies worsening weakness/loss of sensation of extremities.  8/20: LAZARO overnight, neuro stable. Patient c/o LLE weakness and numbness post op overnight   8/21: LAZARO overnight, continued new LLE weakness  8/22: LAZARO overnight. Neuro consulted requested per Dr. Denise for lower extremity weakness/"coldness"  8/23: LAZARO. Overnight. Neurology consulted, labs requested are ordered.   8/24. No events overnight. F/u morning labs for free T4 per Neurology, f/u EMG per Neurology   8/25: POD#6 LAZARO o/n, neuro exam stable.  F/u Neurology recs. PT/OT rec AR, pend placement.     OVERNIGHT EVENTS:  Vital Signs Last 24 Hrs  T(C): 36.7 (24 Aug 2020 20:47), Max: 37 (24 Aug 2020 08:33)  T(F): 98.1 (24 Aug 2020 20:47), Max: 98.6 (24 Aug 2020 08:33)  HR: 66 (24 Aug 2020 20:47) (61 - 74)  BP: 108/67 (24 Aug 2020 20:47) (104/64 - 114/74)  BP(mean): --  RR: 17 (24 Aug 2020 20:47) (15 - 17)  SpO2: 97% (24 Aug 2020 20:47) (96% - 97%)    I&O's Summary      PHYSICAL EXAM:  Gen: NAD  HEENT: PERRL. EOMI b/l  Neck: Supple   Lungs: CTAB  Heart: S1, S2. RRR  Abd: Soft, NT ND  Exts: 2+ pulses throughout  Neuro: AAO x 3.  full 5/5 strength uppers. LLE HF 3/5, knee flex 3/5, DF/PF 4/5. RLE 2/5, knee flex 2/5, DF/PF 4/5    TUBES/LINES:  [] Hendrix  [] Lumbar Drain  [] Wound Drains  [] Others      DIET:  [] NPO  [X] Mechanical  [] Tube feeds    LABS:                        12.7   16.04 )-----------( 403      ( 24 Aug 2020 05:45 )             39.7     08-24    138  |  101  |  21  ----------------------------<  103<H>  4.2   |  27  |  0.64    Ca    8.6      24 Aug 2020 05:45              CAPILLARY BLOOD GLUCOSE          Drug Levels: [] N/A    CSF Analysis: [] N/A      Allergies    No Known Allergies    Intolerances      MEDICATIONS:  Antibiotics:    Neuro:  acetaminophen   Tablet .. 650 milliGRAM(s) Oral every 6 hours PRN  diazepam    Tablet 5 milliGRAM(s) Oral every 8 hours PRN  gabapentin 300 milliGRAM(s) Oral three times a day  HYDROmorphone  Injectable 0.5 milliGRAM(s) IV Push every 3 hours PRN  ondansetron Injectable 4 milliGRAM(s) IV Push every 6 hours PRN  oxycodone    5 mG/acetaminophen 325 mG 1 Tablet(s) Oral every 4 hours PRN  oxycodone    5 mG/acetaminophen 325 mG 2 Tablet(s) Oral every 4 hours PRN    Anticoagulation:  enoxaparin Injectable 40 milliGRAM(s) SubCutaneous at bedtime    OTHER:  ALBUTerol    90 MICROgram(s) HFA Inhaler 2 Puff(s) Inhalation every 6 hours PRN  albuterol/ipratropium for Nebulization. 3 milliLiter(s) Nebulizer every 6 hours PRN  bisacodyl 5 milliGRAM(s) Oral at bedtime  BUpivacaine liposome 1.3% Injectable (no eMAR) 20 milliLiter(s) Local Injection once  dexAMETHasone     Tablet   Oral   dexAMETHasone     Tablet 2 milliGRAM(s) Oral two times a day  dexAMETHasone     Tablet 2 milliGRAM(s) Oral once  pantoprazole    Tablet 40 milliGRAM(s) Oral before breakfast  senna 2 Tablet(s) Oral at bedtime    IVF:  multivitamin 1 Tablet(s) Oral daily    CULTURES:    RADIOLOGY & ADDITIONAL TESTS:  < from: MR Head w/wo IV Cont (08.21.20 @ 23:56) >  IMPRESSION:    1. No acute infarct, mass, or abnormal enhancement.  2. Nonspecific, ovoid foci of T2 prolongation in the frontal and parietal subcortical white matter bilaterally, which is greater than expected for patient age. Differential is long and includes vasculopathy, chronic headache and/or migraine, prior infection/inflammation, and chronic microangiopathic disease if the patient has risk factors such as diabetes, hypertension, and/or smoking,  Pattern and configuration are not typical of demyelinating disease.      < from: MR Cervical Spine w/wo IV Cont (08.21.20 @ 23:51) >  IMPRESSION:    1. Unremarkable study of the cervical spinal cord. No abnormal enhancement.  2. Tiny central disc herniation C5-C6 level with no foraminal stenosis or central canal stenosis.        ASSESSMENT:  26 y/o female with PMHX PCOS, asthma, p/w worsening LBP, a/w RLE weakness, numbness, has L5-S1 HNP, now s/p L5-S1 microdiscectomy (8/19) POD #6.     LUMBAR RADICULOPATHY  No pertinent family history in first degree relatives  Handoff  MEWS Score  Lumbar radiculopathy  PCOS (polycystic ovarian syndrome)  Asthma  Lumbar radiculopathy  PCOS (polycystic ovarian syndrome)  Asthma  Cauda equina syndrome  Lumbar herniated disc  Cauda equina syndrome  Lumbar herniated disc  Lumbar radiculopathy  Other elevated white blood cell (WBC) count  Postoperative state  Preoperative clearance  Mild intermittent asthma without complication  Lumbar radiculopathy  Discectomy, spine, lumbar, 1 level, minimally invasive  No significant past surgical history  BACK PAIN  Lower extremity weakness  [email or web address]      PLAN:    NEURO:  - neuro checks  - vitals checks  - pain control  - decadron taper over 1 week, 4q8  - MRI brain and c-spine w/ and w/o contrast completed   - neurology following for LLE weakness, f/u recs, possible EMG study per Dr. Cisneros     CARDIOVASCULAR:  -normotensive BP goal    PULMONARY:  - on RA    RENAL:  -replete electrolytes prn    GI:  - regular diet  - bowel regimen   - GI ppx: protonix    HEME:  -H&H stable    ID:  -afebrile  -elevated WBCs likely secondary to steroids        DVT PROPHYLAXIS:  [x] Venodynes                                [x] Heparin/Lovenox    DISPOSITION: tele status, full code, dispo pending, PT recs acute rehab  Assessment and plan discussed w/ Dr. Denise     Assessment:  Present when checked    []  GCS  E   V  M     Heart Failure: []Acute, [] acute on chronic , []chronic  Heart Failure:  [] Diastolic (HFpEF), [] Systolic (HFrEF), []Combined (HFpEF and HFrEF), [] RHF, [] Pulm HTN, [] Other    [] ANDREA, [] ATN, [] AIN, [] other  [] CKD1, [] CKD2, [] CKD 3, [] CKD 4, [] CKD 5, []ESRD    Encephalopathy: [] Metabolic, [] Hepatic, [] toxic, [] Neurological, [] Other    Abnormal Nurtitional Status: [] malnurtition (see nutrition note), [ ]underweight: BMI < 19, [] morbid obesity: BMI >40, [] Cachexia    [] Sepsis  [] hypovolemic shock,[] cardiogenic shock, [] hemorrhagic shock, [] neuogenic shock  [] Acute Respiratory Failure  []Cerebral edema, [] Brain compression/ herniation,   [] Functional quadriplegia  [] Acute blood loss anemia

## 2020-08-25 NOTE — BEHAVIORAL HEALTH ASSESSMENT NOTE - NSBHSOCIALHXDETAILSFT_PSY_A_CORE
Ms. Hurley was born in [place] and raised in [place] with both parents ([place]), younger brother and sister. As per EMR, pt was raised as a Jehovah’s Witness. 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 Pt loves to travel.

## 2020-08-25 NOTE — BEHAVIORAL HEALTH ASSESSMENT NOTE - SUICIDE PROTECTIVE FACTORS
Identifies reasons for living/Has future plans/Supportive social network of family or friends/Positive therapeutic relationships/Responsibility to family and others/Engaged in work or school/Fear of death or the actual act of killing self
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
## 2020-08-25 NOTE — BEHAVIORAL HEALTH ASSESSMENT NOTE - DIFFERENTIAL
anxiety d/o unspecified, consider PTSD, ADHD, conversion disorder, and adjustment disorder with anxiety
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
## 2020-08-25 NOTE — BEHAVIORAL HEALTH ASSESSMENT NOTE - NSBHADMITCOUNSEL_PSY_A_CORE
instructions for management, treatment and follow up/risk factor reduction/other.../client/family/caregiver education/prognosis/diagnostic results/impressions and/or recommended studies/risks and benefits of treatment options/importance of adherence to chosen treatment

## 2020-08-26 ENCOUNTER — TRANSCRIPTION ENCOUNTER (OUTPATIENT)
Age: 25
End: 2020-08-26

## 2020-08-26 LAB
ANION GAP SERPL CALC-SCNC: 9 MMOL/L — SIGNIFICANT CHANGE UP (ref 5–17)
BUN SERPL-MCNC: 19 MG/DL — SIGNIFICANT CHANGE UP (ref 7–23)
CALCIUM SERPL-MCNC: 8.8 MG/DL — SIGNIFICANT CHANGE UP (ref 8.4–10.5)
CHLORIDE SERPL-SCNC: 100 MMOL/L — SIGNIFICANT CHANGE UP (ref 96–108)
CO2 SERPL-SCNC: 28 MMOL/L — SIGNIFICANT CHANGE UP (ref 22–31)
CREAT SERPL-MCNC: 0.59 MG/DL — SIGNIFICANT CHANGE UP (ref 0.5–1.3)
GLUCOSE SERPL-MCNC: 90 MG/DL — SIGNIFICANT CHANGE UP (ref 70–99)
HCT VFR BLD CALC: 41 % — SIGNIFICANT CHANGE UP (ref 34.5–45)
HGB BLD-MCNC: 13.1 G/DL — SIGNIFICANT CHANGE UP (ref 11.5–15.5)
MCHC RBC-ENTMCNC: 28.3 PG — SIGNIFICANT CHANGE UP (ref 27–34)
MCHC RBC-ENTMCNC: 32 GM/DL — SIGNIFICANT CHANGE UP (ref 32–36)
MCV RBC AUTO: 88.6 FL — SIGNIFICANT CHANGE UP (ref 80–100)
NRBC # BLD: 0 /100 WBCS — SIGNIFICANT CHANGE UP (ref 0–0)
PLATELET # BLD AUTO: 223 K/UL — SIGNIFICANT CHANGE UP (ref 150–400)
POTASSIUM SERPL-MCNC: 4.5 MMOL/L — SIGNIFICANT CHANGE UP (ref 3.5–5.3)
POTASSIUM SERPL-SCNC: 4.5 MMOL/L — SIGNIFICANT CHANGE UP (ref 3.5–5.3)
RBC # BLD: 4.63 M/UL — SIGNIFICANT CHANGE UP (ref 3.8–5.2)
RBC # FLD: 13.9 % — SIGNIFICANT CHANGE UP (ref 10.3–14.5)
SODIUM SERPL-SCNC: 137 MMOL/L — SIGNIFICANT CHANGE UP (ref 135–145)
WBC # BLD: 15.11 K/UL — HIGH (ref 3.8–10.5)
WBC # FLD AUTO: 15.11 K/UL — HIGH (ref 3.8–10.5)

## 2020-08-26 PROCEDURE — 99024 POSTOP FOLLOW-UP VISIT: CPT

## 2020-08-26 RX ORDER — DIAZEPAM 5 MG
5 TABLET ORAL EVERY 8 HOURS
Refills: 0 | Status: DISCONTINUED | OUTPATIENT
Start: 2020-08-26 | End: 2020-08-31

## 2020-08-26 RX ORDER — OXYCODONE AND ACETAMINOPHEN 5; 325 MG/1; MG/1
1 TABLET ORAL EVERY 4 HOURS
Refills: 0 | Status: DISCONTINUED | OUTPATIENT
Start: 2020-08-26 | End: 2020-08-31

## 2020-08-26 RX ORDER — LACTULOSE 10 G/15ML
10 SOLUTION ORAL DAILY
Refills: 0 | Status: DISCONTINUED | OUTPATIENT
Start: 2020-08-26 | End: 2020-08-31

## 2020-08-26 RX ORDER — OXYCODONE AND ACETAMINOPHEN 5; 325 MG/1; MG/1
2 TABLET ORAL EVERY 4 HOURS
Refills: 0 | Status: DISCONTINUED | OUTPATIENT
Start: 2020-08-26 | End: 2020-08-31

## 2020-08-26 RX ORDER — HYDROMORPHONE HYDROCHLORIDE 2 MG/ML
0.5 INJECTION INTRAMUSCULAR; INTRAVENOUS; SUBCUTANEOUS
Refills: 0 | Status: DISCONTINUED | OUTPATIENT
Start: 2020-08-26 | End: 2020-08-31

## 2020-08-26 RX ADMIN — GABAPENTIN 300 MILLIGRAM(S): 400 CAPSULE ORAL at 22:24

## 2020-08-26 RX ADMIN — HYDROMORPHONE HYDROCHLORIDE 0.5 MILLIGRAM(S): 2 INJECTION INTRAMUSCULAR; INTRAVENOUS; SUBCUTANEOUS at 18:59

## 2020-08-26 RX ADMIN — Medication 5 MILLIGRAM(S): at 13:22

## 2020-08-26 RX ADMIN — HYDROMORPHONE HYDROCHLORIDE 0.5 MILLIGRAM(S): 2 INJECTION INTRAMUSCULAR; INTRAVENOUS; SUBCUTANEOUS at 05:51

## 2020-08-26 RX ADMIN — Medication 2 MILLIGRAM(S): at 05:56

## 2020-08-26 RX ADMIN — HYDROMORPHONE HYDROCHLORIDE 0.5 MILLIGRAM(S): 2 INJECTION INTRAMUSCULAR; INTRAVENOUS; SUBCUTANEOUS at 06:15

## 2020-08-26 RX ADMIN — Medication 1 TABLET(S): at 13:20

## 2020-08-26 RX ADMIN — PANTOPRAZOLE SODIUM 40 MILLIGRAM(S): 20 TABLET, DELAYED RELEASE ORAL at 05:55

## 2020-08-26 RX ADMIN — GABAPENTIN 300 MILLIGRAM(S): 400 CAPSULE ORAL at 05:55

## 2020-08-26 RX ADMIN — OXYCODONE AND ACETAMINOPHEN 2 TABLET(S): 5; 325 TABLET ORAL at 17:13

## 2020-08-26 RX ADMIN — OXYCODONE AND ACETAMINOPHEN 2 TABLET(S): 5; 325 TABLET ORAL at 02:34

## 2020-08-26 RX ADMIN — OXYCODONE AND ACETAMINOPHEN 2 TABLET(S): 5; 325 TABLET ORAL at 11:00

## 2020-08-26 RX ADMIN — OXYCODONE AND ACETAMINOPHEN 2 TABLET(S): 5; 325 TABLET ORAL at 17:45

## 2020-08-26 RX ADMIN — ONDANSETRON 4 MILLIGRAM(S): 8 TABLET, FILM COATED ORAL at 06:00

## 2020-08-26 RX ADMIN — OXYCODONE AND ACETAMINOPHEN 2 TABLET(S): 5; 325 TABLET ORAL at 03:34

## 2020-08-26 RX ADMIN — GABAPENTIN 300 MILLIGRAM(S): 400 CAPSULE ORAL at 13:20

## 2020-08-26 RX ADMIN — HYDROMORPHONE HYDROCHLORIDE 0.5 MILLIGRAM(S): 2 INJECTION INTRAMUSCULAR; INTRAVENOUS; SUBCUTANEOUS at 18:43

## 2020-08-26 RX ADMIN — ONDANSETRON 4 MILLIGRAM(S): 8 TABLET, FILM COATED ORAL at 13:24

## 2020-08-26 RX ADMIN — OXYCODONE AND ACETAMINOPHEN 2 TABLET(S): 5; 325 TABLET ORAL at 10:24

## 2020-08-26 RX ADMIN — Medication 5 MILLIGRAM(S): at 22:24

## 2020-08-26 RX ADMIN — LACTULOSE 10 GRAM(S): 10 SOLUTION ORAL at 15:46

## 2020-08-26 RX ADMIN — OXYCODONE AND ACETAMINOPHEN 2 TABLET(S): 5; 325 TABLET ORAL at 23:23

## 2020-08-26 RX ADMIN — SENNA PLUS 2 TABLET(S): 8.6 TABLET ORAL at 22:24

## 2020-08-26 RX ADMIN — OXYCODONE AND ACETAMINOPHEN 2 TABLET(S): 5; 325 TABLET ORAL at 22:23

## 2020-08-26 RX ADMIN — ENOXAPARIN SODIUM 40 MILLIGRAM(S): 100 INJECTION SUBCUTANEOUS at 22:24

## 2020-08-26 NOTE — DISCHARGE NOTE PROVIDER - CARE PROVIDER_API CALL
Sanya Denise; MS)  Neurosurgery  [address] [place] [address]  Phone: [phone]  Fax: [phone]  Follow Up Time:

## 2020-08-26 NOTE — DISCHARGE NOTE PROVIDER - NSDCACTIVITY_GEN_ALL_CORE
Walking - Indoors allowed/No heavy lifting/straining/Do not drive or operate machinery/Walking - Outdoors allowed/Do not make important decisions/Stairs allowed

## 2020-08-26 NOTE — DISCHARGE NOTE PROVIDER - HOSPITAL COURSE
Hospital Course:    8/15: Patient was admitted and worked up for cauda equina at [facility] and treated for L5-S1 HNP with LICHA, steroids, gabapentin and percocet, transferred here for further workup    8/16: LAZARO overnight. Neuro exam stable.     8/17 LAZARO overnight, Neuro exam stable    8/18: LAZARO overnight. Neuro exam stable. Plan for OR tomorrow     8/19: POD# 0 S/P L5-S1 microdiscectomy. Pt seen and examined at bedside postop. States mild incision site pain. Denies worsening weakness/loss of sensation of extremities.    8/20: LAZARO overnight, neuro stable. Patient c/o LLE weakness and numbness post op overnight     8/21: LAZARO overnight, continued new LLE weakness    8/22: LAZARO overnight. Neuro consulted requested per Dr. Denise for lower extremity weakness/"coldness"    8/23: LAZARO. Overnight. Neurology consulted, labs requested are ordered.     8/24. No events overnight. F/u morning labs for free T4 per Neurology, f/u EMG per Neurology     8/25: POD#6 LAZARO o/n, neuro exam stable.  F/u Neurology recs. PT/OT rec AR, pend placement.     8/26: POD#7 LAZARO o/n, neuro exam stable. Outpt appt w/ Neurology and Psych recommended. [facility]         Patient is medically and neurologically stable and optimized for discharge to acute rehab facility. Hospital Course:    8/15: Patient was admitted and worked up for cauda equina at [facility] and treated for L5-S1 HNP with LICHA, steroids, gabapentin and percocet, transferred here for further workup    8/16: LAZARO overnight. Neuro exam stable.     8/17 LAZARO overnight, Neuro exam stable    8/18: LAZARO overnight. Neuro exam stable. Plan for OR tomorrow     8/19: POD# 0 S/P L5-S1 microdiscectomy. Pt seen and examined at bedside postop. States mild incision site pain. Denies worsening weakness/loss of sensation of extremities.    8/20: LAZARO overnight, neuro stable. Patient c/o LLE weakness and numbness post op overnight     8/21: LAZARO overnight, continued new LLE weakness    8/22: LAZARO overnight. Neuro consulted requested per Dr. Denise for lower extremity weakness/"coldness"    8/23: LAZARO. Overnight. Neurology consulted, labs requested are ordered.     8/24. No events overnight. F/u morning labs for free T4 per Neurology, f/u EMG per Neurology     8/25: POD#6 LAZARO o/n, neuro exam stable.  F/u Neurology recs. PT/OT rec AR, pend placement.     8/26: POD#7 LAZARO o/n, neuro exam stable. Outpt appt w/ Neurology and Psych recommended. [facility].     8/27: LAZARO overnight, neuro stable. Patient is participating in PT and eagerly awaiting rehab.  She still reports left leg weakness and is eager to improve.  Denies dizziness.  Denies new neurologic complaints.    8/28: pending AR    8/29 -8/31 Uneventful , pain is controlled            Patient is medically and neurologically stable and optimized for discharge to acute rehab facility.

## 2020-08-26 NOTE — DISCHARGE NOTE PROVIDER - NSDCCPCAREPLAN_GEN_ALL_CORE_FT
PRINCIPAL DISCHARGE DIAGNOSIS  Diagnosis: Lumbar radiculopathy  Assessment and Plan of Treatment: 2017      SECONDARY DISCHARGE DIAGNOSES  Diagnosis: Lower extremity weakness  Assessment and Plan of Treatment:

## 2020-08-26 NOTE — PROGRESS NOTE ADULT - SUBJECTIVE AND OBJECTIVE BOX
HPI:  26 y/o female pmhx PCOS, asthma, and lumbar HNP 2017 treated w/ conservative managemetn presents to [facility] ED today after being discharged early from OSH with continued LBP radiating to the RLE and now to the LLE. Patient was admitted and worked up for cauda equina at [facility] and treated for L5S1 HNP with LICHA, steroids, gabapentin and percocet. She was discharged home today but is still unable to walk without assistance. She was scheduled to start outpatient PT but the pain and weakness is worsening. She also reports numbness to the RLE down to the foot now with tingling sensation in the LLE. Denies any recent trauma, falls, denies any recent illness, fever or chills. Denies saddle anesthesia, denies b/b dysfunction.    OVERNIGHT EVENTS: LAZARO o/n, neuro exam stable, strength b/l LE stable from previous exam.     Hospital Course:  8/15: Patient was admitted and worked up for cauda equina at [facility] and treated for L5-S1 HNP with LICHA, steroids, gabapentin and percocet, transferred here for further workup  8/16: LAZARO overnight. Neuro exam stable.   8/17 LAZARO overnight, Neuro exam stable  8/18: LAZARO overnight. Neuro exam stable. Plan for OR tomorrow   8/19: POD# 0 S/P L5-S1 microdiscectomy. Pt seen and examined at bedside postop. States mild incision site pain. Denies worsening weakness/loss of sensation of extremities.  8/20: LAZARO overnight, neuro stable. Patient c/o LLE weakness and numbness post op overnight   8/21: LAZARO overnight, continued new LLE weakness  8/22: LAZARO overnight. Neuro consulted requested per Dr. Denise for lower extremity weakness/"coldness"  8/23: LAZARO. Overnight. Neurology consulted, labs requested are ordered.   8/24. No events overnight. F/u morning labs for free T4 per Neurology, f/u EMG per Neurology   8/25: POD#6 LAZARO o/n, neuro exam stable.  F/u Neurology recs. PT/OT rec AR, pend placement.   8/26: POD#7 Lazaro o/n, neuro exam stable. Neurology and psych rec outpt follow-up. PT/OT recs AR, pend insurance auth at [facility]. EMG to be done as outpt.       OVERNIGHT EVENTS:  Vital Signs Last 24 Hrs  T(C): 36.9 (25 Aug 2020 21:31), Max: 36.9 (25 Aug 2020 08:42)  T(F): 98.4 (25 Aug 2020 21:31), Max: 98.5 (25 Aug 2020 08:42)  HR: 88 (25 Aug 2020 21:31) (66 - 94)  BP: 104/68 (25 Aug 2020 21:31) (100/67 - 129/80)  BP(mean): --  RR: 16 (25 Aug 2020 21:31) (14 - 16)  SpO2: 97% (25 Aug 2020 21:31) (97% - 99%)    I&O's Summary    25 Aug 2020 07:01  -  26 Aug 2020 04:45  --------------------------------------------------------  IN: 240 mL / OUT: 300 mL / NET: -60 mL        PHYSICAL EXAM:  Gen: NAD  HEENT: PERRL. EOMI b/l  Neck: Supple   Lungs: CTAB  Heart: S1, S2. RRR  Abd: Soft, NT ND  Exts: 2+ pulses throughout  Neuro: AAO x 3.  full 5/5 strength uppers. LLE HF 3-4/5, knee flex 3-4/5, DF/PF 4/5. RLE 3-4/5, knee flex 3-4/5, DF/PF 4/5    TUBES/LINES:  [] Hendrix  [] Lumbar Drain  [] Wound Drains  [] Others      DIET:  [] NPO  [X] Mechanical  [] Tube feeds    LABS:                        13.5   14.96 )-----------( 358      ( 25 Aug 2020 07:09 )             41.6     08-25    137  |  99  |  19  ----------------------------<  92  4.4   |  29  |  0.68    Ca    8.9      25 Aug 2020 07:09              CAPILLARY BLOOD GLUCOSE          Drug Levels: [] N/A    CSF Analysis: [] N/A      Allergies    No Known Allergies    Intolerances      MEDICATIONS:  Antibiotics:    Neuro:  acetaminophen   Tablet .. 650 milliGRAM(s) Oral every 6 hours PRN  diazepam    Tablet 5 milliGRAM(s) Oral every 8 hours PRN  gabapentin 300 milliGRAM(s) Oral three times a day  HYDROmorphone  Injectable 0.5 milliGRAM(s) IV Push every 3 hours PRN  ondansetron Injectable 4 milliGRAM(s) IV Push every 6 hours PRN  oxycodone    5 mG/acetaminophen 325 mG 1 Tablet(s) Oral every 4 hours PRN  oxycodone    5 mG/acetaminophen 325 mG 2 Tablet(s) Oral every 4 hours PRN    Anticoagulation:  enoxaparin Injectable 40 milliGRAM(s) SubCutaneous at bedtime    OTHER:  ALBUTerol    90 MICROgram(s) HFA Inhaler 2 Puff(s) Inhalation every 6 hours PRN  albuterol/ipratropium for Nebulization. 3 milliLiter(s) Nebulizer every 6 hours PRN  bisacodyl 5 milliGRAM(s) Oral at bedtime  BUpivacaine liposome 1.3% Injectable (no eMAR) 20 milliLiter(s) Local Injection once  dexAMETHasone     Tablet   Oral   dexAMETHasone     Tablet 2 milliGRAM(s) Oral once  pantoprazole    Tablet 40 milliGRAM(s) Oral before breakfast  senna 2 Tablet(s) Oral at bedtime    IVF:  multivitamin 1 Tablet(s) Oral daily    CULTURES:    RADIOLOGY & ADDITIONAL TESTS:      ASSESSMENT:  26 y/o female with PMHX PCOS, asthma, p/w worsening LBP, a/w RLE weakness, numbness, has L5-S1 HNP, now POD#7 s/p L5-S1 microdiscectomy (8/19).     LUMBAR RADICULOPATHY  No pertinent family history in first degree relatives  Handoff  MEWS Score  Lumbar radiculopathy  PCOS (polycystic ovarian syndrome)  Asthma  Lumbar radiculopathy  PCOS (polycystic ovarian syndrome)  Asthma  Cauda equina syndrome  Lumbar herniated disc  Cauda equina syndrome  Lumbar herniated disc  Discectomy, spine, lumbar, 1 level, minimally invasive  Laminectomy, spine, lumbar, 1 level, with discectomy  Lumbar radiculopathy  Anxiety disorder, unspecified type  Other elevated white blood cell (WBC) count  Postoperative state  Preoperative clearance  Mild intermittent asthma without complication  Lumbar radiculopathy  Discectomy, spine, lumbar, 1 level, minimally invasive  No significant past surgical history  BACK PAIN  Lower extremity weakness  [email or web address]      PLAN:    NEURO:  - neuro checks  - vitals checks  - pain control  - decadron taper over 1 week, 4q8  - MRI brain and c-spine w/ and w/o contrast completed   - neurology following for LLE weakness, f/u recs, EMG study can be done outpt if pt's LLE weakness persists after rehab per Neurology, pt will need outpt f/u appt w/ neurology   - Psych outpt follow-up appt rec, consider starting SSRI for anxiety     CARDIOVASCULAR:  -normotensive BP goal    PULMONARY:  - on RA    RENAL:  -replete electrolytes prn    GI:  - regular diet  - bowel regimen   - GI ppx: protonix    HEME:  -H&H stable    ID:  -afebrile  -elevated WBCs likely secondary to steroids        DVT PROPHYLAXIS:  [x] Venodynes                                [x] Heparin/Lovenox    DISPOSITION: tele status, full code, dispo pending, PT recs acute rehab pend insurance auth at [place] rehab   Assessment and plan discussed w/ Dr. Denise     Assessment:  Present when checked    []  GCS  E   V  M     Heart Failure: []Acute, [] acute on chronic , []chronic  Heart Failure:  [] Diastolic (HFpEF), [] Systolic (HFrEF), []Combined (HFpEF and HFrEF), [] RHF, [] Pulm HTN, [] Other    [] ANDREA, [] ATN, [] AIN, [] other  [] CKD1, [] CKD2, [] CKD 3, [] CKD 4, [] CKD 5, []ESRD    Encephalopathy: [] Metabolic, [] Hepatic, [] toxic, [] Neurological, [] Other    Abnormal Nurtitional Status: [] malnurtition (see nutrition note), [ ]underweight: BMI < 19, [] morbid obesity: BMI >40, [] Cachexia    [] Sepsis  [] hypovolemic shock,[] cardiogenic shock, [] hemorrhagic shock, [] neuogenic shock  [] Acute Respiratory Failure  []Cerebral edema, [] Brain compression/ herniation,   [] Functional quadriplegia  [] Acute blood loss anemia

## 2020-08-26 NOTE — DISCHARGE NOTE PROVIDER - NSDCMRMEDTOKEN_GEN_ALL_CORE_FT
albuterol 90 mcg/inh inhalation aerosol: 2 puff(s) inhaled every 6 hours, As needed, Shortness of Breath and/or Wheezing  bisacodyl 5 mg oral delayed release tablet: 1 tab(s) orally once a day (at bedtime)  diazePAM 5 mg oral tablet: 1 tab(s) orally every 8 hours, As needed, muscle spasm  enoxaparin: 40 milligram(s) subcutaneous once a day (at bedtime)  gabapentin 300 mg oral capsule: 1 cap(s) orally 3 times a day  ipratropium-albuterol 0.5 mg-2.5 mg/3 mLinhalation solution: 3 milliliter(s) inhaled every 6 hours, As needed, Shortness of Breath and/or Wheezing  Multiple Vitamins oral tablet: 1 tab(s) orally once a day  oxycodone-acetaminophen 5 mg-325 mg oral tablet: 1 tab(s) orally every 4 hours, As needed, Moderate Pain (4 - 6)  oxycodone-acetaminophen 5 mg-325 mg oral tablet: 2 tab(s) orally every 4 hours, As needed, Severe Pain (7 - 10)  senna oral tablet: 2 tab(s) orally once a day (at bedtime)

## 2020-08-26 NOTE — DISCHARGE NOTE PROVIDER - NSDCCPTREATMENT_GEN_ALL_CORE_FT
PRINCIPAL PROCEDURE  Procedure: Discectomy, spine, lumbar, 1 level, minimally invasive  Findings and Treatment:

## 2020-08-26 NOTE — DISCHARGE NOTE PROVIDER - NSDCFUADDINST_GEN_ALL_CORE_FT
After discharge: make a 2 week follow up appointment after surgery date  - remove surgical dressing on the third day after surgery if you have not done so already. You can shower on this day.  replace the dressing daily until you are seen for a wound check.   Once a day, ask a family or staff member to check your incision for signs of infection such as: Redness, Swelling and tenderness,  Drainage  • You may use stairs as tolerated.   No tub baths or swimming for two weeks.   Take pain medicine as prescribed.   You may find it helpful to take it for morning stiffness or for soreness when trying to sleep.  o Pain medication can cause constipation. Eating food with fiber such as fruits and  vegetables, and drinking liquids may help prevent constipation.  • Avoid bending, twisting or heavy lifting.  • Do not sit for more than 20 minutes each time you sit.  • Do not wear pants that are tight on your incision.  Call you doctor immediately if you have:  • Any new numbness, tingling, or weakness in your arms and legs.  Worsening pain not responsive to pain meds, Fever of 101° F or more.  To make your appointment for any questions, please call: [phone].

## 2020-08-27 LAB
ALBUMIN SERPL ELPH-MCNC: 3.8 G/DL — SIGNIFICANT CHANGE UP (ref 3.3–5)
ALP SERPL-CCNC: 60 U/L — SIGNIFICANT CHANGE UP (ref 40–120)
ALT FLD-CCNC: 22 U/L — SIGNIFICANT CHANGE UP (ref 10–45)
ANION GAP SERPL CALC-SCNC: 9 MMOL/L — SIGNIFICANT CHANGE UP (ref 5–17)
AST SERPL-CCNC: 15 U/L — SIGNIFICANT CHANGE UP (ref 10–40)
BILIRUB SERPL-MCNC: 0.5 MG/DL — SIGNIFICANT CHANGE UP (ref 0.2–1.2)
BUN SERPL-MCNC: 17 MG/DL — SIGNIFICANT CHANGE UP (ref 7–23)
CALCIUM SERPL-MCNC: 8.9 MG/DL — SIGNIFICANT CHANGE UP (ref 8.4–10.5)
CHLORIDE SERPL-SCNC: 99 MMOL/L — SIGNIFICANT CHANGE UP (ref 96–108)
CK SERPL-CCNC: 27 U/L — SIGNIFICANT CHANGE UP (ref 25–170)
CO2 SERPL-SCNC: 31 MMOL/L — SIGNIFICANT CHANGE UP (ref 22–31)
CREAT SERPL-MCNC: 0.67 MG/DL — SIGNIFICANT CHANGE UP (ref 0.5–1.3)
GLUCOSE SERPL-MCNC: 82 MG/DL — SIGNIFICANT CHANGE UP (ref 70–99)
HCT VFR BLD CALC: 42.5 % — SIGNIFICANT CHANGE UP (ref 34.5–45)
HGB BLD-MCNC: 13.5 G/DL — SIGNIFICANT CHANGE UP (ref 11.5–15.5)
MAGNESIUM SERPL-MCNC: 2.2 MG/DL — SIGNIFICANT CHANGE UP (ref 1.6–2.6)
MCHC RBC-ENTMCNC: 28.1 PG — SIGNIFICANT CHANGE UP (ref 27–34)
MCHC RBC-ENTMCNC: 31.8 GM/DL — LOW (ref 32–36)
MCV RBC AUTO: 88.5 FL — SIGNIFICANT CHANGE UP (ref 80–100)
NRBC # BLD: 0 /100 WBCS — SIGNIFICANT CHANGE UP (ref 0–0)
PHOSPHATE SERPL-MCNC: 4 MG/DL — SIGNIFICANT CHANGE UP (ref 2.5–4.5)
PLATELET # BLD AUTO: 355 K/UL — SIGNIFICANT CHANGE UP (ref 150–400)
POTASSIUM SERPL-MCNC: 4.4 MMOL/L — SIGNIFICANT CHANGE UP (ref 3.5–5.3)
POTASSIUM SERPL-SCNC: 4.4 MMOL/L — SIGNIFICANT CHANGE UP (ref 3.5–5.3)
PROT SERPL-MCNC: 6.5 G/DL — SIGNIFICANT CHANGE UP (ref 6–8.3)
RBC # BLD: 4.8 M/UL — SIGNIFICANT CHANGE UP (ref 3.8–5.2)
RBC # FLD: 14.1 % — SIGNIFICANT CHANGE UP (ref 10.3–14.5)
SODIUM SERPL-SCNC: 139 MMOL/L — SIGNIFICANT CHANGE UP (ref 135–145)
WBC # BLD: 16.17 K/UL — HIGH (ref 3.8–10.5)
WBC # FLD AUTO: 16.17 K/UL — HIGH (ref 3.8–10.5)

## 2020-08-27 PROCEDURE — 99232 SBSQ HOSP IP/OBS MODERATE 35: CPT | Mod: GC

## 2020-08-27 PROCEDURE — 99024 POSTOP FOLLOW-UP VISIT: CPT

## 2020-08-27 RX ADMIN — GABAPENTIN 300 MILLIGRAM(S): 400 CAPSULE ORAL at 12:22

## 2020-08-27 RX ADMIN — HYDROMORPHONE HYDROCHLORIDE 0.5 MILLIGRAM(S): 2 INJECTION INTRAMUSCULAR; INTRAVENOUS; SUBCUTANEOUS at 07:53

## 2020-08-27 RX ADMIN — Medication 5 MILLIGRAM(S): at 04:13

## 2020-08-27 RX ADMIN — OXYCODONE AND ACETAMINOPHEN 2 TABLET(S): 5; 325 TABLET ORAL at 19:30

## 2020-08-27 RX ADMIN — OXYCODONE AND ACETAMINOPHEN 2 TABLET(S): 5; 325 TABLET ORAL at 11:00

## 2020-08-27 RX ADMIN — ENOXAPARIN SODIUM 40 MILLIGRAM(S): 100 INJECTION SUBCUTANEOUS at 21:11

## 2020-08-27 RX ADMIN — OXYCODONE AND ACETAMINOPHEN 2 TABLET(S): 5; 325 TABLET ORAL at 15:02

## 2020-08-27 RX ADMIN — SENNA PLUS 2 TABLET(S): 8.6 TABLET ORAL at 21:11

## 2020-08-27 RX ADMIN — Medication 5 MILLIGRAM(S): at 21:11

## 2020-08-27 RX ADMIN — LACTULOSE 10 GRAM(S): 10 SOLUTION ORAL at 06:41

## 2020-08-27 RX ADMIN — HYDROMORPHONE HYDROCHLORIDE 0.5 MILLIGRAM(S): 2 INJECTION INTRAMUSCULAR; INTRAVENOUS; SUBCUTANEOUS at 01:46

## 2020-08-27 RX ADMIN — OXYCODONE AND ACETAMINOPHEN 2 TABLET(S): 5; 325 TABLET ORAL at 20:30

## 2020-08-27 RX ADMIN — ONDANSETRON 4 MILLIGRAM(S): 8 TABLET, FILM COATED ORAL at 15:02

## 2020-08-27 RX ADMIN — OXYCODONE AND ACETAMINOPHEN 2 TABLET(S): 5; 325 TABLET ORAL at 15:35

## 2020-08-27 RX ADMIN — Medication 1 TABLET(S): at 12:22

## 2020-08-27 RX ADMIN — GABAPENTIN 300 MILLIGRAM(S): 400 CAPSULE ORAL at 21:11

## 2020-08-27 RX ADMIN — HYDROMORPHONE HYDROCHLORIDE 0.5 MILLIGRAM(S): 2 INJECTION INTRAMUSCULAR; INTRAVENOUS; SUBCUTANEOUS at 02:01

## 2020-08-27 RX ADMIN — GABAPENTIN 300 MILLIGRAM(S): 400 CAPSULE ORAL at 06:42

## 2020-08-27 RX ADMIN — ONDANSETRON 4 MILLIGRAM(S): 8 TABLET, FILM COATED ORAL at 07:54

## 2020-08-27 RX ADMIN — Medication 5 MILLIGRAM(S): at 17:20

## 2020-08-27 RX ADMIN — OXYCODONE AND ACETAMINOPHEN 2 TABLET(S): 5; 325 TABLET ORAL at 11:31

## 2020-08-27 RX ADMIN — OXYCODONE AND ACETAMINOPHEN 2 TABLET(S): 5; 325 TABLET ORAL at 23:30

## 2020-08-27 NOTE — PROGRESS NOTE ADULT - SUBJECTIVE AND OBJECTIVE BOX
Interval Events: Reviewed  Patient seen and examined at bedside.    Patient is a 25y old  Female who presents with a chief complaint of worsening LBP pain/right leg weakness. (27 Aug 2020 01:54)  she is about the same but the right leg is stronger    PAST MEDICAL & SURGICAL HISTORY:  Lumbar radiculopathy: 2017  PCOS (polycystic ovarian syndrome)  Asthma  No significant past surgical history      MEDICATIONS:  Pulmonary:  ALBUTerol    90 MICROgram(s) HFA Inhaler 2 Puff(s) Inhalation every 6 hours PRN  albuterol/ipratropium for Nebulization. 3 milliLiter(s) Nebulizer every 6 hours PRN    Antimicrobials:    Anticoagulants:  enoxaparin Injectable 40 milliGRAM(s) SubCutaneous at bedtime    Cardiac:      Allergies    No Known Allergies    Intolerances        Vital Signs Last 24 Hrs  T(C): 36.7 (27 Aug 2020 04:41), Max: 36.7 (26 Aug 2020 20:18)  T(F): 98 (27 Aug 2020 04:41), Max: 98.1 (26 Aug 2020 20:18)  HR: 77 (27 Aug 2020 04:41) (72 - 89)  BP: 98/64 (27 Aug 2020 04:41) (95/60 - 139/84)  BP(mean): --  RR: 17 (27 Aug 2020 04:41) (15 - 17)  SpO2: 98% (27 Aug 2020 04:41) (95% - 98%)        Review of Systems:   •	General: negative  •	Skin/Breast: negative  •	Ophthalmologic: negative  •	ENMT: negative  •	Respiratory and Thorax: negative  •	Cardiovascular: negative  •	Gastrointestinal: negative  •	Genitourinary: negative  •	Musculoskeletal: negative  •	Neurological: negative  •	Psychiatric: negative  •	Hematology/Lymphatics: negative  •	Endocrine: negative  •	Allergic/Immunologic: negative    Physical Exam:   • Constitutional:	Well-developed, well nourished  • Eyes:	EOMI; PERRL; no drainage or redness  • ENMT:	No oral lesions; no gross abnormalities  • Neck	No bruits; no thyromegaly or nodules  • Breasts:	not examined  • Back:	No deformity or limitation of movement  • Respiratory:	Breath Sounds equal & clear to percussion & auscultation, no accessory muscle use  • Cardiovascular:	Regular rate & rhythm, normal S1, S2; no murmurs, gallops or rubs; no S3, S4  • Gastrointestinal:	Soft, non-tender, no hepatosplenomegaly, normal bowel sounds  • Genitourinary:	not examined  • Rectal: not examined  • Extremities:	No cyanosis, clubbing or edema  • Vascular:	Equal and normal pulses (carotid, femoral, dorsalis pedis)  • Neurologica:l	not examined  • Skin:	No lesions; no rash  • Lymph Nodes:	No lymphadedenopathy  • Musculoskeletal:	No joint pain, swelling or deformity; no limitation of movement        LABS:      CBC Full  -  ( 27 Aug 2020 07:08 )  WBC Count : 16.17 K/uL  RBC Count : 4.80 M/uL  Hemoglobin : 13.5 g/dL  Hematocrit : 42.5 %  Platelet Count - Automated : 355 K/uL  Mean Cell Volume : 88.5 fl  Mean Cell Hemoglobin : 28.1 pg  Mean Cell Hemoglobin Concentration : 31.8 gm/dL  Auto Neutrophil # : x  Auto Lymphocyte # : x  Auto Monocyte # : x  Auto Eosinophil # : x  Auto Basophil # : x  Auto Neutrophil % : x  Auto Lymphocyte % : x  Auto Monocyte % : x  Auto Eosinophil % : x  Auto Basophil % : x    08-27    139  |  99  |  17  ----------------------------<  82  4.4   |  31  |  0.67    Ca    8.9      27 Aug 2020 07:08  Phos  4.0     08-27  Mg     2.2     08-27    TPro  6.5  /  Alb  3.8  /  TBili  0.5  /  DBili  x   /  AST  15  /  ALT  22  /  AlkPhos  60  08-27                        RADIOLOGY & ADDITIONAL STUDIES (The following images were personally reviewed):  Hendrix:                                     No  Urine output:                       adequate  DVT prophylaxis:                 Yes  Flattus:                                  Yes  Bowel movement:              No

## 2020-08-27 NOTE — PROGRESS NOTE ADULT - PROBLEM SELECTOR PLAN 3
The patient is hemodynamically stable.  The pain is controlled.  Patient is on DVT prophylaxis.  Patient is using incentive spirometry.  Oxygen saturation is acceptable.  Advance diet as tolerated.  Advance activity as tolerated.  Monitor for ileus.  Patient is on Laxatives.  Surgical wound is stable.  No indication for monitor bed.
she had Abt for covid.  CXR is clear and she is optimized for surgery

## 2020-08-27 NOTE — PROGRESS NOTE ADULT - PROBLEM SELECTOR PROBLEM 4
Other elevated white blood cell (WBC) count

## 2020-08-27 NOTE — PROGRESS NOTE ADULT - PROBLEM SELECTOR PLAN 4
related to steroids
off steroids and no evidence of infection
related to steroids
related to steroids

## 2020-08-27 NOTE — PROGRESS NOTE ADULT - PROBLEM SELECTOR PLAN 1
she I sscheduled for surgery tomorrow
she is still has parasthenia in the lower extremities.
she is still has parasthenia in the lower extremities.  She is on decadron and follow with NS and she is for MRI
she is still has parasthesia in the loiwer extremities.  She is on decadron and follow with NS
she is still has parasthenia in the lower extremities.  She is on decadron and follow with NS

## 2020-08-27 NOTE — PROGRESS NOTE ADULT - ATTENDING COMMENTS
Exam is consistent with incomplete / variable effort   Numbness is not in any nerve or nerve root distribution   MRI brain, C and T spine unremarkable (few small white matter lesions in the brain likely due to history of migraines and not related to current symptoms)   NCS/EMG not likely to be helpful at this time, especially as she reports change in symptoms only 4-5 days ago   If symptoms persist after d/c from rehab she can see me in the office for outpatient EMG  Will sign off, call back with further questions
Patient seen and examined with house-staff during bedside rounds.  Resident note read, including vitals, physical findings, laboratory data, and radiological reports.   Revisions included below.  Direct personal management at bed side and extensive interpretation of the data.  Plan was outlined and discussed in details with the housestaff.  Decision making of high complexity  Action taken for acute disease activity to reflect the level of care provided:  - medication reconciliation  - review laboratory data

## 2020-08-27 NOTE — PROGRESS NOTE ADULT - PROBLEM SELECTOR PLAN 2
proventil as needed
No

## 2020-08-27 NOTE — CHART NOTE - NSCHARTNOTEFT_GEN_A_CORE
Admitting Diagnosis:   Patient is a 25y old  Female who presents with a chief complaint of worsening LBP pain/right leg weakness. (27 Aug 2020 08:07)    PAST MEDICAL & SURGICAL HISTORY:  Lumbar radiculopathy: 2017  PCOS (polycystic ovarian syndrome)  Asthma  No significant past surgical history    Current Nutrition Order: regular diet     PO Intake: Good (%) [ X  ]  Fair (50-75%) [   ] Poor (<25%) [   ]    GI Issues: denies N/V, last BM this AM (pt states the lactulose syrup is helpful for bowel regularity)    Pain: pt states pain comes and goes    Skin Integrity: Aidan score 20    Labs:   08-27    139  |  99  |  17  ----------------------------<  82  4.4   |  31  |  0.67    Ca    8.9      27 Aug 2020 07:08  Phos  4.0     08-27  Mg     2.2     08-27    TPro  6.5  /  Alb  3.8  /  TBili  0.5  /  DBili  x   /  AST  15  /  ALT  22  /  AlkPhos  60  08-27    Medications:  MEDICATIONS  (STANDING):  bisacodyl 5 milliGRAM(s) Oral at bedtime  BUpivacaine liposome 1.3% Injectable (no eMAR) 20 milliLiter(s) Local Injection once  enoxaparin Injectable 40 milliGRAM(s) SubCutaneous at bedtime  gabapentin 300 milliGRAM(s) Oral three times a day  multivitamin 1 Tablet(s) Oral daily  senna 2 Tablet(s) Oral at bedtime    MEDICATIONS  (PRN):  acetaminophen   Tablet .. 650 milliGRAM(s) Oral every 6 hours PRN Temp greater or equal to 38C (100.4F), Mild Pain (1 - 3)  ALBUTerol    90 MICROgram(s) HFA Inhaler 2 Puff(s) Inhalation every 6 hours PRN Shortness of Breath and/or Wheezing  albuterol/ipratropium for Nebulization. 3 milliLiter(s) Nebulizer every 6 hours PRN Shortness of Breath and/or Wheezing  diazepam    Tablet 5 milliGRAM(s) Oral every 8 hours PRN muscle spasm  HYDROmorphone  Injectable 0.5 milliGRAM(s) IV Push every 3 hours PRN breakthrough pain  lactulose Syrup 10 Gram(s) Oral daily PRN constipation  ondansetron Injectable 4 milliGRAM(s) IV Push every 6 hours PRN Nausea  oxycodone    5 mG/acetaminophen 325 mG 1 Tablet(s) Oral every 4 hours PRN Moderate Pain (4 - 6)  oxycodone    5 mG/acetaminophen 325 mG 2 Tablet(s) Oral every 4 hours PRN Severe Pain (7 - 10)    Weight:  86.2kg (8/14)  86.2kg (8/19)    Weight Change: no changes in weight. Please obtain current weight and trend weekly weights for further assessment.    Admitted Anthropometrics:  Ht (8/14): 165.1cm, Wt (8/14): 86.2kg, IBW: 56.8kg+/-10%, %IBW: 151%, BMI: 31.6     Nutrition Focused Physical Exam: Completed [ X on 8/18  ]  Unable to complete [   ]  No significant findings on NFPE    Estimated energy needs:   IBW (56.8kg) used to calculate energy needs due to pt's current body weight exceeding 120% of IBW.  Needs adjusted for pre-op, overweight status).     7684-4166 kcal (25-30 kcal/kg IBW)  68-80gm protein (1.2-1.4gm protein/kg IBW)  1420-1704mL (25-30 mL/kg IBW)    Subjective: 26 y/o female with PMHX PCOS, asthma, p/w worsening LBP, a/w RLE weakness, numbness, noted to have L5-S1 HNP. Now s/p L5-S1 microdisectomy in 8/19. Per chart, pt complaining of left sided numbness and weakness in lower extremity and right sided weakness in lower extremity as well. Pt resting in bed on assessment. Pt states her appetite is good. Of note, pt on decadron, A1c 5.8% (8/23), BG levels controlled at this time, elevated A1c likely steroid induced. Pt awaiting acceptance to acute rehab per AM rounds. Authorization denies for acute rehab, plan for peer-peer now. Please see below for full nutritional recommendations. RD to monitor and f/u per protocol.    Nutrition Diagnosis:  Increased nutrient need increased protein demand AEB pre-op-N/A    PES: Increased nutrient need increased protein demand AEB post-op    Goal: Pt to meet >/=75%EER PO with good tolerance    Recommendations:  1. Recommend continue regular diet as medically appropriate  >>monitor need for CSTCHO dieatry restriction based on BG levels  2. Monitor labs (BMP, lytes); replete lytes prn  3. Obtain current weight and trend weekly weights    Education: Encouraged continued adequate PO intake with emphasis on lean protein- pt appeared receptive    Risk Level: High [   ] Moderate [ X ]  Low [   ]

## 2020-08-27 NOTE — PROGRESS NOTE ADULT - SUBJECTIVE AND OBJECTIVE BOX
HPI:  26 y/o female pmhx PCOS, asthma, and lumbar HNP 2017 treated w/ conservative managemetn presents to [facility] ED today after being discharged early from OSH with continued LBP radiating to the RLE and now to the LLE. Patient was admitted and worked up for cauda equina at [facility] and treated for L5S1 HNP with LICHA, steroids, gabapentin and percocet. She was discharged home today but is still unable to walk without assistance. She was scheduled to start outpatient PT but the pain and weakness is worsening. She also reports numbness to the RLE down to the foot now with tingling sensation in the LLE. Denies any recent trauma, falls, denies any recent illness, fever or chills. Denies saddle anesthesia, denies b/b dysfunction.    Hospital Course:  8/15: Patient was admitted and worked up for cauda equina at [facility] and treated for L5-S1 HNP with LICHA, steroids, gabapentin and percocet, transferred here for further workup  8/16: LAZARO overnight. Neuro exam stable.   8/17 LAZARO overnight, Neuro exam stable  8/18: LAZARO overnight. Neuro exam stable. Plan for OR tomorrow   8/19: POD# 0 S/P L5-S1 microdiscectomy. Pt seen and examined at bedside postop. States mild incision site pain. Denies worsening weakness/loss of sensation of extremities.  8/20: LAZARO overnight, neuro stable. Patient c/o LLE weakness and numbness post op overnight   8/21: LAZARO overnight, continued new LLE weakness  8/22: LAZARO overnight. Neuro consulted requested per Dr. Denise for lower extremity weakness/"coldness"  8/23: LAZARO. Overnight. Neurology consulted, labs requested are ordered.   8/24. No events overnight. F/u morning labs for free T4 per Neurology, f/u EMG per Neurology   8/25: POD#6 LAZARO o/n, neuro exam stable.  F/u Neurology recs. PT/OT rec AR, pend placement.   8/26: POD#7 Lazaro o/n, neuro exam stable. Neurology and psych rec outpt follow-up. PT/OT recs AR, [facility]. EMG to be done as outpt.   8/27: LAZARO overnight, neuro stable. pending rehab    Vital Signs Last 24 Hrs  T(C): 36.7 (26 Aug 2020 20:18), Max: 36.9 (26 Aug 2020 08:02)  T(F): 98.1 (26 Aug 2020 20:18), Max: 98.4 (26 Aug 2020 08:02)  HR: 85 (26 Aug 2020 20:18) (52 - 89)  BP: 110/72 (26 Aug 2020 20:18) (88/55 - 139/84)  BP(mean): --  RR: 17 (26 Aug 2020 20:18) (15 - 17)  SpO2: 95% (26 Aug 2020 20:18) (95% - 98%)    I&O's Summary    25 Aug 2020 07:01  -  26 Aug 2020 07:00  --------------------------------------------------------  IN: 480 mL / OUT: 800 mL / NET: -320 mL        PHYSICAL EXAM:  Gen: NAD  HEENT: PERRL. EOMI b/l  Neck: Supple   Lungs: CTAB  Heart: S1, S2. RRR  Abd: Soft, NT ND  Exts: 2+ pulses throughout  Neuro: AAO x 3.  full 5/5 strength uppers. LLE HF 3-4/5, knee flex 3-4/5, DF/PF 4/5. RLE 3-4/5, knee flex 3-4/5, DF/PF 4/5    TUBES/LINES:  [] Hendrix  [] Lumbar Drain  [] Wound Drains  [] Others      DIET:  [] NPO  [x] Mechanical  [] Tube feeds    LABS:                        13.1   15.11 )-----------( 223      ( 26 Aug 2020 06:34 )             41.0     08-26    137  |  100  |  19  ----------------------------<  90  4.5   |  28  |  0.59    Ca    8.8      26 Aug 2020 06:34              CAPILLARY BLOOD GLUCOSE          Drug Levels: [] N/A    CSF Analysis: [] N/A      Allergies    No Known Allergies    Intolerances      MEDICATIONS:  Antibiotics:    Neuro:  acetaminophen   Tablet .. 650 milliGRAM(s) Oral every 6 hours PRN  diazepam    Tablet 5 milliGRAM(s) Oral every 8 hours PRN  gabapentin 300 milliGRAM(s) Oral three times a day  HYDROmorphone  Injectable 0.5 milliGRAM(s) IV Push every 3 hours PRN  ondansetron Injectable 4 milliGRAM(s) IV Push every 6 hours PRN  oxycodone    5 mG/acetaminophen 325 mG 1 Tablet(s) Oral every 4 hours PRN  oxycodone    5 mG/acetaminophen 325 mG 2 Tablet(s) Oral every 4 hours PRN    Anticoagulation:  enoxaparin Injectable 40 milliGRAM(s) SubCutaneous at bedtime    OTHER:  ALBUTerol    90 MICROgram(s) HFA Inhaler 2 Puff(s) Inhalation every 6 hours PRN  albuterol/ipratropium for Nebulization. 3 milliLiter(s) Nebulizer every 6 hours PRN  bisacodyl 5 milliGRAM(s) Oral at bedtime  BUpivacaine liposome 1.3% Injectable (no eMAR) 20 milliLiter(s) Local Injection once  lactulose Syrup 10 Gram(s) Oral daily PRN  senna 2 Tablet(s) Oral at bedtime    IVF:  multivitamin 1 Tablet(s) Oral daily    CULTURES:    RADIOLOGY & ADDITIONAL TESTS:      ASSESSMENT:  26 y/o female with PMHX PCOS, asthma, p/w worsening LBP, a/w RLE weakness, numbness, has L5-S1 HNP, now s/p L5-S1 microdiscectomy (8/19).     PLAN:  - neuro checks  - vitals checks  - pain control  - Decadron taper  - Neurology following, recs appreciated: EMG study can be done outpt if pt's LLE weakness persists after rehab per Neurology, pt will need outpt f/u appt w/ neurology   - Psych following, recs appreciated: consider ordering SSRI for anxiety  - regular diet  - bowel regimen  - PT/OT/OOB  - DVT PROPHYLAXIS: [x] Venodynes [x] Heparin/Lovenox    DISPOSITION: full code, dispo pending    d/w Dr. Denise    Assessment:  Present when checked    []  GCS  E   V  M     Heart Failure: []Acute, [] acute on chronic , []chronic  Heart Failure:  [] Diastolic (HFpEF), [] Systolic (HFrEF), []Combined (HFpEF and HFrEF), [] RHF, [] Pulm HTN, [] Other    [] ANDREA, [] ATN, [] AIN, [] other  [] CKD1, [] CKD2, [] CKD 3, [] CKD 4, [] CKD 5, []ESRD    Encephalopathy: [] Metabolic, [] Hepatic, [] toxic, [] Neurological, [] Other    Abnormal Nurtitional Status: [] malnurtition (see nutrition note), [ ]underweight: BMI < 19, [] morbid obesity: BMI >40, [] Cachexia    [] Sepsis  [] hypovolemic shock,[] cardiogenic shock, [] hemorrhagic shock, [] neuogenic shock  [] Acute Respiratory Failure  []Cerebral edema, [] Brain compression/ herniation,   [] Functional quadriplegia  [] Acute blood loss anemia HPI:  24 y/o female pmhx PCOS, asthma, and lumbar HNP 2017 treated w/ conservative managemetn presents to [facility] ED today after being discharged early from OSH with continued LBP radiating to the RLE and now to the LLE. Patient was admitted and worked up for cauda equina at [facility] and treated for L5S1 HNP with LICHA, steroids, gabapentin and percocet. She was discharged home today but is still unable to walk without assistance. She was scheduled to start outpatient PT but the pain and weakness is worsening. She also reports numbness to the RLE down to the foot now with tingling sensation in the LLE. Denies any recent trauma, falls, denies any recent illness, fever or chills. Denies saddle anesthesia, denies b/b dysfunction.    Hospital Course:  8/15: Patient was admitted and worked up for cauda equina at [facility] and treated for L5-S1 HNP with LICHA, steroids, gabapentin and percocet, transferred here for further workup  8/16: LAZARO overnight. Neuro exam stable.   8/17 LAZARO overnight, Neuro exam stable  8/18: LAZARO overnight. Neuro exam stable. Plan for OR tomorrow   8/19: POD# 0 S/P L5-S1 microdiscectomy. Pt seen and examined at bedside postop. States mild incision site pain. Denies worsening weakness/loss of sensation of extremities.  8/20: LAZARO overnight, neuro stable. Patient c/o LLE weakness and numbness post op overnight   8/21: LAZARO overnight, continued new LLE weakness  8/22: LAZARO overnight. Neuro consulted requested per Dr. Denise for lower extremity weakness/"coldness"  8/23: LAZRAO. Overnight. Neurology consulted, labs requested are ordered.   8/24. No events overnight. F/u morning labs for free T4 per Neurology, f/u EMG per Neurology   8/25: POD#6 LAZARO o/n, neuro exam stable.  F/u Neurology recs. PT/OT rec AR, pend placement.   8/26: POD#7 Lazaro o/n, neuro exam stable. Neurology and psych rec outpt follow-up. PT/OT recs AR, pend insurance auth at [facility] facility. EMG to be done as outpt.   8/27: LAZARO overnight, neuro stable. Patient is participating in PT and eagerly awaiting rehab.  She still reports left leg weakness and is eager to improve.  Denies dizziness.  Denies new neurologic complaints.    Vital Signs Last 24 Hrs  T(C): 36.7 (26 Aug 2020 20:18), Max: 36.9 (26 Aug 2020 08:02)  T(F): 98.1 (26 Aug 2020 20:18), Max: 98.4 (26 Aug 2020 08:02)  HR: 85 (26 Aug 2020 20:18) (52 - 89)  BP: 110/72 (26 Aug 2020 20:18) (88/55 - 139/84)  BP(mean): --  RR: 17 (26 Aug 2020 20:18) (15 - 17)  SpO2: 95% (26 Aug 2020 20:18) (95% - 98%)    I&O's Summary    25 Aug 2020 07:01  -  26 Aug 2020 07:00  --------------------------------------------------------  IN: 480 mL / OUT: 800 mL / NET: -320 mL        PHYSICAL EXAM:  Gen: NAD  HEENT: PERRL. EOMI b/l  Neck: Supple   Lungs: CTAB  Heart: S1, S2. RRR  Abd: Soft, NT ND  Exts: 2+ pulses throughout  Neuro: AAO x 3.  full 5/5 strength uppers. LLE HF 3-4/5, knee flex 3-4/5, DF/PF 4/5. RLE 3-4/5, knee flex 3-4/5, DF/PF 4/5    TUBES/LINES:  [] Hendrix  [] Lumbar Drain  [] Wound Drains  [] Others      DIET:  [] NPO  [x] Mechanical  [] Tube feeds    LABS:                        13.1   15.11 )-----------( 223      ( 26 Aug 2020 06:34 )             41.0     08-26    137  |  100  |  19  ----------------------------<  90  4.5   |  28  |  0.59    Ca    8.8      26 Aug 2020 06:34              CAPILLARY BLOOD GLUCOSE          Drug Levels: [] N/A    CSF Analysis: [] N/A      Allergies    No Known Allergies    Intolerances      MEDICATIONS:  Antibiotics:    Neuro:  acetaminophen   Tablet .. 650 milliGRAM(s) Oral every 6 hours PRN  diazepam    Tablet 5 milliGRAM(s) Oral every 8 hours PRN  gabapentin 300 milliGRAM(s) Oral three times a day  HYDROmorphone  Injectable 0.5 milliGRAM(s) IV Push every 3 hours PRN  ondansetron Injectable 4 milliGRAM(s) IV Push every 6 hours PRN  oxycodone    5 mG/acetaminophen 325 mG 1 Tablet(s) Oral every 4 hours PRN  oxycodone    5 mG/acetaminophen 325 mG 2 Tablet(s) Oral every 4 hours PRN    Anticoagulation:  enoxaparin Injectable 40 milliGRAM(s) SubCutaneous at bedtime    OTHER:  ALBUTerol    90 MICROgram(s) HFA Inhaler 2 Puff(s) Inhalation every 6 hours PRN  albuterol/ipratropium for Nebulization. 3 milliLiter(s) Nebulizer every 6 hours PRN  bisacodyl 5 milliGRAM(s) Oral at bedtime  BUpivacaine liposome 1.3% Injectable (no eMAR) 20 milliLiter(s) Local Injection once  lactulose Syrup 10 Gram(s) Oral daily PRN  senna 2 Tablet(s) Oral at bedtime    IVF:  multivitamin 1 Tablet(s) Oral daily    CULTURES:    RADIOLOGY & ADDITIONAL TESTS:      ASSESSMENT:  24 y/o female with PMHX PCOS, asthma, p/w worsening LBP, a/w RLE weakness, numbness, has L5-S1 HNP, now s/p L5-S1 microdiscectomy (8/19).     PLAN:  - neuro checks  - vitals checks  - pain control  - Decadron taper  - Neurology following, recs appreciated: EMG study can be done outpt if pt's LLE weakness persists after rehab per Neurology, pt will need outpt f/u appt w/ neurology   - Psych following, recs appreciated: consider ordering SSRI for anxiety  - regular diet  - bowel regimen  - PT/OT/OOB  - Continue physical therapy, patient can participate in up to 4 hours of therapy a day at rehab and can medically participate in 15 hours of therapy a week   - DVT PROPHYLAXIS: [x] Venodynes [x] Heparin/Lovenox    DISPOSITION: full code, dispo pending    d/w Dr. Denise    Assessment:  Present when checked    []  GCS  E   V  M     Heart Failure: []Acute, [] acute on chronic , []chronic  Heart Failure:  [] Diastolic (HFpEF), [] Systolic (HFrEF), []Combined (HFpEF and HFrEF), [] RHF, [] Pulm HTN, [] Other    [] ANDREA, [] ATN, [] AIN, [] other  [] CKD1, [] CKD2, [] CKD 3, [] CKD 4, [] CKD 5, []ESRD    Encephalopathy: [] Metabolic, [] Hepatic, [] toxic, [] Neurological, [] Other    Abnormal Nurtitional Status: [] malnurtition (see nutrition note), [ ]underweight: BMI < 19, [] morbid obesity: BMI >40, [] Cachexia    [] Sepsis  [] hypovolemic shock,[] cardiogenic shock, [] hemorrhagic shock, [] neuogenic shock  [] Acute Respiratory Failure  []Cerebral edema, [] Brain compression/ herniation,   [] Functional quadriplegia  [] Acute blood loss anemia

## 2020-08-27 NOTE — PROGRESS NOTE ADULT - PROBLEM SELECTOR PROBLEM 1
Lumbar radiculopathy

## 2020-08-27 NOTE — PROGRESS NOTE ADULT - PROBLEM SELECTOR PROBLEM 3
Postoperative state
Preoperative clearance

## 2020-08-28 RX ORDER — METOCLOPRAMIDE HCL 10 MG
10 TABLET ORAL ONCE
Refills: 0 | Status: COMPLETED | OUTPATIENT
Start: 2020-08-28 | End: 2020-08-28

## 2020-08-28 RX ADMIN — Medication 5 MILLIGRAM(S): at 02:59

## 2020-08-28 RX ADMIN — HYDROMORPHONE HYDROCHLORIDE 0.5 MILLIGRAM(S): 2 INJECTION INTRAMUSCULAR; INTRAVENOUS; SUBCUTANEOUS at 06:08

## 2020-08-28 RX ADMIN — OXYCODONE AND ACETAMINOPHEN 2 TABLET(S): 5; 325 TABLET ORAL at 11:00

## 2020-08-28 RX ADMIN — ENOXAPARIN SODIUM 40 MILLIGRAM(S): 100 INJECTION SUBCUTANEOUS at 21:00

## 2020-08-28 RX ADMIN — OXYCODONE AND ACETAMINOPHEN 2 TABLET(S): 5; 325 TABLET ORAL at 18:57

## 2020-08-28 RX ADMIN — GABAPENTIN 300 MILLIGRAM(S): 400 CAPSULE ORAL at 21:00

## 2020-08-28 RX ADMIN — GABAPENTIN 300 MILLIGRAM(S): 400 CAPSULE ORAL at 05:01

## 2020-08-28 RX ADMIN — OXYCODONE AND ACETAMINOPHEN 2 TABLET(S): 5; 325 TABLET ORAL at 10:02

## 2020-08-28 RX ADMIN — OXYCODONE AND ACETAMINOPHEN 2 TABLET(S): 5; 325 TABLET ORAL at 19:57

## 2020-08-28 RX ADMIN — HYDROMORPHONE HYDROCHLORIDE 0.5 MILLIGRAM(S): 2 INJECTION INTRAMUSCULAR; INTRAVENOUS; SUBCUTANEOUS at 06:23

## 2020-08-28 RX ADMIN — OXYCODONE AND ACETAMINOPHEN 2 TABLET(S): 5; 325 TABLET ORAL at 00:30

## 2020-08-28 RX ADMIN — ONDANSETRON 4 MILLIGRAM(S): 8 TABLET, FILM COATED ORAL at 10:02

## 2020-08-28 RX ADMIN — GABAPENTIN 300 MILLIGRAM(S): 400 CAPSULE ORAL at 13:31

## 2020-08-28 RX ADMIN — Medication 1 TABLET(S): at 13:31

## 2020-08-28 RX ADMIN — Medication 5 MILLIGRAM(S): at 13:32

## 2020-08-28 RX ADMIN — OXYCODONE AND ACETAMINOPHEN 2 TABLET(S): 5; 325 TABLET ORAL at 15:18

## 2020-08-28 RX ADMIN — SENNA PLUS 2 TABLET(S): 8.6 TABLET ORAL at 21:00

## 2020-08-28 RX ADMIN — Medication 5 MILLIGRAM(S): at 21:00

## 2020-08-28 RX ADMIN — Medication 10 MILLIGRAM(S): at 22:42

## 2020-08-28 RX ADMIN — OXYCODONE AND ACETAMINOPHEN 2 TABLET(S): 5; 325 TABLET ORAL at 14:18

## 2020-08-28 RX ADMIN — ONDANSETRON 4 MILLIGRAM(S): 8 TABLET, FILM COATED ORAL at 21:00

## 2020-08-28 NOTE — PROGRESS NOTE ADULT - SUBJECTIVE AND OBJECTIVE BOX
Hospital Course:  8/15: Patient was admitted and worked up for cauda equina at [facility] and treated for L5-S1 HNP with LICHA, steroids, gabapentin and percocet, transferred here for further workup  8/16: LAZARO overnight. Neuro exam stable.   8/17 LAZARO overnight, Neuro exam stable  8/18: LAZARO overnight. Neuro exam stable. Plan for OR tomorrow   8/19: POD# 0 S/P L5-S1 microdiscectomy. Pt seen and examined at bedside postop. States mild incision site pain. Denies worsening weakness/loss of sensation of extremities.  8/20: LAZARO overnight, neuro stable. Patient c/o LLE weakness and numbness post op overnight   8/21: LAZARO overnight, continued new LLE weakness  8/22: LAZARO overnight. Neuro consulted requested per Dr. Denise for lower extremity weakness/"coldness"  8/23: LAZARO. Overnight. Neurology consulted, labs requested are ordered.   8/24. No events overnight. F/u morning labs for free T4 per Neurology, f/u EMG per Neurology   8/25: POD#6 LAZARO o/n, neuro exam stable.  F/u Neurology recs. PT/OT rec AR, pend placement.   8/26: POD#7 Lazaro o/n, neuro exam stable. Neurology and psych rec outpt follow-up. PT/OT recs AR, pend insurance auth at [facility] facility. EMG to be done as outpt.   8/27: LAZARO overnight, neuro stable. Patient is participating in PT and eagerly awaiting rehab.  She still reports left leg weakness and is eager to improve.  Denies dizziness.  Denies new neurologic complaints.  8/28: pending AR    OVERNIGHT EVENTS:  Vital Signs Last 24 Hrs  T(C): 36.7 (27 Aug 2020 19:59), Max: 37.1 (27 Aug 2020 08:56)  T(F): 98 (27 Aug 2020 19:59), Max: 98.7 (27 Aug 2020 08:56)  HR: 98 (27 Aug 2020 19:59) (77 - 120)  BP: 97/64 (27 Aug 2020 23:07) (93/55 - 109/76)  BP(mean): --  RR: 16 (27 Aug 2020 19:59) (16 - 17)  SpO2: 96% (27 Aug 2020 19:59) (94% - 99%)    I&O's Summary    27 Aug 2020 07:01  -  28 Aug 2020 00:37  --------------------------------------------------------  IN: 240 mL / OUT: 350 mL / NET: -110 mL        PHYSICAL EXAM:  Gen: NAD  HEENT: PERRL. EOMI b/l  Neck: Supple   Lungs: CTAB  Heart: S1, S2. RRR  Abd: Soft, NT ND  Exts: 2+ pulses throughout  Neuro: AAO x 3.  full 5/5 strength uppers. LLE HF 3-4/5, knee flex 3-4/5, DF/PF 4/5. RLE 3-4/5, knee flex 3-4/5, DF/PF 4/5    TUBES/LINES:  pivs      DIET:  [] NPO  [x] Mechanical  [] Tube feeds    LABS:                        13.5   16.17 )-----------( 355      ( 27 Aug 2020 07:08 )             42.5     08-27    139  |  99  |  17  ----------------------------<  82  4.4   |  31  |  0.67    Ca    8.9      27 Aug 2020 07:08  Phos  4.0     08-27  Mg     2.2     08-27    TPro  6.5  /  Alb  3.8  /  TBili  0.5  /  DBili  x   /  AST  15  /  ALT  22  /  AlkPhos  60  08-27        CARDIAC MARKERS ( 27 Aug 2020 07:08 )  x     / x     / 27 U/L / x     / x          CAPILLARY BLOOD GLUCOSE          Drug Levels: [] N/A    CSF Analysis: [] N/A      Allergies    No Known Allergies    Intolerances      MEDICATIONS:  Antibiotics:    Neuro:  acetaminophen   Tablet .. 650 milliGRAM(s) Oral every 6 hours PRN  diazepam    Tablet 5 milliGRAM(s) Oral every 8 hours PRN  gabapentin 300 milliGRAM(s) Oral three times a day  HYDROmorphone  Injectable 0.5 milliGRAM(s) IV Push every 3 hours PRN  ondansetron Injectable 4 milliGRAM(s) IV Push every 6 hours PRN  oxycodone    5 mG/acetaminophen 325 mG 1 Tablet(s) Oral every 4 hours PRN  oxycodone    5 mG/acetaminophen 325 mG 2 Tablet(s) Oral every 4 hours PRN    Anticoagulation:  enoxaparin Injectable 40 milliGRAM(s) SubCutaneous at bedtime    OTHER:  ALBUTerol    90 MICROgram(s) HFA Inhaler 2 Puff(s) Inhalation every 6 hours PRN  albuterol/ipratropium for Nebulization. 3 milliLiter(s) Nebulizer every 6 hours PRN  bisacodyl 5 milliGRAM(s) Oral at bedtime  BUpivacaine liposome 1.3% Injectable (no eMAR) 20 milliLiter(s) Local Injection once  lactulose Syrup 10 Gram(s) Oral daily PRN  senna 2 Tablet(s) Oral at bedtime    IVF:  multivitamin 1 Tablet(s) Oral daily    CULTURES:    RADIOLOGY & ADDITIONAL TESTS:      ASSESSMENT:  24 y/o female with PMHX PCOS, asthma, p/w worsening LBP, a/w RLE weakness, numbness, has L5-S1 HNP, now s/p L5-S1 microdiscectomy (8/19).     PLAN:  - neuro checks  - vitals checks  - pain control  - Decadron taper  - Neurology following, recs appreciated: EMG study can be done outpt if pt's LLE weakness persists after rehab per Neurology, pt will need outpt f/u appt w/ neurology   - Psych following, recs appreciated: consider ordering SSRI for anxiety  - regular diet  - bowel regimen  - PT/OT/OOB  - Continue physical therapy, patient can participate in up to 4 hours of therapy a day at rehab and can medically participate in 15 hours of therapy a week   - DVT PROPHYLAXIS: [x] Venodynes [x] Heparin/Lovenox    DISPOSITION: full code, dispo pending    d/w Dr. Denise

## 2020-08-29 LAB
ANION GAP SERPL CALC-SCNC: 9 MMOL/L — SIGNIFICANT CHANGE UP (ref 5–17)
BUN SERPL-MCNC: 19 MG/DL — SIGNIFICANT CHANGE UP (ref 7–23)
CALCIUM SERPL-MCNC: 9 MG/DL — SIGNIFICANT CHANGE UP (ref 8.4–10.5)
CHLORIDE SERPL-SCNC: 100 MMOL/L — SIGNIFICANT CHANGE UP (ref 96–108)
CO2 SERPL-SCNC: 31 MMOL/L — SIGNIFICANT CHANGE UP (ref 22–31)
CREAT SERPL-MCNC: 0.63 MG/DL — SIGNIFICANT CHANGE UP (ref 0.5–1.3)
GLUCOSE SERPL-MCNC: 93 MG/DL — SIGNIFICANT CHANGE UP (ref 70–99)
HCT VFR BLD CALC: 38 % — SIGNIFICANT CHANGE UP (ref 34.5–45)
HGB BLD-MCNC: 12 G/DL — SIGNIFICANT CHANGE UP (ref 11.5–15.5)
MCHC RBC-ENTMCNC: 27.9 PG — SIGNIFICANT CHANGE UP (ref 27–34)
MCHC RBC-ENTMCNC: 31.6 GM/DL — LOW (ref 32–36)
MCV RBC AUTO: 88.4 FL — SIGNIFICANT CHANGE UP (ref 80–100)
NRBC # BLD: 0 /100 WBCS — SIGNIFICANT CHANGE UP (ref 0–0)
PLATELET # BLD AUTO: 314 K/UL — SIGNIFICANT CHANGE UP (ref 150–400)
POTASSIUM SERPL-MCNC: 4.1 MMOL/L — SIGNIFICANT CHANGE UP (ref 3.5–5.3)
POTASSIUM SERPL-SCNC: 4.1 MMOL/L — SIGNIFICANT CHANGE UP (ref 3.5–5.3)
RBC # BLD: 4.3 M/UL — SIGNIFICANT CHANGE UP (ref 3.8–5.2)
RBC # FLD: 14.4 % — SIGNIFICANT CHANGE UP (ref 10.3–14.5)
SODIUM SERPL-SCNC: 140 MMOL/L — SIGNIFICANT CHANGE UP (ref 135–145)
WBC # BLD: 10.17 K/UL — SIGNIFICANT CHANGE UP (ref 3.8–10.5)
WBC # FLD AUTO: 10.17 K/UL — SIGNIFICANT CHANGE UP (ref 3.8–10.5)

## 2020-08-29 RX ADMIN — HYDROMORPHONE HYDROCHLORIDE 0.5 MILLIGRAM(S): 2 INJECTION INTRAMUSCULAR; INTRAVENOUS; SUBCUTANEOUS at 02:22

## 2020-08-29 RX ADMIN — Medication 5 MILLIGRAM(S): at 20:13

## 2020-08-29 RX ADMIN — OXYCODONE AND ACETAMINOPHEN 2 TABLET(S): 5; 325 TABLET ORAL at 10:13

## 2020-08-29 RX ADMIN — SENNA PLUS 2 TABLET(S): 8.6 TABLET ORAL at 20:13

## 2020-08-29 RX ADMIN — Medication 1 TABLET(S): at 13:44

## 2020-08-29 RX ADMIN — HYDROMORPHONE HYDROCHLORIDE 0.5 MILLIGRAM(S): 2 INJECTION INTRAMUSCULAR; INTRAVENOUS; SUBCUTANEOUS at 02:38

## 2020-08-29 RX ADMIN — OXYCODONE AND ACETAMINOPHEN 2 TABLET(S): 5; 325 TABLET ORAL at 14:44

## 2020-08-29 RX ADMIN — OXYCODONE AND ACETAMINOPHEN 2 TABLET(S): 5; 325 TABLET ORAL at 20:07

## 2020-08-29 RX ADMIN — Medication 5 MILLIGRAM(S): at 23:59

## 2020-08-29 RX ADMIN — GABAPENTIN 300 MILLIGRAM(S): 400 CAPSULE ORAL at 20:13

## 2020-08-29 RX ADMIN — Medication 5 MILLIGRAM(S): at 15:38

## 2020-08-29 RX ADMIN — OXYCODONE AND ACETAMINOPHEN 2 TABLET(S): 5; 325 TABLET ORAL at 13:44

## 2020-08-29 RX ADMIN — HYDROMORPHONE HYDROCHLORIDE 0.5 MILLIGRAM(S): 2 INJECTION INTRAMUSCULAR; INTRAVENOUS; SUBCUTANEOUS at 22:20

## 2020-08-29 RX ADMIN — HYDROMORPHONE HYDROCHLORIDE 0.5 MILLIGRAM(S): 2 INJECTION INTRAMUSCULAR; INTRAVENOUS; SUBCUTANEOUS at 05:58

## 2020-08-29 RX ADMIN — HYDROMORPHONE HYDROCHLORIDE 0.5 MILLIGRAM(S): 2 INJECTION INTRAMUSCULAR; INTRAVENOUS; SUBCUTANEOUS at 06:13

## 2020-08-29 RX ADMIN — HYDROMORPHONE HYDROCHLORIDE 0.5 MILLIGRAM(S): 2 INJECTION INTRAMUSCULAR; INTRAVENOUS; SUBCUTANEOUS at 22:35

## 2020-08-29 RX ADMIN — ONDANSETRON 4 MILLIGRAM(S): 8 TABLET, FILM COATED ORAL at 13:45

## 2020-08-29 RX ADMIN — OXYCODONE AND ACETAMINOPHEN 2 TABLET(S): 5; 325 TABLET ORAL at 19:07

## 2020-08-29 RX ADMIN — GABAPENTIN 300 MILLIGRAM(S): 400 CAPSULE ORAL at 05:50

## 2020-08-29 RX ADMIN — ENOXAPARIN SODIUM 40 MILLIGRAM(S): 100 INJECTION SUBCUTANEOUS at 20:13

## 2020-08-29 RX ADMIN — GABAPENTIN 300 MILLIGRAM(S): 400 CAPSULE ORAL at 13:44

## 2020-08-29 RX ADMIN — OXYCODONE AND ACETAMINOPHEN 2 TABLET(S): 5; 325 TABLET ORAL at 09:13

## 2020-08-29 RX ADMIN — ONDANSETRON 4 MILLIGRAM(S): 8 TABLET, FILM COATED ORAL at 20:13

## 2020-08-29 RX ADMIN — ONDANSETRON 4 MILLIGRAM(S): 8 TABLET, FILM COATED ORAL at 05:58

## 2020-08-29 NOTE — PROGRESS NOTE ADULT - SUBJECTIVE AND OBJECTIVE BOX
HPI:  26 y/o female pmhx PCOS, asthma, and lumbar HNP 2017 treated w/ conservative managemetn presents to [facility] ED today after being discharged early from OSH with continued LBP radiating to the RLE and now to the LLE. Patient was admitted and worked up for cauda equina at [facility] and treated for L5S1 HNP with LICHA, steroids, gabapentin and percocet. She was discharged home today but is still unable to walk without assistance. She was scheduled to start outpatient PT but the pain and weakness is worsening. She also reports numbness to the RLE down to the foot now with tingling sensation in the LLE. Denies any recent trauma, falls, denies any recent illness, fever or chills. Denies saddle anesthesia, denies b/b dysfunction.    Hospital Course:  8/15: Patient was admitted and worked up for cauda equina at [facility] and treated for L5-S1 HNP with LICHA, steroids, gabapentin and percocet, transferred here for further workup  8/16: LAZARO overnight. Neuro exam stable.   8/17 LAZARO overnight, Neuro exam stable  8/18: LAZARO overnight. Neuro exam stable. Plan for OR tomorrow   8/19: POD# 0 S/P L5-S1 microdiscectomy. Pt seen and examined at bedside postop. States mild incision site pain. Denies worsening weakness/loss of sensation of extremities.  8/20: LAZARO overnight, neuro stable. Patient c/o LLE weakness and numbness post op overnight   8/21: LAZARO overnight, continued new LLE weakness  8/22: LAZARO overnight. Neuro consulted requested per Dr. Denise for lower extremity weakness/"coldness"  8/23: LAZARO. Overnight. Neurology consulted, labs requested are ordered.   8/24. No events overnight. F/u morning labs for free T4 per Neurology, f/u EMG per Neurology   8/25: POD#6 LAZARO o/n, neuro exam stable.  F/u Neurology recs. PT/OT rec AR, pend placement.   8/26: POD#7 Lazaro o/n, neuro exam stable. Neurology and psych rec outpt follow-up. PT/OT recs AR, pend insurance auth at [facility] facility. EMG to be done as outpt.   8/27: LAZARO overnight, neuro stable. Patient is participating in PT and eagerly awaiting rehab.  She still reports left leg weakness and is eager to improve.  Denies dizziness.  Denies new neurologic complaints.  8/28: pending AR  8/29   OVERNIGHT EVENTS:  Vital Signs Last 24 Hrs  T(C): 36.7 (28 Aug 2020 20:00), Max: 36.8 (28 Aug 2020 15:39)  T(F): 98 (28 Aug 2020 20:00), Max: 98.3 (28 Aug 2020 15:39)  HR: 100 (28 Aug 2020 20:00) (76 - 100)  BP: 99/66 (28 Aug 2020 20:00) (93/51 - 105/67)  BP(mean): --  RR: 16 (28 Aug 2020 20:00) (15 - 16)  SpO2: 97% (28 Aug 2020 20:00) (95% - 98%)    I&O's Summary    27 Aug 2020 07:01  -  28 Aug 2020 07:00  --------------------------------------------------------  IN: 240 mL / OUT: 350 mL / NET: -110 mL        PHYSICAL EXAM:  Gen: NAD  HEENT: PERRL. EOMI b/l  Neck: Supple   Lungs: CTAB  Heart: S1, S2. RRR  Abd: Soft, NT ND  Exts: 2+ pulses throughout  Neuro: AAO x 3.  full 5/5 strength uppers. LLE HF 3-4/5, knee flex 3-4/5, DF/PF 4/5. RLE 3-4/5, knee flex 3-4/5, DF/PF 4/5      DIET: Regular      LABS:                        13.5   16.17 )-----------( 355      ( 27 Aug 2020 07:08 )             42.5     08-27    139  |  99  |  17  ----------------------------<  82  4.4   |  31  |  0.67    Ca    8.9      27 Aug 2020 07:08  Phos  4.0     08-27  Mg     2.2     08-27    TPro  6.5  /  Alb  3.8  /  TBili  0.5  /  DBili  x   /  AST  15  /  ALT  22  /  AlkPhos  60  08-27        CARDIAC MARKERS ( 27 Aug 2020 07:08 )  x     / x     / 27 U/L / x     / x          CAPILLARY BLOOD GLUCOSE          Drug Levels: [] N/A    CSF Analysis: [] N/A      Allergies    No Known Allergies    Intolerances      MEDICATIONS:  Antibiotics:    Neuro:  acetaminophen   Tablet .. 650 milliGRAM(s) Oral every 6 hours PRN  diazepam    Tablet 5 milliGRAM(s) Oral every 8 hours PRN  gabapentin 300 milliGRAM(s) Oral three times a day  HYDROmorphone  Injectable 0.5 milliGRAM(s) IV Push every 3 hours PRN  ondansetron Injectable 4 milliGRAM(s) IV Push every 6 hours PRN  oxycodone    5 mG/acetaminophen 325 mG 1 Tablet(s) Oral every 4 hours PRN  oxycodone    5 mG/acetaminophen 325 mG 2 Tablet(s) Oral every 4 hours PRN    Anticoagulation:  enoxaparin Injectable 40 milliGRAM(s) SubCutaneous at bedtime    OTHER:  ALBUTerol    90 MICROgram(s) HFA Inhaler 2 Puff(s) Inhalation every 6 hours PRN  albuterol/ipratropium for Nebulization. 3 milliLiter(s) Nebulizer every 6 hours PRN  bisacodyl 5 milliGRAM(s) Oral at bedtime  BUpivacaine liposome 1.3% Injectable (no eMAR) 20 milliLiter(s) Local Injection once  lactulose Syrup 10 Gram(s) Oral daily PRN  senna 2 Tablet(s) Oral at bedtime    IVF:  multivitamin 1 Tablet(s) Oral daily    CULTURES:    RADIOLOGY & ADDITIONAL TESTS:      ASSESSMENT:  25y Female  with PMHX PCOS, asthma, p/w worsening LBP, a/w RLE weakness, numbness, has L5-S1 HNP, now s/p L5-S1 microdiscectomy (8/19).       LUMBAR RADICULOPATHY  No pertinent family history in first degree relatives  Handoff  MEWS Score  Lumbar radiculopathy  PCOS (polycystic ovarian syndrome)  Asthma  Lumbar radiculopathy  PCOS (polycystic ovarian syndrome)  Asthma  Cauda equina syndrome  Lumbar herniated disc  Cauda equina syndrome  Lumbar herniated disc  Discectomy, spine, lumbar, 1 level, minimally invasive  Laminectomy, spine, lumbar, 1 level, with discectomy  Lumbar radiculopathy  Anxiety disorder, unspecified type  Other elevated white blood cell (WBC) count  Postoperative state  Preoperative clearance  Mild intermittent asthma without complication  Lumbar radiculopathy  Discectomy, spine, lumbar, 1 level, minimally invasive  No significant past surgical history  BACK PAIN  Lower extremity weakness  [email or web address]      PLAN:    - neuro checks  - vitals checks  - pain control  - Decadron taper  - Neurology following, recs appreciated: EMG study can be done outpt if pt's LLE weakness persists after rehab per Neurology, pt will need outpt f/u appt w/ neurology   - Psych following, recs appreciated: consider ordering SSRI for anxiety  - regular diet  - bowel regimen  - PT/OT/OOB  - Continue physical therapy, patient can participate in up to 4 hours of therapy a day at rehab and can medically participate in 15 hours of therapy a week   [place] [facility] [place]    DVT PROPHYLAXIS:  [x] Venodynes                                [x] Heparin/Lovenox    DISPOSITION: Full code. Pending AR    Assessment:  Present when checked    []  GCS  E   V  M     Heart Failure: []Acute, [] acute on chronic , []chronic  Heart Failure:  [] Diastolic (HFpEF), [] Systolic (HFrEF), []Combined (HFpEF and HFrEF), [] RHF, [] Pulm HTN, [] Other    [] ANDREA, [] ATN, [] AIN, [] other  [] CKD1, [] CKD2, [] CKD 3, [] CKD 4, [] CKD 5, []ESRD    Encephalopathy: [] Metabolic, [] Hepatic, [] toxic, [] Neurological, [] Other    Abnormal Nurtitional Status: [] malnurtition (see nutrition note), [ ]underweight: BMI < 19, [] morbid obesity: BMI >40, [] Cachexia    [] Sepsis  [] hypovolemic shock,[] cardiogenic shock, [] hemorrhagic shock, [] neuogenic shock  [] Acute Respiratory Failure  []Cerebral edema, [] Brain compression/ herniation,   [] Functional quadriplegia  [] Acute blood loss anemia

## 2020-08-30 LAB
ANION GAP SERPL CALC-SCNC: 10 MMOL/L — SIGNIFICANT CHANGE UP (ref 5–17)
BUN SERPL-MCNC: 18 MG/DL — SIGNIFICANT CHANGE UP (ref 7–23)
CALCIUM SERPL-MCNC: 9 MG/DL — SIGNIFICANT CHANGE UP (ref 8.4–10.5)
CHLORIDE SERPL-SCNC: 102 MMOL/L — SIGNIFICANT CHANGE UP (ref 96–108)
CO2 SERPL-SCNC: 31 MMOL/L — SIGNIFICANT CHANGE UP (ref 22–31)
CREAT SERPL-MCNC: 0.58 MG/DL — SIGNIFICANT CHANGE UP (ref 0.5–1.3)
GLUCOSE SERPL-MCNC: 91 MG/DL — SIGNIFICANT CHANGE UP (ref 70–99)
HCT VFR BLD CALC: 38.4 % — SIGNIFICANT CHANGE UP (ref 34.5–45)
HGB BLD-MCNC: 12.2 G/DL — SIGNIFICANT CHANGE UP (ref 11.5–15.5)
MAGNESIUM SERPL-MCNC: 2.3 MG/DL — SIGNIFICANT CHANGE UP (ref 1.6–2.6)
MCHC RBC-ENTMCNC: 28.2 PG — SIGNIFICANT CHANGE UP (ref 27–34)
MCHC RBC-ENTMCNC: 31.8 GM/DL — LOW (ref 32–36)
MCV RBC AUTO: 88.7 FL — SIGNIFICANT CHANGE UP (ref 80–100)
NRBC # BLD: 0 /100 WBCS — SIGNIFICANT CHANGE UP (ref 0–0)
PHOSPHATE SERPL-MCNC: 3.6 MG/DL — SIGNIFICANT CHANGE UP (ref 2.5–4.5)
PLATELET # BLD AUTO: 313 K/UL — SIGNIFICANT CHANGE UP (ref 150–400)
POTASSIUM SERPL-MCNC: 4.4 MMOL/L — SIGNIFICANT CHANGE UP (ref 3.5–5.3)
POTASSIUM SERPL-SCNC: 4.4 MMOL/L — SIGNIFICANT CHANGE UP (ref 3.5–5.3)
RBC # BLD: 4.33 M/UL — SIGNIFICANT CHANGE UP (ref 3.8–5.2)
RBC # FLD: 14.4 % — SIGNIFICANT CHANGE UP (ref 10.3–14.5)
SODIUM SERPL-SCNC: 143 MMOL/L — SIGNIFICANT CHANGE UP (ref 135–145)
WBC # BLD: 8.14 K/UL — SIGNIFICANT CHANGE UP (ref 3.8–10.5)
WBC # FLD AUTO: 8.14 K/UL — SIGNIFICANT CHANGE UP (ref 3.8–10.5)

## 2020-08-30 PROCEDURE — 99024 POSTOP FOLLOW-UP VISIT: CPT

## 2020-08-30 PROCEDURE — 99232 SBSQ HOSP IP/OBS MODERATE 35: CPT

## 2020-08-30 RX ORDER — PANTOPRAZOLE SODIUM 20 MG/1
40 TABLET, DELAYED RELEASE ORAL
Refills: 0 | Status: DISCONTINUED | OUTPATIENT
Start: 2020-08-30 | End: 2020-08-31

## 2020-08-30 RX ADMIN — GABAPENTIN 300 MILLIGRAM(S): 400 CAPSULE ORAL at 13:05

## 2020-08-30 RX ADMIN — HYDROMORPHONE HYDROCHLORIDE 0.5 MILLIGRAM(S): 2 INJECTION INTRAMUSCULAR; INTRAVENOUS; SUBCUTANEOUS at 05:51

## 2020-08-30 RX ADMIN — OXYCODONE AND ACETAMINOPHEN 2 TABLET(S): 5; 325 TABLET ORAL at 07:06

## 2020-08-30 RX ADMIN — OXYCODONE AND ACETAMINOPHEN 2 TABLET(S): 5; 325 TABLET ORAL at 18:47

## 2020-08-30 RX ADMIN — HYDROMORPHONE HYDROCHLORIDE 0.5 MILLIGRAM(S): 2 INJECTION INTRAMUSCULAR; INTRAVENOUS; SUBCUTANEOUS at 06:06

## 2020-08-30 RX ADMIN — Medication 5 MILLIGRAM(S): at 21:19

## 2020-08-30 RX ADMIN — Medication 1 TABLET(S): at 11:52

## 2020-08-30 RX ADMIN — OXYCODONE AND ACETAMINOPHEN 2 TABLET(S): 5; 325 TABLET ORAL at 23:14

## 2020-08-30 RX ADMIN — OXYCODONE AND ACETAMINOPHEN 2 TABLET(S): 5; 325 TABLET ORAL at 08:06

## 2020-08-30 RX ADMIN — HYDROMORPHONE HYDROCHLORIDE 0.5 MILLIGRAM(S): 2 INJECTION INTRAMUSCULAR; INTRAVENOUS; SUBCUTANEOUS at 16:04

## 2020-08-30 RX ADMIN — HYDROMORPHONE HYDROCHLORIDE 0.5 MILLIGRAM(S): 2 INJECTION INTRAMUSCULAR; INTRAVENOUS; SUBCUTANEOUS at 20:03

## 2020-08-30 RX ADMIN — PANTOPRAZOLE SODIUM 40 MILLIGRAM(S): 20 TABLET, DELAYED RELEASE ORAL at 12:13

## 2020-08-30 RX ADMIN — OXYCODONE AND ACETAMINOPHEN 2 TABLET(S): 5; 325 TABLET ORAL at 17:43

## 2020-08-30 RX ADMIN — SENNA PLUS 2 TABLET(S): 8.6 TABLET ORAL at 21:19

## 2020-08-30 RX ADMIN — GABAPENTIN 300 MILLIGRAM(S): 400 CAPSULE ORAL at 21:19

## 2020-08-30 RX ADMIN — OXYCODONE AND ACETAMINOPHEN 2 TABLET(S): 5; 325 TABLET ORAL at 14:05

## 2020-08-30 RX ADMIN — HYDROMORPHONE HYDROCHLORIDE 0.5 MILLIGRAM(S): 2 INJECTION INTRAMUSCULAR; INTRAVENOUS; SUBCUTANEOUS at 16:20

## 2020-08-30 RX ADMIN — OXYCODONE AND ACETAMINOPHEN 2 TABLET(S): 5; 325 TABLET ORAL at 13:05

## 2020-08-30 RX ADMIN — Medication 5 MILLIGRAM(S): at 11:52

## 2020-08-30 RX ADMIN — ENOXAPARIN SODIUM 40 MILLIGRAM(S): 100 INJECTION SUBCUTANEOUS at 21:19

## 2020-08-30 RX ADMIN — GABAPENTIN 300 MILLIGRAM(S): 400 CAPSULE ORAL at 05:09

## 2020-08-30 NOTE — PROGRESS NOTE ADULT - SUBJECTIVE AND OBJECTIVE BOX
Hospital Course:  8/15: Patient was admitted and worked up for cauda equina at [facility] and treated for L5-S1 HNP with LICHA, steroids, gabapentin and percocet, transferred here for further workup  8/16: LAZARO overnight. Neuro exam stable.   8/17 LAZARO overnight, Neuro exam stable  8/18: LAZARO overnight. Neuro exam stable. Plan for OR tomorrow   8/19: POD# 0 S/P L5-S1 microdiscectomy. Pt seen and examined at bedside postop. States mild incision site pain. Denies worsening weakness/loss of sensation of extremities.  8/20: LAZARO overnight, neuro stable. Patient c/o LLE weakness and numbness post op overnight   8/21: LAZARO overnight, continued new LLE weakness  8/22: LAZARO overnight. Neuro consulted requested per Dr. Denise for lower extremity weakness/"coldness"  8/23: LAZARO. Overnight. Neurology consulted, labs requested are ordered.   8/24. No events overnight. F/u morning labs for free T4 per Neurology, f/u EMG per Neurology   8/25: POD#6 LAZARO o/n, neuro exam stable.  F/u Neurology recs. PT/OT rec AR, pend placement.   8/26: POD#7 Lazaro o/n, neuro exam stable. Neurology and psych rec outpt follow-up. PT/OT recs AR, pend insurance auth at [facility] facility. EMG to be done as outpt.   8/27: LAZARO overnight, neuro stable. Patient is participating in PT and eagerly awaiting rehab.  She still reports left leg weakness and is eager to improve.  Denies dizziness.  Denies new neurologic complaints.  8/28: pending AR  8/29: LAZARO  8/30: LAZARO overnight. Neuro exam stable.    Vital Signs Last 24 Hrs  T(C): 36.8 (29 Aug 2020 19:43), Max: 36.8 (29 Aug 2020 05:57)  T(F): 98.2 (29 Aug 2020 19:43), Max: 98.3 (29 Aug 2020 05:57)  HR: 77 (29 Aug 2020 19:43) (77 - 105)  BP: 97/62 (29 Aug 2020 19:43) (97/62 - 109/65)  BP(mean): --  RR: 16 (29 Aug 2020 19:43) (16 - 18)  SpO2: 99% (29 Aug 2020 19:43) (96% - 99%)    I&O's Summary    PHYSICAL EXAM:  Gen: NAD  HEENT: PERRL. EOMI b/l  Neck: Supple   Lungs: CTAB  Heart: S1, S2. RRR  Abd: Soft, NT ND  Exts: 2+ pulses throughout  Neuro: AAO x 3.  full 5/5 strength uppers. LLE HF 3-4/5, knee flex 3-4/5, DF/PF 4/5. RLE 3-4/5, knee flex 3-4/5, DF/PF 4/5    TUBES/LINES:  [] Hendrix  [] Lumbar Drain  [] Wound Drains  [] Others    DIET:  [] NPO  [x] Mechanical  [] Tube feeds    LABS:                        12.0   10.17 )-----------( 314      ( 29 Aug 2020 07:07 )             38.0     08-29    140  |  100  |  19  ----------------------------<  93  4.1   |  31  |  0.63    Ca    9.0      29 Aug 2020 07:07              CAPILLARY BLOOD GLUCOSE          Drug Levels: [] N/A    CSF Analysis: [] N/A      Allergies    No Known Allergies    Intolerances      MEDICATIONS:  Antibiotics:    Neuro:  acetaminophen   Tablet .. 650 milliGRAM(s) Oral every 6 hours PRN  diazepam    Tablet 5 milliGRAM(s) Oral every 8 hours PRN  gabapentin 300 milliGRAM(s) Oral three times a day  HYDROmorphone  Injectable 0.5 milliGRAM(s) IV Push every 3 hours PRN  ondansetron Injectable 4 milliGRAM(s) IV Push every 6 hours PRN  oxycodone    5 mG/acetaminophen 325 mG 1 Tablet(s) Oral every 4 hours PRN  oxycodone    5 mG/acetaminophen 325 mG 2 Tablet(s) Oral every 4 hours PRN    Anticoagulation:  enoxaparin Injectable 40 milliGRAM(s) SubCutaneous at bedtime    OTHER:  ALBUTerol    90 MICROgram(s) HFA Inhaler 2 Puff(s) Inhalation every 6 hours PRN  albuterol/ipratropium for Nebulization. 3 milliLiter(s) Nebulizer every 6 hours PRN  bisacodyl 5 milliGRAM(s) Oral at bedtime  BUpivacaine liposome 1.3% Injectable (no eMAR) 20 milliLiter(s) Local Injection once  lactulose Syrup 10 Gram(s) Oral daily PRN  senna 2 Tablet(s) Oral at bedtime    IVF:  multivitamin 1 Tablet(s) Oral daily    CULTURES:    RADIOLOGY & ADDITIONAL TESTS:      ASSESSMENT:  24 y/o female with PMHX PCOS, asthma, p/w worsening LBP, a/w RLE weakness, numbness, has L5-S1 HNP, now s/p L5-S1 microdiscectomy (8/19).     LUMBAR RADICULOPATHY  No pertinent family history in first degree relatives  Handoff  MEWS Score  Lumbar radiculopathy  PCOS (polycystic ovarian syndrome)  Asthma  Lumbar radiculopathy  PCOS (polycystic ovarian syndrome)  Asthma  Cauda equina syndrome  Lumbar herniated disc  Cauda equina syndrome  Lumbar herniated disc  Discectomy, spine, lumbar, 1 level, minimally invasive  Laminectomy, spine, lumbar, 1 level, with discectomy  Lumbar radiculopathy  Anxiety disorder, unspecified type  Other elevated white blood cell (WBC) count  Postoperative state  Preoperative clearance  Mild intermittent asthma without complication  Lumbar radiculopathy  Discectomy, spine, lumbar, 1 level, minimally invasive  No significant past surgical history  BACK PAIN  Lower extremity weakness  [email or web address]    PLAN:  - neuro checks  - vitals checks  - pain control  - Decadron taper  - Neurology following, recs appreciated: EMG study can be done outpt if pt's LLE weakness persists after rehab per Neurology, pt will need outpt f/u appt w/ neurology   - Psych following, recs appreciated: consider ordering SSRI for anxiety  - regular diet  - bowel regimen  - PT/OT/OOB  - Continue physical therapy, patient can participate in up to 4 hours of therapy a day at rehab and can medically participate in 15 hours of therapy a week   - DVT PROPHYLAXIS: [x] Venodynes [x] Heparin/Lovenox    DISPOSITION: full code, dispo pending AR    d/w Dr. Denise

## 2020-08-31 ENCOUNTER — TRANSCRIPTION ENCOUNTER (OUTPATIENT)
Age: 25
End: 2020-08-31

## 2020-08-31 VITALS
DIASTOLIC BLOOD PRESSURE: 74 MMHG | RESPIRATION RATE: 16 BRPM | TEMPERATURE: 98 F | OXYGEN SATURATION: 99 % | HEART RATE: 75 BPM | SYSTOLIC BLOOD PRESSURE: 105 MMHG

## 2020-08-31 LAB
ANION GAP SERPL CALC-SCNC: 10 MMOL/L — SIGNIFICANT CHANGE UP (ref 5–17)
BUN SERPL-MCNC: 15 MG/DL — SIGNIFICANT CHANGE UP (ref 7–23)
CALCIUM SERPL-MCNC: 8.9 MG/DL — SIGNIFICANT CHANGE UP (ref 8.4–10.5)
CHLORIDE SERPL-SCNC: 101 MMOL/L — SIGNIFICANT CHANGE UP (ref 96–108)
CO2 SERPL-SCNC: 28 MMOL/L — SIGNIFICANT CHANGE UP (ref 22–31)
CREAT SERPL-MCNC: 0.53 MG/DL — SIGNIFICANT CHANGE UP (ref 0.5–1.3)
GLUCOSE SERPL-MCNC: 91 MG/DL — SIGNIFICANT CHANGE UP (ref 70–99)
HCT VFR BLD CALC: 36.4 % — SIGNIFICANT CHANGE UP (ref 34.5–45)
HGB BLD-MCNC: 11.5 G/DL — SIGNIFICANT CHANGE UP (ref 11.5–15.5)
MAGNESIUM SERPL-MCNC: 2.4 MG/DL — SIGNIFICANT CHANGE UP (ref 1.6–2.6)
MCHC RBC-ENTMCNC: 27.8 PG — SIGNIFICANT CHANGE UP (ref 27–34)
MCHC RBC-ENTMCNC: 31.6 GM/DL — LOW (ref 32–36)
MCV RBC AUTO: 88.1 FL — SIGNIFICANT CHANGE UP (ref 80–100)
NRBC # BLD: 0 /100 WBCS — SIGNIFICANT CHANGE UP (ref 0–0)
PHOSPHATE SERPL-MCNC: 3.6 MG/DL — SIGNIFICANT CHANGE UP (ref 2.5–4.5)
PLATELET # BLD AUTO: 289 K/UL — SIGNIFICANT CHANGE UP (ref 150–400)
POTASSIUM SERPL-MCNC: 4 MMOL/L — SIGNIFICANT CHANGE UP (ref 3.5–5.3)
POTASSIUM SERPL-SCNC: 4 MMOL/L — SIGNIFICANT CHANGE UP (ref 3.5–5.3)
RBC # BLD: 4.13 M/UL — SIGNIFICANT CHANGE UP (ref 3.8–5.2)
RBC # FLD: 14.6 % — HIGH (ref 10.3–14.5)
SODIUM SERPL-SCNC: 139 MMOL/L — SIGNIFICANT CHANGE UP (ref 135–145)
WBC # BLD: 7.43 K/UL — SIGNIFICANT CHANGE UP (ref 3.8–10.5)
WBC # FLD AUTO: 7.43 K/UL — SIGNIFICANT CHANGE UP (ref 3.8–10.5)

## 2020-08-31 PROCEDURE — 83036 HEMOGLOBIN GLYCOSYLATED A1C: CPT

## 2020-08-31 PROCEDURE — 85730 THROMBOPLASTIN TIME PARTIAL: CPT

## 2020-08-31 PROCEDURE — 97530 THERAPEUTIC ACTIVITIES: CPT

## 2020-08-31 PROCEDURE — 71045 X-RAY EXAM CHEST 1 VIEW: CPT

## 2020-08-31 PROCEDURE — 72158 MRI LUMBAR SPINE W/O & W/DYE: CPT

## 2020-08-31 PROCEDURE — 76000 FLUOROSCOPY <1 HR PHYS/QHP: CPT

## 2020-08-31 PROCEDURE — 86850 RBC ANTIBODY SCREEN: CPT

## 2020-08-31 PROCEDURE — 93970 EXTREMITY STUDY: CPT

## 2020-08-31 PROCEDURE — A9585: CPT

## 2020-08-31 PROCEDURE — 86225 DNA ANTIBODY NATIVE: CPT

## 2020-08-31 PROCEDURE — 72157 MRI CHEST SPINE W/O & W/DYE: CPT

## 2020-08-31 PROCEDURE — 83735 ASSAY OF MAGNESIUM: CPT

## 2020-08-31 PROCEDURE — 84443 ASSAY THYROID STIM HORMONE: CPT

## 2020-08-31 PROCEDURE — 97161 PT EVAL LOW COMPLEX 20 MIN: CPT

## 2020-08-31 PROCEDURE — 94640 AIRWAY INHALATION TREATMENT: CPT

## 2020-08-31 PROCEDURE — 97116 GAIT TRAINING THERAPY: CPT

## 2020-08-31 PROCEDURE — 85652 RBC SED RATE AUTOMATED: CPT

## 2020-08-31 PROCEDURE — 87635 SARS-COV-2 COVID-19 AMP PRB: CPT

## 2020-08-31 PROCEDURE — 97110 THERAPEUTIC EXERCISES: CPT

## 2020-08-31 PROCEDURE — 72156 MRI NECK SPINE W/O & W/DYE: CPT

## 2020-08-31 PROCEDURE — 86901 BLOOD TYPING SEROLOGIC RH(D): CPT

## 2020-08-31 PROCEDURE — 81025 URINE PREGNANCY TEST: CPT

## 2020-08-31 PROCEDURE — 70553 MRI BRAIN STEM W/O & W/DYE: CPT

## 2020-08-31 PROCEDURE — 72100 X-RAY EXAM L-S SPINE 2/3 VWS: CPT

## 2020-08-31 PROCEDURE — 82607 VITAMIN B-12: CPT

## 2020-08-31 PROCEDURE — 99024 POSTOP FOLLOW-UP VISIT: CPT

## 2020-08-31 PROCEDURE — 84436 ASSAY OF TOTAL THYROXINE: CPT

## 2020-08-31 PROCEDURE — 85027 COMPLETE CBC AUTOMATED: CPT

## 2020-08-31 PROCEDURE — 86038 ANTINUCLEAR ANTIBODIES: CPT

## 2020-08-31 PROCEDURE — 84100 ASSAY OF PHOSPHORUS: CPT

## 2020-08-31 PROCEDURE — 86769 SARS-COV-2 COVID-19 ANTIBODY: CPT

## 2020-08-31 PROCEDURE — 95940 IONM IN OPERATNG ROOM 15 MIN: CPT

## 2020-08-31 PROCEDURE — 80053 COMPREHEN METABOLIC PANEL: CPT

## 2020-08-31 PROCEDURE — 85610 PROTHROMBIN TIME: CPT

## 2020-08-31 PROCEDURE — 93005 ELECTROCARDIOGRAM TRACING: CPT

## 2020-08-31 PROCEDURE — 99285 EMERGENCY DEPT VISIT HI MDM: CPT

## 2020-08-31 PROCEDURE — 80048 BASIC METABOLIC PNL TOTAL CA: CPT

## 2020-08-31 PROCEDURE — 72131 CT LUMBAR SPINE W/O DYE: CPT

## 2020-08-31 PROCEDURE — 82550 ASSAY OF CK (CPK): CPT

## 2020-08-31 PROCEDURE — 97535 SELF CARE MNGMENT TRAINING: CPT

## 2020-08-31 PROCEDURE — 99231 SBSQ HOSP IP/OBS SF/LOW 25: CPT

## 2020-08-31 PROCEDURE — 85025 COMPLETE CBC W/AUTO DIFF WBC: CPT

## 2020-08-31 PROCEDURE — 36415 COLL VENOUS BLD VENIPUNCTURE: CPT

## 2020-08-31 PROCEDURE — 82746 ASSAY OF FOLIC ACID SERUM: CPT

## 2020-08-31 PROCEDURE — 86140 C-REACTIVE PROTEIN: CPT

## 2020-08-31 RX ORDER — GABAPENTIN 400 MG/1
1 CAPSULE ORAL
Qty: 0 | Refills: 0 | DISCHARGE
Start: 2020-08-31

## 2020-08-31 RX ORDER — IPRATROPIUM/ALBUTEROL SULFATE 18-103MCG
3 AEROSOL WITH ADAPTER (GRAM) INHALATION
Qty: 0 | Refills: 0 | DISCHARGE
Start: 2020-08-31

## 2020-08-31 RX ORDER — ALBUTEROL 90 UG/1
2 AEROSOL, METERED ORAL
Qty: 0 | Refills: 0 | DISCHARGE
Start: 2020-08-31

## 2020-08-31 RX ORDER — SENNA PLUS 8.6 MG/1
2 TABLET ORAL
Qty: 0 | Refills: 0 | DISCHARGE
Start: 2020-08-31

## 2020-08-31 RX ORDER — ENOXAPARIN SODIUM 100 MG/ML
40 INJECTION SUBCUTANEOUS
Qty: 0 | Refills: 0 | DISCHARGE
Start: 2020-08-31

## 2020-08-31 RX ORDER — DIAZEPAM 5 MG
1 TABLET ORAL
Qty: 0 | Refills: 0 | DISCHARGE
Start: 2020-08-31

## 2020-08-31 RX ADMIN — ONDANSETRON 4 MILLIGRAM(S): 8 TABLET, FILM COATED ORAL at 09:16

## 2020-08-31 RX ADMIN — OXYCODONE AND ACETAMINOPHEN 2 TABLET(S): 5; 325 TABLET ORAL at 08:12

## 2020-08-31 RX ADMIN — Medication 1 TABLET(S): at 11:00

## 2020-08-31 RX ADMIN — HYDROMORPHONE HYDROCHLORIDE 0.5 MILLIGRAM(S): 2 INJECTION INTRAMUSCULAR; INTRAVENOUS; SUBCUTANEOUS at 13:21

## 2020-08-31 RX ADMIN — HYDROMORPHONE HYDROCHLORIDE 0.5 MILLIGRAM(S): 2 INJECTION INTRAMUSCULAR; INTRAVENOUS; SUBCUTANEOUS at 00:55

## 2020-08-31 RX ADMIN — HYDROMORPHONE HYDROCHLORIDE 0.5 MILLIGRAM(S): 2 INJECTION INTRAMUSCULAR; INTRAVENOUS; SUBCUTANEOUS at 05:27

## 2020-08-31 RX ADMIN — OXYCODONE AND ACETAMINOPHEN 2 TABLET(S): 5; 325 TABLET ORAL at 11:00

## 2020-08-31 RX ADMIN — OXYCODONE AND ACETAMINOPHEN 2 TABLET(S): 5; 325 TABLET ORAL at 07:12

## 2020-08-31 RX ADMIN — PANTOPRAZOLE SODIUM 40 MILLIGRAM(S): 20 TABLET, DELAYED RELEASE ORAL at 07:04

## 2020-08-31 RX ADMIN — HYDROMORPHONE HYDROCHLORIDE 0.5 MILLIGRAM(S): 2 INJECTION INTRAMUSCULAR; INTRAVENOUS; SUBCUTANEOUS at 00:37

## 2020-08-31 RX ADMIN — GABAPENTIN 300 MILLIGRAM(S): 400 CAPSULE ORAL at 13:21

## 2020-08-31 RX ADMIN — Medication 5 MILLIGRAM(S): at 09:17

## 2020-08-31 RX ADMIN — HYDROMORPHONE HYDROCHLORIDE 0.5 MILLIGRAM(S): 2 INJECTION INTRAMUSCULAR; INTRAVENOUS; SUBCUTANEOUS at 13:40

## 2020-08-31 RX ADMIN — OXYCODONE AND ACETAMINOPHEN 2 TABLET(S): 5; 325 TABLET ORAL at 12:00

## 2020-08-31 RX ADMIN — HYDROMORPHONE HYDROCHLORIDE 0.5 MILLIGRAM(S): 2 INJECTION INTRAMUSCULAR; INTRAVENOUS; SUBCUTANEOUS at 05:12

## 2020-08-31 RX ADMIN — OXYCODONE AND ACETAMINOPHEN 2 TABLET(S): 5; 325 TABLET ORAL at 00:01

## 2020-08-31 RX ADMIN — GABAPENTIN 300 MILLIGRAM(S): 400 CAPSULE ORAL at 07:04

## 2020-08-31 RX ADMIN — OXYCODONE AND ACETAMINOPHEN 2 TABLET(S): 5; 325 TABLET ORAL at 15:28
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
## 2020-08-31 NOTE — PROGRESS NOTE BEHAVIORAL HEALTH - NSBHFUPINTERVALHXFT_PSY_A_CORE
Pt is for discharge to [facility] now. Reporting relief that she is headed to [facility] and says anxiety has been better.

## 2020-08-31 NOTE — PROGRESS NOTE BEHAVIORAL HEALTH - NSBHADMITCOUNSEL_PSY_A_CORE
risk factor reduction/diagnostic results/impressions and/or recommended studies/risks and benefits of treatment options/prognosis/client/family/caregiver education/instructions for management, treatment and follow up/importance of adherence to chosen treatment
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
## 2020-08-31 NOTE — PROGRESS NOTE ADULT - PROVIDER SPECIALTY LIST ADULT
Internal Medicine
Neurology
Neurology
Neurosurgery
Internal Medicine

## 2020-08-31 NOTE — DISCHARGE NOTE NURSING/CASE MANAGEMENT/SOCIAL WORK - PATIENT PORTAL LINK FT
You can access the FollowMyHealth Patient Portal offered by [facility] by registering at the following website: [email or web address]. By joining [facility]’s FollowMyHealth portal, you will also be able to view your health information using other applications (apps) compatible with our system.
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
## 2020-08-31 NOTE — PROGRESS NOTE ADULT - REASON FOR ADMISSION
worsening LBP pain/right leg weakness.

## 2020-08-31 NOTE — PROGRESS NOTE ADULT - SUBJECTIVE AND OBJECTIVE BOX
NP Note Neurosurgery    HPI:    OVERNIGHT EVENTS:  Vital Signs Last 24 Hrs  T(C): 36.7 (31 Aug 2020 08:01), Max: 37 (30 Aug 2020 19:56)  T(F): 98.1 (31 Aug 2020 08:01), Max: 98.6 (30 Aug 2020 19:56)  HR: 75 (31 Aug 2020 08:01) (75 - 104)  BP: 105/74 (31 Aug 2020 08:01) (101/63 - 105/74)  BP(mean): --  RR: 16 (31 Aug 2020 08:01) (16 - 18)  SpO2: 99% (31 Aug 2020 08:01) (96% - 99%)    I&O's Summary    30 Aug 2020 07:01  -  31 Aug 2020 07:00  --------------------------------------------------------  IN: 780 mL / OUT: 0 mL / NET: 780 mL    Hospital Course:  8/15: Patient was admitted and worked up for cauda equina at [facility] and treated for L5-S1 HNP with LICHA, steroids, gabapentin and percocet, transferred here for further workup  8/16: LAZARO overnight. Neuro exam stable.   8/17 LAZARO overnight, Neuro exam stable  8/18: LAZARO overnight. Neuro exam stable. Plan for OR tomorrow   8/19: POD# 0 S/P L5-S1 microdiscectomy. Pt seen and examined at bedside postop. States mild incision site pain. Denies worsening weakness/loss of sensation of extremities.  8/20: LAZARO overnight, neuro stable. Patient c/o LLE weakness and numbness post op overnight   8/21: LAZARO overnight, continued new LLE weakness  8/22: LAZARO overnight. Neuro consulted requested per Dr. Denise for lower extremity weakness/"coldness"  8/23: LAZARO. Overnight. Neurology consulted, labs requested are ordered.   8/24. No events overnight. F/u morning labs for free T4 per Neurology, f/u EMG per Neurology   8/25: POD#6 LAZARO o/n, neuro exam stable.  F/u Neurology recs. PT/OT rec AR, pend placement.   8/26: POD#7 Lazaro o/n, neuro exam stable. Neurology and psych rec outpt follow-up. PT/OT recs AR, [facility]. EMG to be done as outpt.   8/27: LAZARO overnight, neuro stable. Patient is participating in PT and eagerly awaiting rehab.  She still reports left leg weakness and is eager to improve.  Denies dizziness.  Denies new neurologic complaints.  8/28: pending AR  8/29 -8/31 Uneventful , pain is controlled      PHYSICAL EXAM:  Neurological:    Gen: NAD  HEENT: PERRL. EOMI b/l  Neck: Supple   Lungs: CTAB  Heart: S1, S2. RRR  Abd: Soft, NT ND  Exts: 2+ pulses throughout  Neuro: AAO x 3.  full 5/5 strength uppers. LLE HF 3-4/5, knee flex 3-4/5, DF/PF 4/5. RLE -4/5, knee flex -4/5, DF/PF 4/5                                       Cardiovascular: RRR  Respiratory: Lungs CTAB  Gastrointestinal: Abdomen round soft +BS  Genitourinary: voiding without difficulty  Extremities: warm and dry  Incision/Wound:  CDI no drainage noted    DIET: Regular  [] NPO    LABS:                        11.5   7.43  )-----------( 289      ( 31 Aug 2020 06:46 )             36.4     08-31    139  |  101  |  15  ----------------------------<  91  4.0   |  28  |  0.53    Ca    8.9      31 Aug 2020 06:46  Phos  3.6     08-31  Mg     2.4     08-31              CAPILLARY BLOOD GLUCOSE          Drug Levels: [] N/A    CSF Analysis: [] N/A      Allergies    No Known Allergies    Intolerances      MEDICATIONS:  Antibiotics:    Neuro:  acetaminophen   Tablet .. 650 milliGRAM(s) Oral every 6 hours PRN  diazepam    Tablet 5 milliGRAM(s) Oral every 8 hours PRN  gabapentin 300 milliGRAM(s) Oral three times a day  HYDROmorphone  Injectable 0.5 milliGRAM(s) IV Push every 3 hours PRN  ondansetron Injectable 4 milliGRAM(s) IV Push every 6 hours PRN  oxycodone    5 mG/acetaminophen 325 mG 1 Tablet(s) Oral every 4 hours PRN  oxycodone    5 mG/acetaminophen 325 mG 2 Tablet(s) Oral every 4 hours PRN    Anticoagulation:  enoxaparin Injectable 40 milliGRAM(s) SubCutaneous at bedtime    OTHER:  ALBUTerol    90 MICROgram(s) HFA Inhaler 2 Puff(s) Inhalation every 6 hours PRN  albuterol/ipratropium for Nebulization. 3 milliLiter(s) Nebulizer every 6 hours PRN  bisacodyl 5 milliGRAM(s) Oral at bedtime  BUpivacaine liposome 1.3% Injectable (no eMAR) 20 milliLiter(s) Local Injection once  lactulose Syrup 10 Gram(s) Oral daily PRN  pantoprazole    Tablet 40 milliGRAM(s) Oral before breakfast  senna 2 Tablet(s) Oral at bedtime    IVF:  multivitamin 1 Tablet(s) Oral daily    CULTURES:    RADIOLOGY & ADDITIONAL TESTS:      ASSESSMENT:  25y Female  with PMHX PCOS, asthma, p/w worsening LBP, a/w RLE weakness, numbness, has L5-S1 HNP, now s/p L5-S1 microdiscectomy (8/19).     PLAN:  NEURO:      Monitor neuro status  OT/PT  Pain Management  Bowel regime  Continue current medical regime    Dispo: Discussed pending rehab
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
## 2020-09-10 DIAGNOSIS — M54.16 RADICULOPATHY, LUMBAR REGION: ICD-10-CM

## 2020-09-10 DIAGNOSIS — M51.17 INTERVERTEBRAL DISC DISORDERS WITH RADICULOPATHY, LUMBOSACRAL REGION: ICD-10-CM

## 2020-09-10 DIAGNOSIS — R53.1 WEAKNESS: ICD-10-CM

## 2020-09-10 DIAGNOSIS — J45.909 UNSPECIFIED ASTHMA, UNCOMPLICATED: ICD-10-CM

## 2020-09-10 DIAGNOSIS — G83.4 CAUDA EQUINA SYNDROME: ICD-10-CM

## 2020-09-16 PROBLEM — M54.16 RADICULOPATHY, LUMBAR REGION: Chronic | Status: ACTIVE | Noted: 2020-08-14

## 2020-09-16 PROBLEM — J45.909 UNSPECIFIED ASTHMA, UNCOMPLICATED: Chronic | Status: ACTIVE | Noted: 2020-08-14

## 2020-09-16 PROBLEM — E28.2 POLYCYSTIC OVARIAN SYNDROME: Chronic | Status: ACTIVE | Noted: 2020-08-14

## 2020-09-17 NOTE — ED PROVIDER NOTE - NEURO NEGATIVE STATEMENT, MLM
Detail Level: Zone no loss of consciousness, no headache, +R foot drop, weakness and decreased sensation to RLE more than LLE

## 2020-09-21 PROBLEM — Z00.00 ENCOUNTER FOR PREVENTIVE HEALTH EXAMINATION: Status: ACTIVE | Noted: 2020-09-21

## 2020-09-21 RX ORDER — ALBUTEROL SULFATE 2.5 MG/3ML
(2.5 MG/3ML) SOLUTION RESPIRATORY (INHALATION)
Refills: 0 | Status: ACTIVE | COMMUNITY

## 2020-09-21 RX ORDER — IPRATROPIUM BROMIDE 0.5 MG/2.5ML
0.02 SOLUTION RESPIRATORY (INHALATION)
Refills: 0 | Status: ACTIVE | COMMUNITY

## 2020-09-21 RX ORDER — OXYCODONE HYDROCHLORIDE AND ACETAMINOPHEN 5; 325 MG/1; MG/1
5-325 TABLET ORAL
Refills: 0 | Status: ACTIVE | COMMUNITY

## 2020-09-21 RX ORDER — DIAZEPAM 5 MG/1
5 TABLET ORAL
Refills: 0 | Status: ACTIVE | COMMUNITY

## 2020-09-22 ENCOUNTER — APPOINTMENT (OUTPATIENT)
Dept: SPINE | Facility: CLINIC | Age: 25
End: 2020-09-22
Payer: COMMERCIAL

## 2020-09-22 VITALS
SYSTOLIC BLOOD PRESSURE: 96 MMHG | RESPIRATION RATE: 18 BRPM | HEART RATE: 73 BPM | BODY MASS INDEX: 32.49 KG/M2 | OXYGEN SATURATION: 98 % | HEIGHT: 65 IN | DIASTOLIC BLOOD PRESSURE: 62 MMHG | WEIGHT: 195 LBS | TEMPERATURE: 97.9 F

## 2020-09-22 PROCEDURE — 99024 POSTOP FOLLOW-UP VISIT: CPT

## 2020-09-22 NOTE — PHYSICAL EXAM
[General Appearance - Alert] : alert [General Appearance - In No Acute Distress] : in no acute distress [General Appearance - Well Nourished] : well nourished [General Appearance - Well-Appearing] : healthy appearing [Longitudinal] : longitudinal [Clean] : clean [Well-Healed] : well-healed [No Drainage] : without drainage [Normal Skin] : normal [Oriented To Time, Place, And Person] : oriented to person, place, and time [Impaired Insight] : insight and judgment were intact [Affect] : the affect was normal [Memory Recent] : recent memory was not impaired [Person] : oriented to person [Place] : oriented to place [Time] : oriented to time [Motor Tone] : muscle tone was normal in all four extremities [5] : L2 Iliopsoas 5/5 [4] : S1 toe walking 4/5 [Abnormal Walk] : normal gait [Limited Balance] : the patient's balance was impaired [No Visual Abnormalities] : no visible abnormailities [Normal] : normal [Erythema] : not erythematous [Tender] : not tender [Warm] : not warm [Indurated] : not indurated [Fluctuant] : not fluctuant [FreeTextEntry1] : lower back [Able to toe walk] : the patient was not able to toe walk [Able to heel walk] : the patient was not able to heel walk

## 2020-09-22 NOTE — ASSESSMENT
[FreeTextEntry1] : PLAN\par - continue PT/OT\par - refer to pain management for residual back/leg pain\par - RTC for new/worsening symptoms

## 2020-09-22 NOTE — REASON FOR VISIT
[Parent] : parent [de-identified] : L5-S1 Left sided microdiskectomy [de-identified] : 8/19/2020 [de-identified] : 5

## 2020-09-22 NOTE — HISTORY OF PRESENT ILLNESS
[FreeTextEntry1] : Patient is a 24 yo F with PMH of polycystic ovarian syndrome, asthma, HNP since 2017 who was initially presented to [facility] ED on 8/14/2020 for intractable back pain with LE radiculopathy. \par \par Hospital visit\par 24 y/o female pmhx PCOS, asthma, and lumbar HNP 2017 treated w/ conservative managemetn presents to [facility] ED today after being discharged early from OSH with continued LBP radiating to the RLE and now to the LLE. Patient was admitted and worked up for cauda equina at [facility] and treated for L5S1 HNP with LICHA, steroids, gabapentin and percocet. She was discharged home today but is still unable to walk without assistance. She was scheduled to start outpatient PT but the pain and weakness is worsening. She also reports numbness to the RLE down to the foot now with tingling sensation in the LLE. Denies any recent trauma, falls, denies any recent illness, fever or chills. Denies saddle anesthesia, denies b/b dysfunction.\par \par \par Hospital Course:\par 8/15: Patient was admitted and worked up for cauda equina at [facility] and treated for L5-S1 HNP with LICHA, steroids, gabapentin and percocet, transferred here for further workup\par 8/16: LAZARO overnight. Neuro exam stable. \par 8/17 LAZARO overnight, Neuro exam stable\par 8/18: LAZARO overnight. Neuro exam stable. Plan for OR tomorrow \par 8/19: POD# 0 S/P L5-S1 microdiscectomy. Pt seen and examined at bedside postop. States mild incision site pain. Denies worsening weakness/loss of sensation of extremities.\par 8/20: LAZARO overnight, neuro stable. Patient c/o LLE weakness and numbness post op overnight \par 8/21: LAZARO overnight, continued new LLE weakness\par 8/22: LAZARO overnight. Neuro consulted requested per Dr. Denise for lower extremity weakness/"coldness"\par 8/23: LAZARO. Overnight. Neurology consulted, labs requested are ordered. \par 8/24. No events overnight. F/u morning labs for free T4 per Neurology, f/u EMG per Neurology \par 8/25: POD#6 LAZARO o/n, neuro exam stable.  F/u Neurology recs. PT/OT rec AR, pend placement. \par 8/26: POD#7 Lazaro o/n, neuro exam stable. Neurology and psych rec outpt follow-up. PT/OT recs AR, pend insurance auth at [facility] facility. EMG to be done as outpt. \par 8/27: LAZARO overnight, neuro stable. Patient is participating in PT and eagerly awaiting rehab.  She still reports left leg weakness and is eager to improve.  Denies dizziness.  Denies new neurologic complaints.\par 8/28: pending AR\par 8/29 -8/31 Uneventful , pain is controlled\par \par [facility].\par Today she presents for post op follow up. As per patient, she was discharged to home one week ago form rehab and currently going for PT but unable to start OT d/t insurance coverage. She reports her back pain/LLE numbness and weakness has been slightly improving since the surgery and denies new/worsening symptoms. Currently ambulates with walker. Surgical incision appears to be healed well.

## 2020-09-22 NOTE — REVIEW OF SYSTEMS
[Arm Weakness] : arm weakness [Hand Weakness] :  hand weakness [Leg Weakness] : leg weakness [Numbness] : numbness [Difficulty Walking] : difficulty walking [As Noted in HPI] : as noted in HPI [Negative] : Heme/Lymph

## 2020-11-30 PROBLEM — Z87.42 HISTORY OF POLYCYSTIC OVARIAN SYNDROME: Status: RESOLVED | Noted: 2020-09-21 | Resolved: 2020-11-30

## 2020-11-30 PROBLEM — M51.26 LUMBAR HERNIATED DISC: Status: ACTIVE | Noted: 2020-09-21

## 2020-11-30 PROBLEM — Z78.9 CURRENT NON-SMOKER: Status: ACTIVE | Noted: 2020-09-22

## 2020-11-30 PROBLEM — J45.909 ASTHMA: Status: RESOLVED | Noted: 2020-09-21 | Resolved: 2020-11-30

## 2020-11-30 PROBLEM — Z98.890 S/P LUMBAR MICRODISCECTOMY: Status: ACTIVE | Noted: 2020-09-21

## 2020-12-03 ENCOUNTER — APPOINTMENT (OUTPATIENT)
Dept: SPINE | Facility: CLINIC | Age: 25
End: 2020-12-03
Payer: COMMERCIAL

## 2020-12-03 VITALS
RESPIRATION RATE: 18 BRPM | HEIGHT: 65 IN | HEART RATE: 89 BPM | DIASTOLIC BLOOD PRESSURE: 76 MMHG | BODY MASS INDEX: 32.49 KG/M2 | WEIGHT: 195 LBS | SYSTOLIC BLOOD PRESSURE: 113 MMHG | TEMPERATURE: 97.8 F | OXYGEN SATURATION: 98 %

## 2020-12-03 DIAGNOSIS — J45.909 UNSPECIFIED ASTHMA, UNCOMPLICATED: ICD-10-CM

## 2020-12-03 DIAGNOSIS — M51.26 OTHER INTERVERTEBRAL DISC DISPLACEMENT, LUMBAR REGION: ICD-10-CM

## 2020-12-03 DIAGNOSIS — Z78.9 OTHER SPECIFIED HEALTH STATUS: ICD-10-CM

## 2020-12-03 DIAGNOSIS — Z87.42 PERSONAL HISTORY OF OTHER DISEASES OF THE FEMALE GENITAL TRACT: ICD-10-CM

## 2020-12-03 DIAGNOSIS — Z98.890 OTHER SPECIFIED POSTPROCEDURAL STATES: ICD-10-CM

## 2020-12-03 PROCEDURE — 99072 ADDL SUPL MATRL&STAF TM PHE: CPT

## 2020-12-03 PROCEDURE — 99214 OFFICE O/P EST MOD 30 MIN: CPT

## 2020-12-03 NOTE — HISTORY OF PRESENT ILLNESS
[FreeTextEntry1] : Patient is a 26 yo F with PMH of polycystic ovarian syndrome, asthma, HNP since 2017 who was initially presented to [facility] ED on 8/14/2020 for intractable back pain with LE radiculopathy. \par \par Hospital visit\par 24 y/o female pmhx PCOS, asthma, and lumbar HNP 2017 treated w/ conservative managemetn presents to [facility] ED today after being discharged early from OSH with continued LBP radiating to the RLE and now to the LLE. Patient was admitted and worked up for cauda equina at [facility] and treated for L5S1 HNP with LICHA, steroids, gabapentin and percocet. She was discharged home today but is still unable to walk without assistance. She was scheduled to start outpatient PT but the pain and weakness is worsening. She also reports numbness to the RLE down to the foot now with tingling sensation in the LLE. Denies any recent trauma, falls, denies any recent illness, fever or chills. Denies saddle anesthesia, denies b/b dysfunction.\par \par \par Hospital Course:\par 8/15: Patient was admitted and worked up for cauda equina at [facility] and treated for L5-S1 HNP with LICHA, steroids, gabapentin and percocet, transferred here for further workup\par 8/16: LAZARO overnight. Neuro exam stable. \par 8/17 LAZARO overnight, Neuro exam stable\par 8/18: LAZARO overnight. Neuro exam stable. Plan for OR tomorrow \par 8/19: POD# 0 S/P L5-S1 microdiscectomy. Pt seen and examined at bedside postop. States mild incision site pain. Denies worsening weakness/loss of sensation of extremities.\par 8/20: LAZARO overnight, neuro stable. Patient c/o LLE weakness and numbness post op overnight \par 8/21: LAZARO overnight, continued new LLE weakness\par 8/22: LAZARO overnight. Neuro consulted requested per Dr. Denise for lower extremity weakness/"coldness"\par 8/23: LAZARO. Overnight. Neurology consulted, labs requested are ordered. \par 8/24. No events overnight. F/u morning labs for free T4 per Neurology, f/u EMG per Neurology \par 8/25: POD#6 LAZARO o/n, neuro exam stable.  F/u Neurology recs. PT/OT rec AR, pend placement. \par 8/26: POD#7 Lazaro o/n, neuro exam stable. Neurology and psych rec outpt follow-up. PT/OT recs AR, pend insurance auth at [facility] facility. EMG to be done as outpt. \par 8/27: LAZARO overnight, neuro stable. Patient is participating in PT and eagerly awaiting rehab.  She still reports left leg weakness and is eager to improve.  Denies dizziness.  Denies new neurologic complaints.\par 8/28: pending AR\par 8/29 -8/31 Uneventful , pain is controlled\par \par [facility].\par Today she presents for post op follow up. As per patient, she was discharged to home one week ago form rehab and currently going for PT but unable to start OT d/t insurance coverage. She reports her back pain/LLE numbness and weakness has been slightly improving since the surgery and denies new/worsening symptoms. Currently ambulates with walker. Surgical incision appears to be healed well.

## 2020-12-03 NOTE — REASON FOR VISIT
[Follow-Up: _____] : a [unfilled] follow-up visit [FreeTextEntry1] : s/p L5-S1 L microdiscectomy on 8/19/2020. Patient returns for 3 months post op follow up. She states her previous pain has been markedly improved and her L foot weakness has been improving with PT but continues to have numbness on LLE. She ambulates without assistive device.

## 2020-12-03 NOTE — END OF VISIT
[FreeTextEntry3] : It was great to see Aleida again.  She is improving nearly every week.  She is continuing PT.  She is wearing an AFO brace on her left foot.  We discussed the natural history of disc degeneration and discussed all the nonsurgical ways that she could potentially protect her cell from another episode in the future.  She is being worked up by another physician and recently had an EMG.  I told her she can follow-up me anytime she would like and I recommend that she continue work-up with her primary care doctor and her neurologist.  I answered all of her questions to the best my ability.\par \par Sanya Denise M.D., M.Sc.\par \par Department of Neurosurgery\par [facility] [employer] [facility]\par [facility]\par [facility]\par [place], [place]\par [email or web address]\par [phone] [>50% of the face to face encounter time was spent on counseling and/or coordination of care for ___] : Greater than 50% of the face to face encounter time was spent on counseling and/or coordination of care for [unfilled]

## 2020-12-03 NOTE — PHYSICAL EXAM
[General Appearance - Alert] : alert [General Appearance - In No Acute Distress] : in no acute distress [General Appearance - Well Nourished] : well nourished [General Appearance - Well-Appearing] : healthy appearing [Longitudinal] : longitudinal [Clean] : clean [Dry] : dry [Well-Healed] : well-healed [No Drainage] : without drainage [Normal Skin] : normal [Oriented To Time, Place, And Person] : oriented to person, place, and time [Impaired Insight] : insight and judgment were intact [Affect] : the affect was normal [Memory Recent] : recent memory was not impaired [4] : L4/5 extensor hallucis longus 4/5 [5] : S1 flexor hallucis longus 5/5 [Abnormal Walk] : normal gait [Limited Balance] : the patient's balance was impaired [FreeTextEntry1] : lower back

## 2021-02-02 ENCOUNTER — APPOINTMENT (OUTPATIENT)
Dept: SPINE | Facility: CLINIC | Age: 26
End: 2021-02-02
Payer: COMMERCIAL

## 2021-02-02 PROCEDURE — 99215 OFFICE O/P EST HI 40 MIN: CPT | Mod: 95

## 2021-02-03 NOTE — REASON FOR VISIT
[Follow-Up: _____] : a [unfilled] follow-up visit [FreeTextEntry1] : s/p L5-S1 L microdiscectomy on 8/19/2020. She reports sudden onset of worsening back to LE pain for ~ one week. She continues to go for PT with minimal relief.

## 2021-02-03 NOTE — END OF VISIT
[FreeTextEntry3] : It was great to talk to Aleida today on telehealth.  She has been doing well but had some new symptoms recently.  I would really like her to see a neurologist to perform an EMG as I am not convinced getting new imaging at this point would be beneficial.  I reviewed all of her past imaging with her and also discussed I decision making for surgery.  I answered all of her questions to the best my ability.  Once we can get those results we will follow up with her in the very near future.\par \par Sanya Denise M.D., M.Sc.\par \par Department of Neurosurgery\par [facility] at \[facility]\""\par [facility]\par [facility]\par [place], [place]\par [email or web address]\par [phone] [Time Spent: ___ minutes] : I have spent [unfilled] minutes of time on the encounter.

## 2021-02-03 NOTE — HISTORY OF PRESENT ILLNESS
[FreeTextEntry1] : Patient is a 26 yo F with PMH of polycystic ovarian syndrome, asthma, HNP since 2017 who was initially presented to [facility] ED on 8/14/2020 for intractable back pain with LE radiculopathy. \par \par Hospital visit\par 26 y/o female pmhx PCOS, asthma, and lumbar HNP 2017 treated w/ conservative managemetn presents to [facility] ED today after being discharged early from OSH with continued LBP radiating to the RLE and now to the LLE. Patient was admitted and worked up for cauda equina at [facility] and treated for L5S1 HNP with LICHA, steroids, gabapentin and percocet. She was discharged home today but is still unable to walk without assistance. She was scheduled to start outpatient PT but the pain and weakness is worsening. She also reports numbness to the RLE down to the foot now with tingling sensation in the LLE. Denies any recent trauma, falls, denies any recent illness, fever or chills. Denies saddle anesthesia, denies b/b dysfunction.\par \par \par Hospital Course:\par 8/15: Patient was admitted and worked up for cauda equina at [facility] and treated for L5-S1 HNP with LICHA, steroids, gabapentin and percocet, transferred here for further workup\par 8/16: LAZARO overnight. Neuro exam stable. \par 8/17 LAZARO overnight, Neuro exam stable\par 8/18: LAZARO overnight. Neuro exam stable. Plan for OR tomorrow \par 8/19: POD# 0 S/P L5-S1 microdiscectomy. Pt seen and examined at bedside postop. States mild incision site pain. Denies worsening weakness/loss of sensation of extremities.\par 8/20: LAZARO overnight, neuro stable. Patient c/o LLE weakness and numbness post op overnight \par 8/21: LAZARO overnight, continued new LLE weakness\par 8/22: LAZARO overnight. Neuro consulted requested per Dr. Denise for lower extremity weakness/"coldness"\par 8/23: LAZARO. Overnight. Neurology consulted, labs requested are ordered. \par 8/24. No events overnight. F/u morning labs for free T4 per Neurology, f/u EMG per Neurology \par 8/25: POD#6 LAZARO o/n, neuro exam stable.  F/u Neurology recs. PT/OT rec AR, pend placement. \par 8/26: POD#7 Lazaro o/n, neuro exam stable. Neurology and psych rec outpt follow-up. PT/OT recs AR, pend insurance auth at [facility] facility. EMG to be done as outpt. \par 8/27: LAZARO overnight, neuro stable. Patient is participating in PT and eagerly awaiting rehab.  She still reports left leg weakness and is eager to improve.  Denies dizziness.  Denies new neurologic complaints.\par 8/28: pending AR\par 8/29 -8/31 Uneventful , pain is controlled\par \par [facility].\par Today she presents for post op follow up. As per patient, she was discharged to home one week ago form rehab and currently going for PT but unable to start OT d/t insurance coverage. She reports her back pain/LLE numbness and weakness has been slightly improving since the surgery and denies new/worsening symptoms. Currently ambulates with walker. Surgical incision appears to be healed well.\par \par 3 months post op follow up  (12/3/2020)\par Patient returns for 3 months post op follow up. She states her previous pain has been markedly improved and her L foot weakness has been improving with PT but continues to have numbness on LLE. She ambulates without assistive device.

## 2021-02-03 NOTE — ASSESSMENT
[FreeTextEntry1] : PLAN\par - EMG LE (patient has schedule with neurology, she will send the report after)\par - continue PT for pain modality

## 2021-06-17 ENCOUNTER — APPOINTMENT (OUTPATIENT)
Dept: SPINE | Facility: CLINIC | Age: 26
End: 2021-06-17

## 2021-10-13 NOTE — BEHAVIORAL HEALTH ASSESSMENT NOTE - NSBHADMITCOORDWITH_PSY_A_CORE
Call your physician or seek medical care immediately if you notice any of the following symptoms of a bleed:   Red, dark, coffee or cola colored urine  Red or tar like stools  Excessive bleeding from gums or nose  Vomiting coffee colored or bright red material  Coughing up red tinged sputum  Severe or unprovoked pain (ex: severe Headache or Abdominal pain)  Sudden, spontaneous bruising for no reason  Excessive menstrual bleeding  A cut that will not stop bleeding within 10-15 mins    Avoid activities that may result in a serious fall or injury     
medical staff

## 2021-10-14 ENCOUNTER — APPOINTMENT (OUTPATIENT)
Dept: SPINE | Facility: CLINIC | Age: 26
End: 2021-10-14
Payer: COMMERCIAL

## 2021-10-14 VITALS
OXYGEN SATURATION: 98 % | WEIGHT: 210 LBS | SYSTOLIC BLOOD PRESSURE: 106 MMHG | HEART RATE: 72 BPM | HEIGHT: 65 IN | BODY MASS INDEX: 34.99 KG/M2 | TEMPERATURE: 97.5 F | DIASTOLIC BLOOD PRESSURE: 70 MMHG

## 2021-10-14 DIAGNOSIS — M54.50 LOW BACK PAIN, UNSPECIFIED: ICD-10-CM

## 2021-10-14 PROCEDURE — 99214 OFFICE O/P EST MOD 30 MIN: CPT

## 2021-10-14 NOTE — HISTORY OF PRESENT ILLNESS
[de-identified] : Aleida returns today.  She brings in an EMG report and neurologist note but has not had any new imaging since March she recently had an EMG which showed no lower extremity radiculopathy

## 2021-10-14 NOTE — DISCUSSION/SUMMARY
[de-identified] : I continue to be perplexed by Aleida her exam is out of proportion with her imaging and her complaints do not seem to match her electrodiagnostic testing I really have recommended that she seek care with a neurologist and I personally reviewed all of her past imaging looking for other neurologic causes of her complaints I explained to her that unfortunately I just do not think that more back surgery is going to be the answer for her especially in the setting of imaging and electrodiagnostic testing which does not support a structural or anatomical cause for her symptoms she verbalized understanding is going to get us the CD to review the pictures and we can discuss with her at a later date

## 2022-06-21 NOTE — PRE-OP CHECKLIST - BMI (KG/M2)
Exposed to covid (daughter Maldonado Lund child who had covid over the weekend).  Pt denies being symptomatic
31.6

## 2025-03-18 NOTE — CONSULT NOTE ADULT - NSTELEHEALTH_GEN_ALL_CORE
[Time Spent: ___ minutes] : I have spent [unfilled] minutes of time on the encounter which excludes teaching and separately reported services. [FreeTextEntry3] :  I, Jeff Shultz, am scribing for and in the presence of Dr. Rosa Maria Jara in the following sections: HISTORY OF PRESENT ILLNESS; REVIEW OF SYSTEMS; PHYSICAL EXAM; ASSESSMENT/ PLAN.I, Rosa Maria Jara, personally performed the services described in the documentation, reviewed the documentation recorded by the scribe in my presence, and it accurately and completely records my words and actions. 03/17/25 No

## 2025-05-28 ENCOUNTER — EMERGENCY (EMERGENCY)
Facility: HOSPITAL | Age: 30
LOS: 1 days | End: 2025-05-28
Admitting: STUDENT IN AN ORGANIZED HEALTH CARE EDUCATION/TRAINING PROGRAM
Payer: COMMERCIAL

## 2025-05-28 VITALS
OXYGEN SATURATION: 98 % | WEIGHT: 184.97 LBS | TEMPERATURE: 98 F | SYSTOLIC BLOOD PRESSURE: 123 MMHG | DIASTOLIC BLOOD PRESSURE: 84 MMHG | RESPIRATION RATE: 17 BRPM | HEART RATE: 90 BPM

## 2025-05-28 PROCEDURE — 99284 EMERGENCY DEPT VISIT MOD MDM: CPT | Mod: 25

## 2025-05-28 PROCEDURE — 72131 CT LUMBAR SPINE W/O DYE: CPT

## 2025-05-28 PROCEDURE — 72131 CT LUMBAR SPINE W/O DYE: CPT | Mod: 26

## 2025-05-28 PROCEDURE — 99284 EMERGENCY DEPT VISIT MOD MDM: CPT

## 2025-05-28 PROCEDURE — 96372 THER/PROPH/DIAG INJ SC/IM: CPT

## 2025-05-28 RX ORDER — CYCLOBENZAPRINE HYDROCHLORIDE 15 MG/1
1 CAPSULE, EXTENDED RELEASE ORAL
Qty: 20 | Refills: 0
Start: 2025-05-28 | End: 2025-06-06

## 2025-05-28 RX ORDER — DEXAMETHASONE 0.5 MG/1
6 TABLET ORAL ONCE
Refills: 0 | Status: COMPLETED | OUTPATIENT
Start: 2025-05-28 | End: 2025-05-28

## 2025-05-28 RX ORDER — IBUPROFEN 200 MG
1 TABLET ORAL
Qty: 30 | Refills: 0
Start: 2025-05-28 | End: 2025-06-06

## 2025-05-28 RX ORDER — KETOROLAC TROMETHAMINE 30 MG/ML
30 INJECTION, SOLUTION INTRAMUSCULAR; INTRAVENOUS ONCE
Refills: 0 | Status: DISCONTINUED | OUTPATIENT
Start: 2025-05-28 | End: 2025-05-28

## 2025-05-28 RX ORDER — DEXAMETHASONE 0.5 MG/1
1 TABLET ORAL
Qty: 21 | Refills: 0
Start: 2025-05-28 | End: 2025-06-02

## 2025-05-28 RX ADMIN — DEXAMETHASONE 6 MILLIGRAM(S): 0.5 TABLET ORAL at 14:58

## 2025-05-28 RX ADMIN — KETOROLAC TROMETHAMINE 30 MILLIGRAM(S): 30 INJECTION, SOLUTION INTRAMUSCULAR; INTRAVENOUS at 16:09

## 2025-05-28 RX ADMIN — KETOROLAC TROMETHAMINE 30 MILLIGRAM(S): 30 INJECTION, SOLUTION INTRAMUSCULAR; INTRAVENOUS at 14:58

## 2025-05-29 ENCOUNTER — NON-APPOINTMENT (OUTPATIENT)
Age: 30
End: 2025-05-29

## 2025-05-30 ENCOUNTER — APPOINTMENT (OUTPATIENT)
Dept: NEUROSURGERY | Facility: CLINIC | Age: 30
End: 2025-05-30
Payer: COMMERCIAL

## 2025-05-30 VITALS
SYSTOLIC BLOOD PRESSURE: 104 MMHG | TEMPERATURE: 97.5 F | BODY MASS INDEX: 30.53 KG/M2 | DIASTOLIC BLOOD PRESSURE: 68 MMHG | WEIGHT: 190 LBS | HEIGHT: 66 IN | HEART RATE: 76 BPM | OXYGEN SATURATION: 97 % | RESPIRATION RATE: 18 BRPM

## 2025-05-30 DIAGNOSIS — R20.2 PARESTHESIA OF SKIN: ICD-10-CM

## 2025-05-30 DIAGNOSIS — M54.50 LOW BACK PAIN, UNSPECIFIED: ICD-10-CM
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
## 2025-05-30 DIAGNOSIS — M51.26 OTHER INTERVERTEBRAL DISC DISPLACEMENT, LUMBAR REGION: ICD-10-CM

## 2025-05-30 PROCEDURE — 99202 OFFICE O/P NEW SF 15 MIN: CPT

## 2025-06-02 ENCOUNTER — TRANSCRIPTION ENCOUNTER (OUTPATIENT)
Age: 30
End: 2025-06-02

## 2025-06-02 ENCOUNTER — APPOINTMENT (OUTPATIENT)
Dept: MRI IMAGING | Age: 30
End: 2025-06-02
Payer: COMMERCIAL

## 2025-06-02 PROCEDURE — 72148 MRI LUMBAR SPINE W/O DYE: CPT

## 2025-06-19 ENCOUNTER — APPOINTMENT (OUTPATIENT)
Dept: SPINE | Facility: CLINIC | Age: 30
End: 2025-06-19